# Patient Record
Sex: MALE | Race: WHITE | Employment: OTHER | URBAN - METROPOLITAN AREA
[De-identification: names, ages, dates, MRNs, and addresses within clinical notes are randomized per-mention and may not be internally consistent; named-entity substitution may affect disease eponyms.]

---

## 2017-02-17 ENCOUNTER — ANESTHESIA (OUTPATIENT)
Dept: SURGERY | Facility: CLINIC | Age: 57
End: 2017-02-17
Payer: COMMERCIAL

## 2017-02-17 ENCOUNTER — SURGERY (OUTPATIENT)
Age: 57
End: 2017-02-17

## 2017-02-17 ENCOUNTER — ANESTHESIA EVENT (OUTPATIENT)
Dept: SURGERY | Facility: CLINIC | Age: 57
End: 2017-02-17
Payer: COMMERCIAL

## 2017-02-17 PROCEDURE — 25000125 ZZHC RX 250: Performed by: NURSE ANESTHETIST, CERTIFIED REGISTERED

## 2017-02-17 PROCEDURE — 25000128 H RX IP 250 OP 636: Performed by: NURSE ANESTHETIST, CERTIFIED REGISTERED

## 2017-02-17 PROCEDURE — 25800025 ZZH RX 258: Performed by: ANESTHESIOLOGY

## 2017-02-17 RX ORDER — ONDANSETRON 2 MG/ML
INJECTION INTRAMUSCULAR; INTRAVENOUS PRN
Status: DISCONTINUED | OUTPATIENT
Start: 2017-02-17 | End: 2017-02-17

## 2017-02-17 RX ORDER — EPHEDRINE SULFATE 50 MG/ML
INJECTION, SOLUTION INTRAMUSCULAR; INTRAVENOUS; SUBCUTANEOUS PRN
Status: DISCONTINUED | OUTPATIENT
Start: 2017-02-17 | End: 2017-02-17

## 2017-02-17 RX ORDER — FENTANYL CITRATE 50 UG/ML
INJECTION, SOLUTION INTRAMUSCULAR; INTRAVENOUS PRN
Status: DISCONTINUED | OUTPATIENT
Start: 2017-02-17 | End: 2017-02-17

## 2017-02-17 RX ORDER — GLYCOPYRROLATE 0.2 MG/ML
INJECTION, SOLUTION INTRAMUSCULAR; INTRAVENOUS PRN
Status: DISCONTINUED | OUTPATIENT
Start: 2017-02-17 | End: 2017-02-17

## 2017-02-17 RX ORDER — LIDOCAINE HYDROCHLORIDE 20 MG/ML
INJECTION, SOLUTION INFILTRATION; PERINEURAL PRN
Status: DISCONTINUED | OUTPATIENT
Start: 2017-02-17 | End: 2017-02-17

## 2017-02-17 RX ORDER — PROPOFOL 10 MG/ML
INJECTION, EMULSION INTRAVENOUS PRN
Status: DISCONTINUED | OUTPATIENT
Start: 2017-02-17 | End: 2017-02-17

## 2017-02-17 RX ORDER — NEOSTIGMINE METHYLSULFATE 1 MG/ML
VIAL (ML) INJECTION PRN
Status: DISCONTINUED | OUTPATIENT
Start: 2017-02-17 | End: 2017-02-17

## 2017-02-17 RX ADMIN — PHENYLEPHRINE HYDROCHLORIDE 50 MCG: 10 INJECTION, SOLUTION INTRAMUSCULAR; INTRAVENOUS; SUBCUTANEOUS at 14:24

## 2017-02-17 RX ADMIN — ROCURONIUM BROMIDE 30 MG: 10 INJECTION INTRAVENOUS at 13:20

## 2017-02-17 RX ADMIN — ONDANSETRON 4 MG: 2 INJECTION INTRAMUSCULAR; INTRAVENOUS at 14:25

## 2017-02-17 RX ADMIN — GLYCOPYRROLATE 0.6 MG: 0.2 INJECTION, SOLUTION INTRAMUSCULAR; INTRAVENOUS at 14:33

## 2017-02-17 RX ADMIN — PHENYLEPHRINE HYDROCHLORIDE 50 MCG: 10 INJECTION, SOLUTION INTRAMUSCULAR; INTRAVENOUS; SUBCUTANEOUS at 13:54

## 2017-02-17 RX ADMIN — PHENYLEPHRINE HYDROCHLORIDE 50 MCG: 10 INJECTION, SOLUTION INTRAMUSCULAR; INTRAVENOUS; SUBCUTANEOUS at 13:57

## 2017-02-17 RX ADMIN — METOPROLOL TARTRATE 2 MG: 5 INJECTION INTRAVENOUS at 14:38

## 2017-02-17 RX ADMIN — PHENYLEPHRINE HYDROCHLORIDE 100 MCG: 10 INJECTION, SOLUTION INTRAMUSCULAR; INTRAVENOUS; SUBCUTANEOUS at 13:22

## 2017-02-17 RX ADMIN — Medication 5 MG: at 13:42

## 2017-02-17 RX ADMIN — PHENYLEPHRINE HYDROCHLORIDE 50 MCG: 10 INJECTION, SOLUTION INTRAMUSCULAR; INTRAVENOUS; SUBCUTANEOUS at 14:00

## 2017-02-17 RX ADMIN — SODIUM CHLORIDE, POTASSIUM CHLORIDE, SODIUM LACTATE AND CALCIUM CHLORIDE: 600; 310; 30; 20 INJECTION, SOLUTION INTRAVENOUS at 13:59

## 2017-02-17 RX ADMIN — MIDAZOLAM HYDROCHLORIDE 1 MG: 1 INJECTION, SOLUTION INTRAMUSCULAR; INTRAVENOUS at 13:15

## 2017-02-17 RX ADMIN — Medication 5 MG: at 13:45

## 2017-02-17 RX ADMIN — PHENYLEPHRINE HYDROCHLORIDE 50 MCG: 10 INJECTION, SOLUTION INTRAMUSCULAR; INTRAVENOUS; SUBCUTANEOUS at 13:37

## 2017-02-17 RX ADMIN — PHENYLEPHRINE HYDROCHLORIDE 50 MCG: 10 INJECTION, SOLUTION INTRAMUSCULAR; INTRAVENOUS; SUBCUTANEOUS at 14:19

## 2017-02-17 RX ADMIN — PHENYLEPHRINE HYDROCHLORIDE 50 MCG: 10 INJECTION, SOLUTION INTRAMUSCULAR; INTRAVENOUS; SUBCUTANEOUS at 13:51

## 2017-02-17 RX ADMIN — PHENYLEPHRINE HYDROCHLORIDE 50 MCG: 10 INJECTION, SOLUTION INTRAMUSCULAR; INTRAVENOUS; SUBCUTANEOUS at 14:06

## 2017-02-17 RX ADMIN — PHENYLEPHRINE HYDROCHLORIDE 50 MCG: 10 INJECTION, SOLUTION INTRAMUSCULAR; INTRAVENOUS; SUBCUTANEOUS at 14:03

## 2017-02-17 RX ADMIN — Medication 5 MG: at 14:03

## 2017-02-17 RX ADMIN — MIDAZOLAM HYDROCHLORIDE 1 MG: 1 INJECTION, SOLUTION INTRAMUSCULAR; INTRAVENOUS at 13:17

## 2017-02-17 RX ADMIN — Medication 10 MG: at 13:29

## 2017-02-17 RX ADMIN — Medication 5 MG: at 13:51

## 2017-02-17 RX ADMIN — FENTANYL CITRATE 50 MCG: 50 INJECTION, SOLUTION INTRAMUSCULAR; INTRAVENOUS at 13:22

## 2017-02-17 RX ADMIN — FENTANYL CITRATE 50 MCG: 50 INJECTION, SOLUTION INTRAMUSCULAR; INTRAVENOUS at 13:20

## 2017-02-17 RX ADMIN — PROPOFOL 150 MG: 10 INJECTION, EMULSION INTRAVENOUS at 13:20

## 2017-02-17 RX ADMIN — PHENYLEPHRINE HYDROCHLORIDE 100 MCG: 10 INJECTION, SOLUTION INTRAMUSCULAR; INTRAVENOUS; SUBCUTANEOUS at 13:27

## 2017-02-17 RX ADMIN — Medication 5 MG: at 14:12

## 2017-02-17 RX ADMIN — PHENYLEPHRINE HYDROCHLORIDE 50 MCG: 10 INJECTION, SOLUTION INTRAMUSCULAR; INTRAVENOUS; SUBCUTANEOUS at 14:28

## 2017-02-17 RX ADMIN — PHENYLEPHRINE HYDROCHLORIDE 50 MCG: 10 INJECTION, SOLUTION INTRAMUSCULAR; INTRAVENOUS; SUBCUTANEOUS at 13:33

## 2017-02-17 RX ADMIN — PHENYLEPHRINE HYDROCHLORIDE 100 MCG: 10 INJECTION, SOLUTION INTRAMUSCULAR; INTRAVENOUS; SUBCUTANEOUS at 13:42

## 2017-02-17 RX ADMIN — PHENYLEPHRINE HYDROCHLORIDE 50 MCG: 10 INJECTION, SOLUTION INTRAMUSCULAR; INTRAVENOUS; SUBCUTANEOUS at 13:49

## 2017-02-17 RX ADMIN — Medication 5 MG: at 13:49

## 2017-02-17 RX ADMIN — NEOSTIGMINE METHYLSULFATE 4 MG: 1 INJECTION INTRAMUSCULAR; INTRAVENOUS; SUBCUTANEOUS at 14:33

## 2017-02-17 RX ADMIN — Medication 5 MG: at 13:33

## 2017-02-17 RX ADMIN — LIDOCAINE HYDROCHLORIDE 100 MG: 20 INJECTION, SOLUTION INFILTRATION; PERINEURAL at 13:20

## 2017-02-17 RX ADMIN — PHENYLEPHRINE HYDROCHLORIDE 50 MCG: 10 INJECTION, SOLUTION INTRAMUSCULAR; INTRAVENOUS; SUBCUTANEOUS at 14:09

## 2017-02-17 RX ADMIN — PHENYLEPHRINE HYDROCHLORIDE 50 MCG: 10 INJECTION, SOLUTION INTRAMUSCULAR; INTRAVENOUS; SUBCUTANEOUS at 14:12

## 2017-02-17 RX ADMIN — Medication 5 MG: at 13:37

## 2017-02-17 ASSESSMENT — ENCOUNTER SYMPTOMS
ORTHOPNEA: 0
SEIZURES: 0

## 2017-02-17 ASSESSMENT — LIFESTYLE VARIABLES: TOBACCO_USE: 0

## 2017-02-17 NOTE — ANESTHESIA CARE TRANSFER NOTE
Patient: Chris Barriga    Procedure(s):  INTRAOPERATIVE COLONOSCOPY, POLYPECTOMY - Wound Class: II-Clean Contaminated    Diagnosis: RIGHT COLON POLYP  Diagnosis Additional Information: No value filed.    Anesthesia Type:   General, ETT     Note:  Airway :Face Mask  Patient transferred to:PACU        Vitals: (Last set prior to Anesthesia Care Transfer)    CRNA VITALS  2/17/2017 1411 - 2/17/2017 1445      2/17/2017             Pulse: 77    SpO2: 100 %    Resp Rate (set): 10                Electronically Signed By: BETH Restrepo CRNA  February 17, 2017  2:45 PM

## 2017-02-17 NOTE — ANESTHESIA POSTPROCEDURE EVALUATION
Patient: Chris Barriga    Procedure(s):  INTRAOPERATIVE COLONOSCOPY, POLYPECTOMY - Wound Class: II-Clean Contaminated    Diagnosis:RIGHT COLON POLYP  Diagnosis Additional Information: No value filed.    Anesthesia Type:  General, ETT    Note:  Anesthesia Post Evaluation    Patient location during evaluation: PACU  Patient participation: Able to fully participate in evaluation  Level of consciousness: awake  Pain management: adequate  Airway patency: patent  Cardiovascular status: acceptable  Respiratory status: acceptable  Hydration status: acceptable  PONV: none     Anesthetic complications: None          Last vitals:  Vitals:    02/17/17 1450 02/17/17 1500 02/17/17 1526   BP: 115/69 132/83 145/86   Resp: 15  16   Temp:   36.7  C (98  F)   SpO2: 100%  98%         Electronically Signed By: Carter Dias MD  February 17, 2017  5:32 PM

## 2017-02-17 NOTE — ANESTHESIA PREPROCEDURE EVALUATION
"Procedure: Procedure(s):  COLONOSCOPY  LAPAROSCOPIC ASSISTED COLECTOMY  COLECTOMY RIGHT  Preop diagnosis: RIGHT COLON POLYP  Allergies   Allergen Reactions     Seasonal Allergies      Vicodin [Hydrocodone-Acetaminophen] Other (See Comments)     FEELS WIRED     Patient Active Problem List   Diagnosis     Pain in joint, lower leg     Other postprocedural status(V45.89)     Iliac artery aneurysm, left (H)     CAD (coronary artery disease)     Hypertension     Past Medical History   Diagnosis Date     Adenoma of ascending colon      Chest pain      Coronary artery disease      HTN (hypertension)      Hyperlipidaemia      Stented coronary artery      Past Surgical History   Procedure Laterality Date     Appendectomy open       Arthroscopy knee bilateral       Arthroscopy shoulder       left     C oral surgery procedure       Vasectomy       Cardiac surgery       stent x 1     Laparoscopic herniorrhaphy inguinal  12/30/2013     Procedure: LAPAROSCOPIC HERNIORRHAPHY INGUINAL;  LAPAROSCOPIC RIGHT INGUINAL HERNIA REPAIR WITH MESH;  Surgeon: Reji Pizano MD;  Location: [facility]     Abdomen surgery  appy and hernia       No current facility-administered medications on file prior to encounter.   Current Outpatient Prescriptions on File Prior to Encounter:  fish oil-omega-3 fatty acids (FISH OIL) 1000 MG capsule Take 1 g by mouth daily    glucosamine-chondroitin 500-400 MG CAPS Take 2 capsules by mouth daily    Lysine 500 MG TABS Take 1 tablet by mouth daily.   MINOCYCLINE HCL PO Take 100 mg by mouth daily as needed (acne)    Multiple Vitamin (MULTI-VITAMIN) per tablet Take 1 tablet by mouth daily.   nitroglycerin (NITROSTAT) 0.4 MG SL tablet Place 1 tablet under the tongue every 5 minutes as needed for chest pain for 3 doses. Administer every 5 minutes as needed.  Maximum 3 doses in 15 minutes.     /88  Temp 36.3  C (97.3  F) (Temporal)  Resp 16  Ht 1.803 m (5' 11\")  Wt 79.4 kg (175 lb)  SpO2 99%  BMI 24.41 " kg/m2    Lab Results   Component Value Date    WBC 4.8 05/25/2012     Lab Results   Component Value Date    RBC 4.97 05/25/2012     Lab Results   Component Value Date    HGB 16.9 02/17/2017     Lab Results   Component Value Date    HCT 44.2 05/25/2012     Lab Results   Component Value Date    MCV 89 05/25/2012     Lab Results   Component Value Date    MCH 32.6 05/25/2012     Lab Results   Component Value Date    MCHC 36.7 05/25/2012     Lab Results   Component Value Date    RDW 13.0 05/25/2012     Lab Results   Component Value Date     05/25/2012     Lab Results   Component Value Date    INR 0.89 05/25/2012       Last Basic Metabolic Panel:  Lab Results   Component Value Date     06/21/2012      Lab Results   Component Value Date    POTASSIUM 3.4 02/17/2017     Lab Results   Component Value Date    CHLORIDE 104 06/21/2012     Lab Results   Component Value Date    DANY 9.3 06/21/2012     Lab Results   Component Value Date    CO2 28 06/21/2012     Lab Results   Component Value Date    BUN 15 06/21/2012     Lab Results   Component Value Date    CR 1.04 02/17/2017     Lab Results   Component Value Date    GLC 95 06/21/2012       HGB 15.2  K 4.1  EKG - SR, nl axis    Anesthesia Evaluation     . Pt has had prior anesthetic.     No history of anesthetic complications     ROS/MED HX    ENT/Pulmonary:  - neg pulmonary ROS    (-) tobacco use, sleep apnea and recent URI   Neurologic:  - neg neurologic ROS    (-) seizures, CVA and migraines   Cardiovascular:     (+) Dyslipidemia, hypertension--CAD, --stent (Stent to LAD),may 2012  . : . . . :. .      (-) angina, past MI, CHF, orthopnea/PND, angina and past MI   METS/Exercise Tolerance:     Hematologic:  - neg hematologic  ROS       Musculoskeletal:         GI/Hepatic:     (+) Other GI/Hepatic colon polyp     (-) GERD   Renal/Genitourinary:  - ROS Renal section negative       Endo:  - neg endo ROS    (-) Type II DM and thyroid disease   Psychiatric:  - neg  psychiatric ROS       Infectious Disease:  - neg infectious disease ROS       Malignancy:   (+) Malignancy History of Skin          Other:               Physical Exam  Normal systems: cardiovascular, pulmonary and dental    Airway   Mallampati: I  TM distance: >3 FB  Neck ROM: full    Dental     Cardiovascular   Rhythm and rate: regular and normal      Pulmonary    breath sounds clear to auscultation                    Anesthesia Plan      History & Physical Review  History and physical reviewed and following examination; no interval change.    ASA Status:  3 .    NPO Status:  > 8 hours    Plan for General and ETT with Propofol induction. Maintenance will be Balanced.    PONV prophylaxis:  Ondansetron (or other 5HT-3)       Postoperative Care      Consents  Anesthetic plan, risks, benefits and alternatives discussed with:  Patient..                          .

## 2017-05-22 ENCOUNTER — HOSPITAL ENCOUNTER (OUTPATIENT)
Facility: CLINIC | Age: 57
Discharge: HOME | End: 2017-05-22
Attending: COLON & RECTAL SURGERY | Admitting: COLON & RECTAL SURGERY
Payer: COMMERCIAL

## 2017-05-22 VITALS
BODY MASS INDEX: 23.8 KG/M2 | HEIGHT: 71 IN | RESPIRATION RATE: 19 BRPM | SYSTOLIC BLOOD PRESSURE: 121 MMHG | WEIGHT: 170 LBS | OXYGEN SATURATION: 100 % | DIASTOLIC BLOOD PRESSURE: 92 MMHG

## 2017-05-22 LAB — COLONOSCOPY: NORMAL

## 2017-05-22 PROCEDURE — G0500 MOD SEDAT ENDO SERVICE >5YRS: HCPCS | Performed by: COLON & RECTAL SURGERY

## 2017-05-22 PROCEDURE — 88305 TISSUE EXAM BY PATHOLOGIST: CPT | Mod: 26 | Performed by: COLON & RECTAL SURGERY

## 2017-05-22 PROCEDURE — 45385 COLONOSCOPY W/LESION REMOVAL: CPT | Mod: PT | Performed by: COLON & RECTAL SURGERY

## 2017-05-22 PROCEDURE — 88305 TISSUE EXAM BY PATHOLOGIST: CPT | Performed by: COLON & RECTAL SURGERY

## 2017-05-22 PROCEDURE — 25000128 H RX IP 250 OP 636: Performed by: COLON & RECTAL SURGERY

## 2017-05-22 PROCEDURE — 25000125 ZZHC RX 250: Performed by: COLON & RECTAL SURGERY

## 2017-05-22 PROCEDURE — 99153 MOD SED SAME PHYS/QHP EA: CPT | Performed by: COLON & RECTAL SURGERY

## 2017-05-22 RX ORDER — ONDANSETRON 4 MG/1
4 TABLET, ORALLY DISINTEGRATING ORAL EVERY 6 HOURS PRN
Status: DISCONTINUED | OUTPATIENT
Start: 2017-05-22 | End: 2017-05-22 | Stop reason: HOSPADM

## 2017-05-22 RX ORDER — ONDANSETRON 2 MG/ML
4 INJECTION INTRAMUSCULAR; INTRAVENOUS
Status: DISCONTINUED | OUTPATIENT
Start: 2017-05-22 | End: 2017-05-22 | Stop reason: HOSPADM

## 2017-05-22 RX ORDER — FLUMAZENIL 0.1 MG/ML
0.2 INJECTION, SOLUTION INTRAVENOUS
Status: DISCONTINUED | OUTPATIENT
Start: 2017-05-22 | End: 2017-05-22 | Stop reason: HOSPADM

## 2017-05-22 RX ORDER — NALOXONE HYDROCHLORIDE 0.4 MG/ML
.1-.4 INJECTION, SOLUTION INTRAMUSCULAR; INTRAVENOUS; SUBCUTANEOUS
Status: DISCONTINUED | OUTPATIENT
Start: 2017-05-22 | End: 2017-05-22 | Stop reason: HOSPADM

## 2017-05-22 RX ORDER — LIDOCAINE 40 MG/G
CREAM TOPICAL
Status: DISCONTINUED | OUTPATIENT
Start: 2017-05-22 | End: 2017-05-22 | Stop reason: HOSPADM

## 2017-05-22 RX ORDER — ONDANSETRON 2 MG/ML
4 INJECTION INTRAMUSCULAR; INTRAVENOUS EVERY 6 HOURS PRN
Status: DISCONTINUED | OUTPATIENT
Start: 2017-05-22 | End: 2017-05-22 | Stop reason: HOSPADM

## 2017-05-22 RX ORDER — FENTANYL CITRATE 50 UG/ML
INJECTION, SOLUTION INTRAMUSCULAR; INTRAVENOUS PRN
Status: DISCONTINUED | OUTPATIENT
Start: 2017-05-22 | End: 2017-05-22 | Stop reason: HOSPADM

## 2017-05-22 NOTE — OP NOTE
See Provation Note In Chart    Junie Grier MD  [facility]  Office Phone # [phone]

## 2017-05-22 NOTE — DISCHARGE INSTRUCTIONS
Understanding Colon and Rectal Polyps     The colon has a smooth lining composed of millions of cells.     The colon (also called the large intestine) is a muscular tube that forms the last part of the digestive tract. It absorbs water and stores food waste. The colon is about 4 to 6 feet long. The rectum is the last 6 inches of the colon. The colon and rectum have a smooth lining composed of millions of cells. Changes in these cells can lead to growths in the colon that can become cancerous and should be removed.     When the Colon Lining Changes  Changes that occur in the cells that line the colon or rectum can lead to growths called polyps. Over a period of years, polyps can turn cancerous. Removing polyps early may prevent cancer from ever forming.      Polyps  Polyps are fleshy clumps of tissue that form on the lining of the colon or rectum. Small polyps are usually benign (not cancerous). However, over time, cells in a polyp can change and become cancerous. The larger a polyp grows, the more likely this is to happen. Also, certain types of polyps known as adenomatous polyps are considered premalignant. This means that they will almost always become cancerous if they re not removed.          Cancer  Almost all colorectal cancers start when polyp cells begin growing abnormally. As a cancerous tumor grows, it may involve more and more of the colon or rectum. In time, cancer can also grow beyond the colon or rectum and spread to nearby organs or to glands called lymph nodes. The cells can also travel to other parts of the body. This is known as metastasis. The earlier a cancerous tumor is removed, the better the chance of preventing its spread.        5383-3601 [facility] [place], [address] [place] [address], [place] [address]. All rights reserved. This information is not intended as a substitute for professional medical care. Always follow your healthcare professional's instructions.

## 2017-05-22 NOTE — IP AVS SNAPSHOT
[facility]    [address] [facility] [address]    Phone:  [phone]    Fax:  [phone]                                       After Visit Summary   5/22/2017    Chris Barriga    MRN: [id number]           After Visit Summary Signature Page     I have received my discharge instructions, and my questions have been answered. I have discussed any challenges I see with this plan with the nurse or doctor.    ..........................................................................................................................................  Patient/Patient Representative Signature      ..........................................................................................................................................  Patient Representative Print Name and Relationship to Patient    ..................................................               ................................................  Date                                            Time    ..........................................................................................................................................  Reviewed by Signature/Title    ...................................................              ..............................................  Date                                                            Time

## 2017-05-22 NOTE — IP AVS SNAPSHOT
MRN:[id number]                      After Visit Summary   5/22/2017    Chris Barriga    MRN: [id number]           Thank you!     Thank you for choosing [facility] for your care. Our goal is always to provide you with excellent care. Hearing back from our patients is one way we can continue to improve our services. Please take a few minutes to complete the written survey that you may receive in the mail after you visit. If you would like to speak to someone directly about your visit please contact Patient Relations at [phone]. Thank you!          Patient Information     Date Of Birth          1960        About your hospital stay     You were admitted on:  May 22, 2017 You last received care in the:  [facility]    You were discharged on:  May 22, 2017       Who to Call     For medical emergencies, please call 911.  For non-urgent questions about your medical care, please call your primary care provider or clinic, [phone]  For questions related to your surgery, please call your surgery clinic        Attending Provider     Provider Specialty    Junie Grier MD [facility]       Primary Care Provider Office Phone # Fax #    Bharathi Chowdary [phone]       [facility] [address] [facility] [address]        Further instructions from your care team         Understanding Colon and Rectal Polyps     The colon has a smooth lining composed of millions of cells.     The colon (also called the large intestine) is a muscular tube that forms the last part of the digestive tract. It absorbs water and stores food waste. The colon is about 4 to 6 feet long. The rectum is the last 6 inches of the colon. The colon and rectum have a smooth lining composed of millions of cells. Changes in these cells can lead to growths in the colon that can become cancerous and should be removed.     When the Colon Lining Changes  Changes that occur  "in the cells that line the colon or rectum can lead to growths called polyps. Over a period of years, polyps can turn cancerous. Removing polyps early may prevent cancer from ever forming.      Polyps  Polyps are fleshy clumps of tissue that form on the lining of the colon or rectum. Small polyps are usually benign (not cancerous). However, over time, cells in a polyp can change and become cancerous. The larger a polyp grows, the more likely this is to happen. Also, certain types of polyps known as adenomatous polyps are considered premalignant. This means that they will almost always become cancerous if they re not removed.          Cancer  Almost all colorectal cancers start when polyp cells begin growing abnormally. As a cancerous tumor grows, it may involve more and more of the colon or rectum. In time, cancer can also grow beyond the colon or rectum and spread to nearby organs or to glands called lymph nodes. The cells can also travel to other parts of the body. This is known as metastasis. The earlier a cancerous tumor is removed, the better the chance of preventing its spread.        [address] [place] [address]. All rights reserved. This information is not intended as a substitute for professional medical care. Always follow your healthcare professional's instructions.        Pending Results     No orders found from 5/20/2017 to 5/23/2017.            Admission Information     Date & Time Provider Department Dept. Phone    5/22/2017 Junie Grier MD [facility] [phone]      Your Vitals Were     Blood Pressure Respirations Height Weight Pulse Oximetry BMI (Body Mass Index)    125/80 19 1.803 m (5' 11\") 77.1 kg (170 lb) 100% 23.71 kg/m2      [employer] Information     [employer] lets you send messages to your doctor, view your test results, renew your prescriptions, schedule appointments and more. To sign up, go to [email or web address] . Click on " "\"Log in\" on the left side of the screen, which will take you to the Welcome page. Then click on \"Sign up Now\" on the right side of the page.     You will be asked to enter the access code listed below, as well as some personal information. Please follow the directions to create your username and password.     Your access code is: [email or web address]  Expires: 2017 11:22 AM     Your access code will  in 90 days. If you need help or a new code, please call your [place] clinic or [phone].        Care EveryWhere ID     This is your Care EveryWhere ID. This could be used by other organizations to access your [place] medical records  [id number]           Review of your medicines      UNREVIEWED medicines. Ask your doctor about these medicines        Dose / Directions    ASPIRIN EC PO        Dose:  81 mg   Take 81 mg by mouth daily   Refills:  0       fish oil-omega-3 fatty acids 1000 MG capsule        Dose:  1 g   Take 1 g by mouth daily   Refills:  0       flax seed oil 1000 MG capsule        Dose:  1 capsule   Take 1 capsule by mouth daily   Refills:  0       glucosamine-chondroitin 500-400 MG Caps per capsule        Dose:  2 capsule   Take 2 capsules by mouth daily   Refills:  0       hydrocortisone 0.2 % cream   Commonly known as:  WESTCORT        Apply topically 2 times daily as needed   Refills:  0       losartan-hydrochlorothiazide 100-25 MG per tablet   Commonly known as:  HYZAAR        Dose:  1 tablet   Take 1 tablet by mouth daily   Refills:  0       Lysine 500 MG Tabs        Dose:  1 tablet   Take 1 tablet by mouth daily.   Refills:  0       magnesium 500 MG Tabs        Dose:  500 mg   Take 500 mg by mouth every evening   Refills:  0       MINOCYCLINE HCL PO        Dose:  100 mg   Take 100 mg by mouth daily as needed (acne)   Refills:  0       Multi-vitamin Tabs tablet   Generic drug:  multivitamin, therapeutic with minerals        Dose:  1 tablet   Take 1 tablet by mouth daily.   Refills:  0       " nitroglycerin 0.4 MG sublingual tablet   Commonly known as:  NITROSTAT   Used for:  CAD (coronary artery disease), Status post coronary angioplasty        Dose:  0.4 mg   Place 1 tablet under the tongue every 5 minutes as needed for chest pain for 3 doses. Administer every 5 minutes as needed.  Maximum 3 doses in 15 minutes.   Quantity:  75 tablet   Refills:  3       Potassium Gluconate 595 (99 K) MG Tabs        Dose:  1 tablet   Take 1 tablet by mouth daily   Refills:  0       ROSUVASTATIN CALCIUM PO        Dose:  10 mg   Take 10 mg by mouth At Bedtime   Refills:  0       ZICAM COLD REMEDY NA        Dose:  1 spray   Spray 1 spray in nostril daily as needed   Refills:  0                Protect others around you: Learn how to safely use, store and throw away your medicines at www.disposemymeds.org.             Medication List: This is a list of all your medications and when to take them. Check marks below indicate your daily home schedule. Keep this list as a reference.      Medications           Morning Afternoon Evening Bedtime As Needed    ASPIRIN EC PO   Take 81 mg by mouth daily                                fish oil-omega-3 fatty acids 1000 MG capsule   Take 1 g by mouth daily                                flax seed oil 1000 MG capsule   Take 1 capsule by mouth daily                                glucosamine-chondroitin 500-400 MG Caps per capsule   Take 2 capsules by mouth daily                                hydrocortisone 0.2 % cream   Commonly known as:  WESTCORT   Apply topically 2 times daily as needed                                losartan-hydrochlorothiazide 100-25 MG per tablet   Commonly known as:  HYZAAR   Take 1 tablet by mouth daily                                Lysine 500 MG Tabs   Take 1 tablet by mouth daily.                                magnesium 500 MG Tabs   Take 500 mg by mouth every evening                                MINOCYCLINE HCL PO   Take 100 mg by mouth daily as needed  (acne)                                Multi-vitamin Tabs tablet   Take 1 tablet by mouth daily.   Generic drug:  multivitamin, therapeutic with minerals                                nitroglycerin 0.4 MG sublingual tablet   Commonly known as:  NITROSTAT   Place 1 tablet under the tongue every 5 minutes as needed for chest pain for 3 doses. Administer every 5 minutes as needed.  Maximum 3 doses in 15 minutes.                                Potassium Gluconate 595 (99 K) MG Tabs   Take 1 tablet by mouth daily                                ROSUVASTATIN CALCIUM PO   Take 10 mg by mouth At Bedtime                                ZICAM COLD REMEDY NA   Spray 1 spray in nostril daily as needed

## 2017-05-22 NOTE — H&P
Pre-Endoscopy History and Physical     Chris Barriga MRN# [id number]   YOB: 1960 Age: 56 year old     Date of Procedure: 5/22/2017  Primary care provider: Bharathi Chowdary  Type of Endoscopy: colonoscopy  Reason for Procedure: surveillance  Type of Anesthesia Anticipated: Moderate Sedation    HPI:    Chris is a 56 year old male who will be undergoing the above procedure.      A history and physical has been performed. The patient's medications and allergies have been reviewed. The risks and benefits of the procedure and the sedation options and risks were discussed with the patient.  All questions were answered and informed consent was obtained.      He denies a personal or family history of anesthesia complications or bleeding disorders.     Allergies   Allergen Reactions     Seasonal Allergies      Vicodin [Hydrocodone-Acetaminophen] Other (See Comments)     FEELS WIRED        Prior to Admission Medications   Prescriptions Last Dose Informant Patient Reported? Taking?   ASPIRIN EC PO Past Week Self Yes Yes   Sig: Take 81 mg by mouth daily   Flaxseed, Linseed, (FLAX SEED OIL) 1000 MG capsule Past Month Self Yes Yes   Sig: Take 1 capsule by mouth daily   Homeopathic Products (ZICAM COLD REMEDY NA) Past Month Self Yes Yes   Sig: Spray 1 spray in nostril daily as needed   Lysine 500 MG TABS Past Month Self Yes Yes   Sig: Take 1 tablet by mouth daily.   MINOCYCLINE HCL PO Past Month Self Yes Yes   Sig: Take 100 mg by mouth daily as needed (acne)    Multiple Vitamin (MULTI-VITAMIN) per tablet Past Month Self Yes Yes   Sig: Take 1 tablet by mouth daily.   Potassium Gluconate 595 (99 K) MG TABS Past Month Self Yes Yes   Sig: Take 1 tablet by mouth daily   ROSUVASTATIN CALCIUM PO Past Week Self Yes Yes   Sig: Take 10 mg by mouth At Bedtime   fish oil-omega-3 fatty acids (FISH OIL) 1000 MG capsule Past Month Self Yes Yes   Sig: Take 1 g by mouth daily    glucosamine-chondroitin 500-400 MG CAPS Past Month Self  Yes Yes   Sig: Take 2 capsules by mouth daily    hydrocortisone (WESTCORT) 0.2 % cream Past Month Self Yes Yes   Sig: Apply topically 2 times daily as needed    losartan-hydrochlorothiazide (HYZAAR) 100-25 MG per tablet 5/21/2017 Self Yes Yes   Sig: Take 1 tablet by mouth daily   magnesium 500 MG TABS Past Month Self Yes Yes   Sig: Take 500 mg by mouth every evening   nitroglycerin (NITROSTAT) 0.4 MG SL tablet not used yet Self No No   Sig: Place 1 tablet under the tongue every 5 minutes as needed for chest pain for 3 doses. Administer every 5 minutes as needed.  Maximum 3 doses in 15 minutes.      Facility-Administered Medications: None       Patient Active Problem List   Diagnosis     Pain in joint, lower leg     Other postprocedural status(V45.89)     Iliac artery aneurysm, left (H)     CAD (coronary artery disease)     Hypertension        Past Medical History:   Diagnosis Date     Adenoma of ascending colon      Chest pain      Coronary artery disease      HTN (hypertension)      Hyperlipidaemia      Stented coronary artery         Past Surgical History:   Procedure Laterality Date     APPENDECTOMY OPEN       ARTHROSCOPY KNEE BILATERAL      both knees     ARTHROSCOPY SHOULDER      left     C ORAL SURGERY PROCEDURE       CARDIAC SURGERY      stent x 1     COLONOSCOPY N/A 2/17/2017    Procedure: COLONOSCOPY;  Surgeon: Junie Grier MD;  Location:  OR     COLONOSCOPY  12/2016    [facility] in [place]     COLONOSCOPY  05/22/2017    Dr. Grier [facility]     LAPAROSCOPIC HERNIORRHAPHY INGUINAL  12/30/2013    Procedure: LAPAROSCOPIC HERNIORRHAPHY INGUINAL;  LAPAROSCOPIC RIGHT INGUINAL HERNIA REPAIR WITH MESH;  Surgeon: Reji Pziano MD;  [facility]     ORTHOPEDIC SURGERY      scope of both shoulders     VASECTOMY         Social History   Substance Use Topics     Smoking status: Never Smoker     Smokeless tobacco: Never Used     Alcohol use Yes      Comment: 2-3 per week       Family History   Problem  "Relation Age of Onset     Breast Cancer Mother      CEREBROVASCULAR DISEASE Father      Breast Cancer Maternal Grandmother      Breast Cancer Son        REVIEW OF SYSTEMS:     5 point ROS negative except as noted above in HPI, including Gen., Resp., CV, GI &  system review.      PHYSICAL EXAM:   Ht 1.803 m (5' 11\")  Wt 77.1 kg (170 lb)  BMI 23.71 kg/m2 Estimated body mass index is 23.71 kg/(m^2) as calculated from the following:    Height as of this encounter: 1.803 m (5' 11\").    Weight as of this encounter: 77.1 kg (170 lb).   GENERAL APPEARANCE: healthy and alert  MENTAL STATUS: alert  AIRWAY EXAM: Mallampatti Class I (visualization of the soft palate, fauces, uvula, anterior and posterior pillars)  RESP: lungs clear to auscultation - no rales, rhonchi or wheezes  CV: regular rates and rhythm      DIAGNOSTICS:    Not indicated      IMPRESSION   ASA Class 2 - Mild systemic disease        PLAN:       Plan for colonoscopy. We discussed the risks, benefits and alternatives and the patient wished to proceed.    The above has been forwarded to the consulting provider.      Signed Electronically by: Junie Grier MD  May 22, 2017    "

## 2017-05-23 LAB — COPATH REPORT: NORMAL

## 2017-08-21 ENCOUNTER — TRANSFERRED RECORDS (OUTPATIENT)
Dept: HEALTH INFORMATION MANAGEMENT | Facility: CLINIC | Age: 57
End: 2017-08-21

## 2017-08-21 LAB
ALBUMIN SERPL-MCNC: 4.3 G/DL
ALP SERPL-CCNC: 84 U/L
ALT SERPL-CCNC: 25 U/L
ANION GAP SERPL CALCULATED.3IONS-SCNC: 7 MMOL/L
AST SERPL-CCNC: 23 U/L
BILIRUB SERPL-MCNC: 0.8 MG/DL
BUN SERPL-MCNC: 14 MG/DL
CALCIUM SERPL-MCNC: 9.1 MG/DL
CHLORIDE SERPLBLD-SCNC: 103 MMOL/L
CHOLEST SERPL-MCNC: 128 MG/DL
CO2 SERPL-SCNC: 28 MMOL/L
CREAT SERPL-MCNC: 0.83 MG/DL
ERYTHROCYTE [DISTWIDTH] IN BLOOD BY AUTOMATED COUNT: 13 %
GFR SERPL CREATININE-BSD FRML MDRD: >60 ML/MIN/1.73M2
GLUCOSE SERPL-MCNC: 97 MG/DL (ref 65–100)
HCT VFR BLD AUTO: 44.3 %
HDLC SERPL-MCNC: 74 MG/DL
HEMOGLOBIN: 15.4 G/DL (ref 13.5–17.5)
LDLC SERPL CALC-MCNC: 42 MG/DL
MCH RBC QN AUTO: 32 PG
MCHC RBC AUTO-ENTMCNC: 34.8 G/DL
MCV RBC AUTO: 92 FL
NONHDLC SERPL-MCNC: NORMAL MG/DL
PLATELET # BLD AUTO: 142 10^9/L
POTASSIUM SERPL-SCNC: 3.8 MMOL/L
PROT SERPL-MCNC: 6.4 G/DL
RBC # BLD AUTO: 4.81 10^12/L
SODIUM SERPL-SCNC: 138 MMOL/L
TRIGL SERPL-MCNC: 60 MG/DL
WBC # BLD AUTO: 5.5 10^9/L

## 2017-09-05 ENCOUNTER — PRE VISIT (OUTPATIENT)
Dept: CARDIOLOGY | Facility: CLINIC | Age: 57
End: 2017-09-05

## 2017-09-05 PROBLEM — E78.00 PURE HYPERCHOLESTEROLEMIA: Status: ACTIVE | Noted: 2017-09-05

## 2017-09-06 ENCOUNTER — OFFICE VISIT (OUTPATIENT)
Dept: CARDIOLOGY | Facility: CLINIC | Age: 57
End: 2017-09-06
Payer: COMMERCIAL

## 2017-09-06 VITALS
WEIGHT: 178.5 LBS | BODY MASS INDEX: 25.56 KG/M2 | SYSTOLIC BLOOD PRESSURE: 138 MMHG | DIASTOLIC BLOOD PRESSURE: 84 MMHG | HEIGHT: 70 IN

## 2017-09-06 DIAGNOSIS — I77.810 AORTIC ROOT DILATION (H): ICD-10-CM

## 2017-09-06 DIAGNOSIS — I25.10 CORONARY ARTERY DISEASE INVOLVING NATIVE CORONARY ARTERY OF NATIVE HEART WITHOUT ANGINA PECTORIS: Primary | ICD-10-CM

## 2017-09-06 DIAGNOSIS — E78.00 PURE HYPERCHOLESTEROLEMIA: ICD-10-CM

## 2017-09-06 DIAGNOSIS — I10 BENIGN ESSENTIAL HYPERTENSION: ICD-10-CM

## 2017-09-06 DIAGNOSIS — I72.3 ILIAC ARTERY ANEURYSM, LEFT (H): ICD-10-CM

## 2017-09-06 PROCEDURE — 99204 OFFICE O/P NEW MOD 45 MIN: CPT | Performed by: INTERNAL MEDICINE

## 2017-09-06 RX ORDER — ATORVASTATIN CALCIUM 10 MG/1
10 TABLET, FILM COATED ORAL DAILY
Qty: 30 TABLET | Refills: 11 | Status: SHIPPED | OUTPATIENT
Start: 2017-09-06 | End: 2017-10-06

## 2017-09-06 NOTE — LETTER
9/6/2017    Bharathi Chowdary MD  [employer]  [address] [place] [address]    RE: Chris Barriga       Dear Colleague,    I had the pleasure of seeing Chris Barriga in the [facility].    Mr. Barriga is a very nice 57-year-old gentleman with past medical history significant for stenting of his proximal left anterior descending artery due to unstable angina in 2012.  He also has hypercholesterolemia.  He now returns to re-establish with Cardiology as much because he is having problems with side effects with his statins.      Chris works out 5 days a week.  He does alternating bike riding versus walking, walking up to 6 miles at a time, and states he has no symptoms at all related to any of these activities.  He has no dyspnea on exertion, orthopnea or PND.  No chest, arm, neck, jaw or shoulder discomfort.  No lightheadedness, dizziness, syncope or near-syncope.      He does relate that he has had problems with side effects to the statins over the years.  He was initially started on simvastatin, which has left him feeling lousy.  He ultimately switched to rosuvastatin, then at first he did not connect, but was having lots of problems with disrupted sleep at nighttime, vivid dreams and would wake up almost every night.  He on his own started to look through his medications to see if any were causing side effects and at one time took a holiday from each one.  Several weeks ago, he stopped taking his rosuvastatin and states he had a dramatic decrease in his dreams at nighttime and was oftentimes sleeping through the night again.  He now comes to ask what alternatives he may have.  He tries to follow a very healthy lifestyle.  He eats a Mediterranean style diet, having fish twice a week.  He eats a lot of flaxseed, fruits and vegetables.  He eats very little red meat.  He maintains ideal body weight.  As stated, he exercises 5-6 times a week to a moderate to high intensity for upwards to  an hour or more.      Previous workup also demonstrated a mildly dilated aortic root at 3.8 cm and a mildly dilated left common femoral at 1.5.     Outpatient Encounter Prescriptions as of 9/6/2017   Medication Sig Dispense Refill     atorvastatin (LIPITOR) 10 MG tablet Take 1 tablet (10 mg) by mouth daily 30 tablet 11     hydrocortisone (WESTCORT) 0.2 % cream Apply topically 2 times daily as needed        magnesium 500 MG TABS Take 500 mg by mouth every evening       Potassium Gluconate 595 (99 K) MG TABS Take 1 tablet by mouth daily       Flaxseed, Linseed, (FLAX SEED OIL) 1000 MG capsule Take 1 capsule by mouth daily       losartan-hydrochlorothiazide (HYZAAR) 100-25 MG per tablet Take 1 tablet by mouth daily       ASPIRIN EC PO Take 81 mg by mouth daily       fish oil-omega-3 fatty acids (FISH OIL) 1000 MG capsule Take 1 g by mouth daily        glucosamine-chondroitin 500-400 MG CAPS Take 2 capsules by mouth daily        Lysine 500 MG TABS Take 1 tablet by mouth daily.       MINOCYCLINE HCL PO Take 100 mg by mouth daily as needed (acne)        Multiple Vitamin (MULTI-VITAMIN) per tablet Take 1 tablet by mouth daily.       nitroglycerin (NITROSTAT) 0.4 MG SL tablet Place 1 tablet under the tongue every 5 minutes as needed for chest pain for 3 doses. Administer every 5 minutes as needed.  Maximum 3 doses in 15 minutes. 75 tablet 3     [DISCONTINUED] Homeopathic Products (ZICAM COLD REMEDY NA) Spray 1 spray in nostril daily as needed       [DISCONTINUED] ROSUVASTATIN CALCIUM PO Take 10 mg by mouth At Bedtime       No facility-administered encounter medications on file as of 9/6/2017.       ASSESSMENT AND PLAN:   1.  Chris does not appear to have any symptoms to indicate ischemia, heart failure or significant arrhythmia.   2.  Fasting lipid profile checked in August was outstanding on 10 mg of rosuvastatin with total cholesterol of 128, HDL of 74, LDL of 42, triglycerides are 60.  We discussed the fact that  different statins can have different side effects.  I will try making a change to atorvastatin 10 mg.  I told him that this is now a change of drug, but also a change in drug equivalency as 10 mg of atorvastatin is half the equivalence of 10 mg of rosuvastatin.  If he does not tolerate the atorvastatin, then I would switch him to pravastatin starting at 20 mg daily.  I will recheck a fasting lipid profile in 1 month and have him follow up with my GERONIMO.  If he is tolerating atorvastatin and we have good numbers, we will just continue as is.  If he is not tolerating atorvastatin, we will switch to pravastatin.  If he is tolerating atorvastatin, but yet has unacceptable numbers, then we can try bumping the dose up.      I congratulated him on his healthy lifestyle and encouraged him to continue to do so.  We talked about the fact that 80% of all cardiovascular disease comes down to not smoking, exercising regularly, eating a healthy diet, and maintaining ideal body weight, which he appears to be doing all 4.      I will have him return to see me in 1 year.  At that time, I will repeat his echocardiogram as he will be 6 years out and we will see if his ascending aorta and aortic root are dilating at all.   Again, thank you for allowing me to participate in the care of your patient.      Sincerely,    Jan Park MD     [facility]

## 2017-09-06 NOTE — MR AVS SNAPSHOT
After Visit Summary   9/6/2017    Chris Barriga    MRN: [id number]           Patient Information     Date Of Birth          1960        Visit Information        Provider Department      9/6/2017 1:15 PM Jan Park MD [facility]        Today's Diagnoses     Coronary artery disease involving native coronary artery of native heart without angina pectoris    -  1    Benign essential hypertension        Pure hypercholesterolemia        Iliac artery aneurysm, left (H)        Aortic root dilation (H)           Follow-ups after your visit        Additional Services     Follow-Up with Cardiac Advanced Practice Provider           Follow-Up with Cardiologist                 Your next 10 appointments already scheduled     Oct 06, 2017  7:40 AM CDT   LAB with [facility]   [facility] ([facility])    [address] [place] [address] [address]  [facility] [id number] [phone]           Patient must bring picture ID. Patient should be prepared to give a urine specimen  Please do not eat 10-12 hours before your appointment if you are coming in fasting for labs on lipids, cholesterol, or glucose (sugar). Pregnant women should follow their Care Team instructions. Water with medications is okay. Do not drink coffee or other fluids. If you have concerns about taking  your medications, please ask at office or if scheduling via ManyWhot, send a message by clicking on Secure Messaging, Message Your Care Team.            Oct 06, 2017  8:30 AM CDT   Return Visit with BETH Martinez CNP   [facility] ([facility]    [address] [place] [address] [address]  [facility] [id number] [phone]              Future tests that were ordered for you today     Open Future Orders        Priority Expected Expires Ordered    Echocardiogram Routine 9/6/2018 9/7/2018 9/6/2017    Follow-Up with  "Cardiologist Routine 2018    Lipid Profile Routine 2018    Lipid Profile Routine 10/6/2017 2018 2017    ALT Routine 10/6/2017 2018 2017    Follow-Up with Cardiac Advanced Practice Provider Routine 10/6/2017 2018 2017            Who to contact     If you have questions or need follow up information about today's clinic visit or your schedule please contact [facility] [place] directly at [phone].  Normal or non-critical lab and imaging results will be communicated to you by MyChart, letter or phone within 4 business days after the clinic has received the results. If you do not hear from us within 7 days, please contact the clinic through Shopitizehart or phone. If you have a critical or abnormal lab result, we will notify you by phone as soon as possible.  Submit refill requests through [email or web address] or call your pharmacy and they will forward the refill request to us. Please allow 3 business days for your refill to be completed.          Additional Information About Your Visit        Shopitizehart Information     [email or web address] lets you send messages to your doctor, view your test results, renew your prescriptions, schedule appointments and more. To sign up, go to [email or web address] . Click on \"Log in\" on the left side of the screen, which will take you to the Welcome page. Then click on \"Sign up Now\" on the right side of the page.     You will be asked to enter the access code listed below, as well as some personal information. Please follow the directions to create your username and password.     Your access code is: [email or web address]  Expires: 2017  2:13 PM     Your access code will  in 90 days. If you need help or a new code, please call your [place] clinic or [phone].        Care EveryWhere ID     This is your Care EveryWhere ID. This could be used by other organizations to access your [place] medical " "records  [id number]        Your Vitals Were     Height BMI (Body Mass Index)                1.778 m (5' 10\") 25.61 kg/m2           Blood Pressure from Last 3 Encounters:   09/06/17 138/84   05/22/17 (!) 121/92   02/17/17 145/86    Weight from Last 3 Encounters:   09/06/17 81 kg (178 lb 8 oz)   05/22/17 77.1 kg (170 lb)   02/17/17 79.4 kg (175 lb)                 Today's Medication Changes          These changes are accurate as of: 9/6/17  2:13 PM.  If you have any questions, ask your nurse or doctor.               Start taking these medicines.        Dose/Directions    atorvastatin 10 MG tablet   Commonly known as:  LIPITOR   Used for:  Coronary artery disease involving native coronary artery of native heart without angina pectoris   Started by:  Jan Park MD        Dose:  10 mg   Take 1 tablet (10 mg) by mouth daily   Quantity:  30 tablet   Refills:  11         Stop taking these medicines if you haven't already. Please contact your care team if you have questions.     ROSUVASTATIN CALCIUM PO   Stopped by:  Jan Park MD                Where to get your medicines      These medications were sent to [facility]/pharmacy #[id number] [place] [id number] [address]     Phone:  [phone]     atorvastatin 10 MG tablet                Primary Care Provider Office Phone # Fax #    Bharathi Chowdary [phone]       [facility] [address] [place] [address] [place] [facility] [address]        Equal Access to Services     [facility] Center[facility]: Hadii sandi jiang hadasho Sodenia, waaxda luqadaha, qaybta kaalmada adeegyada, waxay dacia marie. So [facility] [phone].    ATENCIÓN: Si habla español, tiene a humphrey disposición servicios gratuitos de asistencia lingüística. Llame al [phone].    We comply with applicable federal civil rights laws and [place] laws. We do not discriminate on the basis of race, color, national origin, age, disability sex, sexual " orientation or gender identity.            Thank you!     Thank you for choosing [facility] [place]  for your care. Our goal is always to provide you with excellent care. Hearing back from our patients is one way we can continue to improve our services. Please take a few minutes to complete the written survey that you may receive in the mail after your visit with us. Thank you!             Your Updated Medication List - Protect others around you: Learn how to safely use, store and throw away your medicines at www.disposemymeds.org.          This list is accurate as of: 9/6/17  2:13 PM.  Always use your most recent med list.                   Brand Name Dispense Instructions for use Diagnosis    ASPIRIN EC PO      Take 81 mg by mouth daily        atorvastatin 10 MG tablet    LIPITOR    30 tablet    Take 1 tablet (10 mg) by mouth daily    Coronary artery disease involving native coronary artery of native heart without angina pectoris       fish oil-omega-3 fatty acids 1000 MG capsule      Take 1 g by mouth daily        flax seed oil 1000 MG capsule      Take 1 capsule by mouth daily        glucosamine-chondroitin 500-400 MG Caps per capsule      Take 2 capsules by mouth daily        hydrocortisone 0.2 % cream    WESTCORT     Apply topically 2 times daily as needed        losartan-hydrochlorothiazide 100-25 MG per tablet    HYZAAR     Take 1 tablet by mouth daily        Lysine 500 MG Tabs      Take 1 tablet by mouth daily.        magnesium 500 MG Tabs      Take 500 mg by mouth every evening        MINOCYCLINE HCL PO      Take 100 mg by mouth daily as needed (acne)        Multi-vitamin Tabs tablet   Generic drug:  multivitamin, therapeutic with minerals      Take 1 tablet by mouth daily.        nitroGLYcerin 0.4 MG sublingual tablet    NITROSTAT    75 tablet    Place 1 tablet under the tongue every 5 minutes as needed for chest pain for 3 doses. Administer every 5 minutes as needed.   Maximum 3 doses in 15 minutes.    CAD (coronary artery disease), Status post coronary angioplasty       Potassium Gluconate 595 (99 K) MG Tabs      Take 1 tablet by mouth daily

## 2017-09-06 NOTE — PROGRESS NOTES
HISTORY OF PRESENT ILLNESS:  Mr. Barriga is a very nice 57-year-old gentleman with past medical history significant for stenting of his proximal left anterior descending artery due to unstable angina in 2012.  He also has hypercholesterolemia.  He now returns to re-establish with Cardiology as much because he is having problems with side effects with his statins.      Chris works out 5 days a week.  He does alternating bike riding versus walking, walking up to 6 miles at a time, and states he has no symptoms at all related to any of these activities.  He has no dyspnea on exertion, orthopnea or PND.  No chest, arm, neck, jaw or shoulder discomfort.  No lightheadedness, dizziness, syncope or near-syncope.      He does relate that he has had problems with side effects to the statins over the years.  He was initially started on simvastatin, which has left him feeling lousy.  He ultimately switched to rosuvastatin, then at first he did not connect, but was having lots of problems with disrupted sleep at nighttime, vivid dreams and would wake up almost every night.  He on his own started to look through his medications to see if any were causing side effects and at one time took a holiday from each one.  Several weeks ago, he stopped taking his rosuvastatin and states he had a dramatic decrease in his dreams at nighttime and was oftentimes sleeping through the night again.  He now comes to ask what alternatives he may have.  He tries to follow a very healthy lifestyle.  He eats a Mediterranean style diet, having fish twice a week.  He eats a lot of flaxseed, fruits and vegetables.  He eats very little red meat.  He maintains ideal body weight.  As stated, he exercises 5-6 times a week to a moderate to high intensity for upwards to an hour or more.      Previous workup also demonstrated a mildly dilated aortic root at 3.8 cm and a mildly dilated left common femoral at 1.5.      ASSESSMENT AND PLAN:   1.  Chris does not  appear to have any symptoms to indicate ischemia, heart failure or significant arrhythmia.   2.  Fasting lipid profile checked in August was outstanding on 10 mg of rosuvastatin with total cholesterol of 128, HDL of 74, LDL of 42, triglycerides are 60.  We discussed the fact that different statins can have different side effects.  I will try making a change to atorvastatin 10 mg.  I told him that this is now a change of drug, but also a change in drug equivalency as 10 mg of atorvastatin is half the equivalence of 10 mg of rosuvastatin.  If he does not tolerate the atorvastatin, then I would switch him to pravastatin starting at 20 mg daily.  I will recheck a fasting lipid profile in 1 month and have him follow up with my GERONIMO.  If he is tolerating atorvastatin and we have good numbers, we will just continue as is.  If he is not tolerating atorvastatin, we will switch to pravastatin.  If he is tolerating atorvastatin, but yet has unacceptable numbers, then we can try bumping the dose up.      I congratulated him on his healthy lifestyle and encouraged him to continue to do so.  We talked about the fact that 80% of all cardiovascular disease comes down to not smoking, exercising regularly, eating a healthy diet, and maintaining ideal body weight, which he appears to be doing all 4.      I will have him return to see me in 1 year.  At that time, I will repeat his echocardiogram as he will be 6 years out and we will see if his ascending aorta and aortic root are dilating at all.         ZANDER SPENCER MD, [facility]             D: 2017 14:13   T: 2017 15:47   MT: LOS      Name:     NGA RIOS   MRN:      [id number]        Account:      [id number]   :      1960           Service Date: 2017      Document: [id number]

## 2017-09-06 NOTE — PROGRESS NOTES
HPI and Plan:   See dictation    Orders Placed This Encounter   Procedures     Lipid Profile     ALT     Lipid Profile     Follow-Up with Cardiac Advanced Practice Provider     Follow-Up with Cardiologist     Echocardiogram       Orders Placed This Encounter   Medications     atorvastatin (LIPITOR) 10 MG tablet     Sig: Take 1 tablet (10 mg) by mouth daily     Dispense:  30 tablet     Refill:  11       Medications Discontinued During This Encounter   Medication Reason     Homeopathic Products (ZICAM COLD REMEDY NA) Therapy completed     ROSUVASTATIN CALCIUM PO          Encounter Diagnoses   Name Primary?     Coronary artery disease involving native coronary artery of native heart without angina pectoris Yes     Benign essential hypertension      Pure hypercholesterolemia      Iliac artery aneurysm, left (H)      Aortic root dilation (H)        CURRENT MEDICATIONS:  Current Outpatient Prescriptions   Medication Sig Dispense Refill     atorvastatin (LIPITOR) 10 MG tablet Take 1 tablet (10 mg) by mouth daily 30 tablet 11     hydrocortisone (WESTCORT) 0.2 % cream Apply topically 2 times daily as needed        magnesium 500 MG TABS Take 500 mg by mouth every evening       Potassium Gluconate 595 (99 K) MG TABS Take 1 tablet by mouth daily       Flaxseed, Linseed, (FLAX SEED OIL) 1000 MG capsule Take 1 capsule by mouth daily       losartan-hydrochlorothiazide (HYZAAR) 100-25 MG per tablet Take 1 tablet by mouth daily       ASPIRIN EC PO Take 81 mg by mouth daily       fish oil-omega-3 fatty acids (FISH OIL) 1000 MG capsule Take 1 g by mouth daily        glucosamine-chondroitin 500-400 MG CAPS Take 2 capsules by mouth daily        Lysine 500 MG TABS Take 1 tablet by mouth daily.       MINOCYCLINE HCL PO Take 100 mg by mouth daily as needed (acne)        Multiple Vitamin (MULTI-VITAMIN) per tablet Take 1 tablet by mouth daily.       nitroglycerin (NITROSTAT) 0.4 MG SL tablet Place 1 tablet under the tongue every 5 minutes  as needed for chest pain for 3 doses. Administer every 5 minutes as needed.  Maximum 3 doses in 15 minutes. 75 tablet 3       ALLERGIES     Allergies   Allergen Reactions     Seasonal Allergies      Vicodin [Hydrocodone-Acetaminophen] Other (See Comments)     FEELS WIRED       PAST MEDICAL HISTORY:  Past Medical History:   Diagnosis Date     Adenoma of ascending colon      Aortic root dilation (H)      Chest pain      Coronary artery disease     5/2012: Stent to proximal LAD for unstable angina. Proximal left circumflex 30-40%. EF 60%     HTN (hypertension)      Hyperlipidaemia      Iliac artery aneurysm, left (H)        PAST SURGICAL HISTORY:  Past Surgical History:   Procedure Laterality Date     APPENDECTOMY OPEN       ARTHROSCOPY KNEE BILATERAL      both knees     ARTHROSCOPY SHOULDER      left     C ORAL SURGERY PROCEDURE       CARDIAC SURGERY      stent x 1     COLONOSCOPY N/A 2/17/2017    Procedure: COLONOSCOPY;  Surgeon: Junie Grier MD;  Location:  OR     COLONOSCOPY  12/2016    [place] [facility] [place]     COLONOSCOPY  05/22/2017    Dr. Grier [facility]     LAPAROSCOPIC HERNIORRHAPHY INGUINAL  12/30/2013    Procedure: LAPAROSCOPIC HERNIORRHAPHY INGUINAL;  LAPAROSCOPIC RIGHT INGUINAL HERNIA REPAIR WITH MESH;  Surgeon: Reji Pizano MD;  Location: [facility]     ORTHOPEDIC SURGERY      scope of both shoulders     VASECTOMY         FAMILY HISTORY:  Family History   Problem Relation Age of Onset     Breast Cancer Mother      CEREBROVASCULAR DISEASE Father      Breast Cancer Maternal Grandmother      Breast Cancer Son        SOCIAL HISTORY:  Social History     Social History     Marital status:      Spouse name: N/A     Number of children: N/A     Years of education: N/A     Social History Main Topics     Smoking status: Never Smoker     Smokeless tobacco: Never Used     Alcohol use Yes      Comment: 2-3 per week     Drug use: No     Sexual activity: Not Asked     Other Topics Concern     None  "    Social History Narrative       Review of Systems:  Skin:  Negative       Eyes:  Positive for glasses    ENT:  Negative      Respiratory:  Negative       Cardiovascular:  Negative      Gastroenterology: Negative      Genitourinary:  Negative      Musculoskeletal:  Positive for arthritis    Neurologic:  Negative      Psychiatric:  Negative      Heme/Lymph/Imm:  Positive for allergies    Endocrine:  Negative        Physical Exam:  Vitals: /84  Ht 1.778 m (5' 10\")  Wt 81 kg (178 lb 8 oz)  BMI 25.61 kg/m2    Constitutional:  cooperative, alert and oriented, well developed, well nourished, in no acute distress   fit appearing    Skin:  warm and dry to the touch, no apparent skin lesions or masses noted        Head:  normocephalic, no masses or lesions        Eyes:  pupils equal and round, conjunctivae and lids unremarkable, sclera white, no xanthalasma, EOMS intact, no nystagmus        ENT:  no pallor or cyanosis, dentition good        Neck:  carotid pulses are full and equal bilaterally;no carotid bruit        Chest:  normal breath sounds, clear to auscultation, normal A-P diameter, normal symmetry, normal respiratory excursion, no use of accessory muscles          Cardiac: regular rhythm;normal S1 and S2;no murmurs, gallops or rubs detected                  Abdomen:           Vascular: pulses full and equal                                        Extremities and Back:  no edema;no spinal abnormalities noted;normal muscle strength and tone              Neurological:  affect appropriate, oriented to time, person and place;no gross motor deficits              СВЕТЛАНА Chowdary MD  [facility]  [address] [place] [address]              "

## 2017-10-06 ENCOUNTER — OFFICE VISIT (OUTPATIENT)
Dept: CARDIOLOGY | Facility: CLINIC | Age: 57
End: 2017-10-06
Attending: INTERNAL MEDICINE
Payer: COMMERCIAL

## 2017-10-06 VITALS
DIASTOLIC BLOOD PRESSURE: 84 MMHG | SYSTOLIC BLOOD PRESSURE: 152 MMHG | HEART RATE: 72 BPM | BODY MASS INDEX: 25.74 KG/M2 | WEIGHT: 179.8 LBS | HEIGHT: 70 IN

## 2017-10-06 DIAGNOSIS — I25.10 CORONARY ARTERY DISEASE INVOLVING NATIVE CORONARY ARTERY OF NATIVE HEART WITHOUT ANGINA PECTORIS: ICD-10-CM

## 2017-10-06 LAB
ALT SERPL W P-5'-P-CCNC: 6 U/L (ref 5–30)
CHOLEST SERPL-MCNC: 143 MG/DL
HDLC SERPL-MCNC: 71 MG/DL
LDLC SERPL CALC-MCNC: 55 MG/DL
NONHDLC SERPL-MCNC: 72 MG/DL
TRIGL SERPL-MCNC: 86 MG/DL

## 2017-10-06 PROCEDURE — 80061 LIPID PANEL: CPT | Performed by: INTERNAL MEDICINE

## 2017-10-06 PROCEDURE — 99214 OFFICE O/P EST MOD 30 MIN: CPT | Performed by: NURSE PRACTITIONER

## 2017-10-06 PROCEDURE — 84460 ALANINE AMINO (ALT) (SGPT): CPT | Performed by: INTERNAL MEDICINE

## 2017-10-06 PROCEDURE — 36415 COLL VENOUS BLD VENIPUNCTURE: CPT | Performed by: INTERNAL MEDICINE

## 2017-10-06 RX ORDER — ATORVASTATIN CALCIUM 10 MG/1
10 TABLET, FILM COATED ORAL DAILY
Qty: 90 TABLET | Refills: 3 | Status: SHIPPED | OUTPATIENT
Start: 2017-10-06 | End: 2017-10-23

## 2017-10-06 RX ORDER — LORATADINE 10 MG/1
10 CAPSULE, LIQUID FILLED ORAL DAILY
COMMUNITY

## 2017-10-06 NOTE — MR AVS SNAPSHOT
"              After Visit Summary   10/6/2017    Chris Barriga    MRN: [id number]           Patient Information     Date Of Birth          1960        Visit Information        Provider Department      10/6/2017 8:30 AM Radha Carver APRN CNP [facility]        Today's Diagnoses     Coronary artery disease involving native coronary artery of native heart without angina pectoris          Care Instructions    Continue with lipitor 10 mg daily    We will see you back next year          Follow-ups after your visit        Who to contact     If you have questions or need follow up information about today's clinic visit or your schedule please contact [facility] directly at [phone].  Normal or non-critical lab and imaging results will be communicated to you by MyChart, letter or phone within 4 business days after the clinic has received the results. If you do not hear from us within 7 days, please contact the clinic through MyChart or phone. If you have a critical or abnormal lab result, we will notify you by phone as soon as possible.  Submit refill requests through [employer] or call your pharmacy and they will forward the refill request to us. Please allow 3 business days for your refill to be completed.          Additional Information About Your Visit        MyChart Information     [employer] lets you send messages to your doctor, view your test results, renew your prescriptions, schedule appointments and more. To sign up, go to [email or web address] . Click on \"Log in\" on the left side of the screen, which will take you to the Welcome page. Then click on \"Sign up Now\" on the right side of the page.     You will be asked to enter the access code listed below, as well as some personal information. Please follow the directions to create your username and password.     Your access code is: [email or web address]  Expires: 12/5/2017  2:13 PM   " "  Your access code will  in 90 days. If you need help or a new code, please call your [facility] clinic or [phone].        Care EveryWhere ID     This is your Care EveryWhere ID. This could be used by other organizations to access your [facility] medical records  [id number]        Your Vitals Were     Pulse Height BMI (Body Mass Index)             72 1.778 m (5' 10\") 25.8 kg/m2          Blood Pressure from Last 3 Encounters:   10/06/17 152/84   17 138/84   17 (!) 121/92    Weight from Last 3 Encounters:   10/06/17 81.6 kg (179 lb 12.8 oz)   17 81 kg (178 lb 8 oz)   17 77.1 kg (170 lb)              We Performed the Following     Follow-Up with Cardiac Advanced Practice Provider          Where to get your medicines      These medications were sent to [facility]/pharmacy #[id number] [facility] [address] [facility]  [address] [place] [address]     Phone:  [phone]     atorvastatin 10 MG tablet          Primary Care Provider Office Phone # Fax #    Bharathi Chowdary [phone]       [facility] [address] [place] [address] [facility] [address]        Equal Access to Services     VERNA PAYTON : Hadii sandi ku hadasho Soomaali, waaxda luqadaha, qaybta kaalmada adeegyada, koki marie. So [facility] [phone].    ATENCIÓN: Si habla español, tiene a humphrey disposición servicios gratuitos de asistencia lingüística. Llame al [phone].    We comply with applicable federal civil rights laws and [place] laws. We do not discriminate on the basis of race, color, national origin, age, disability, sex, sexual orientation, or gender identity.            Thank you!     Thank you for choosing [facility] [place]  for your care. Our goal is always to provide you with excellent care. Hearing back from our patients is one way we can continue to improve our services. Please take a few minutes to complete the written survey that you may " receive in the mail after your visit with us. Thank you!             Your Updated Medication List - Protect others around you: Learn how to safely use, store and throw away your medicines at www.disposemymeds.org.          This list is accurate as of: 10/6/17  9:04 AM.  Always use your most recent med list.                   Brand Name Dispense Instructions for use Diagnosis    ASPIRIN EC PO      Take 81 mg by mouth daily        atorvastatin 10 MG tablet    LIPITOR    90 tablet    Take 1 tablet (10 mg) by mouth daily    Coronary artery disease involving native coronary artery of native heart without angina pectoris       CLARITIN 10 MG capsule   Generic drug:  loratadine      Take 10 mg by mouth daily        fish oil-omega-3 fatty acids 1000 MG capsule      Take 1 g by mouth daily        flax seed oil 1000 MG capsule      Take 1 capsule by mouth daily        glucosamine-chondroitin 500-400 MG Caps per capsule      Take 2 capsules by mouth daily        hydrocortisone 0.2 % cream    WESTCORT     Apply topically 2 times daily as needed        losartan-hydrochlorothiazide 100-25 MG per tablet    HYZAAR     Take 1 tablet by mouth daily        Lysine 500 MG Tabs      Take 1 tablet by mouth daily.        magnesium 500 MG Tabs      Take 500 mg by mouth every evening        MINOCYCLINE HCL PO      Take 100 mg by mouth daily as needed (acne)        Multi-vitamin Tabs tablet   Generic drug:  multivitamin, therapeutic with minerals      Take 1 tablet by mouth daily.        nitroGLYcerin 0.4 MG sublingual tablet    NITROSTAT    75 tablet    Place 1 tablet under the tongue every 5 minutes as needed for chest pain for 3 doses. Administer every 5 minutes as needed.  Maximum 3 doses in 15 minutes.    CAD (coronary artery disease), Status post coronary angioplasty       Potassium Gluconate 595 (99 K) MG Tabs      Take 1 tablet by mouth daily

## 2017-10-06 NOTE — LETTER
10/6/2017    Bharathi Chowdary MD  [facility] [address]  [place] [address]    RE: Chris Gomezohue       Dear Colleague,    I had the pleasure of seeing hCris Gomezohue in the [facility].    Chava is a 57-year-old gentleman who is here today for evaluation of his hyperlipidemia.  His past medical history is significant for stenting of his proximal LAD artery due to unstable angina in 2012.  His cardiac risk factors include hyperlipidemia and hypertension.  He was recently evaluated by Dr. Park.  At that visit, he reviewed problems with statins.  Initially, he was started on simvastatin, which made him feel unwell.  He was switched to rosuvastatin which caused problems with disrupted sleep at night, vivid dreams and interrupted sleep.  He took a drug holiday from the rosuvastatin and he had a dramatic decrease in his dreams at night and the quality of his sleep improved.  Therefore, in discussion with Dr. Park, he was then started on atorvastatin at 10 mg per day.  This gentleman has a healthy lifestyle.  He exercises regularly.  He eats a Mediterranean style diet, fruit, vegetables, flaxseed, very little red meat.  He maintains an ideal body weight, exercises 5-6 times a week with moderate to high intensity for upwards to an hour or more.      Previous workup demonstrated a mildly dilated aortic root at 3.8 cm and mildly dilated left common femoral artery at 1.5.      The patient checks his blood pressure at home 1-2 times a week.  Generally, it runs between 128-134 mmHg over the 70s.  Today, Chava and I reviewed the results of his lipids.  Today his cholesterol is 143, HDL 71, LDL 55, triglycerides 86.  Blood pressure is elevated today at 152/84.  Patient is on losartan-hydrochlorothiazide 100/25 per day.  His blood pressure is managed by his primary team.      Outpatient Encounter Prescriptions as of 10/6/2017   Medication Sig Dispense Refill     loratadine (CLARITIN) 10 MG  capsule Take 10 mg by mouth daily       atorvastatin (LIPITOR) 10 MG tablet Take 1 tablet (10 mg) by mouth daily 90 tablet 3     hydrocortisone (WESTCORT) 0.2 % cream Apply topically 2 times daily as needed        magnesium 500 MG TABS Take 500 mg by mouth every evening       Potassium Gluconate 595 (99 K) MG TABS Take 1 tablet by mouth daily       Flaxseed, Linseed, (FLAX SEED OIL) 1000 MG capsule Take 1 capsule by mouth daily       losartan-hydrochlorothiazide (HYZAAR) 100-25 MG per tablet Take 1 tablet by mouth daily       ASPIRIN EC PO Take 81 mg by mouth daily       fish oil-omega-3 fatty acids (FISH OIL) 1000 MG capsule Take 1 g by mouth daily        glucosamine-chondroitin 500-400 MG CAPS Take 2 capsules by mouth daily        Lysine 500 MG TABS Take 1 tablet by mouth daily.       MINOCYCLINE HCL PO Take 100 mg by mouth daily as needed (acne)        Multiple Vitamin (MULTI-VITAMIN) per tablet Take 1 tablet by mouth daily.       nitroglycerin (NITROSTAT) 0.4 MG SL tablet Place 1 tablet under the tongue every 5 minutes as needed for chest pain for 3 doses. Administer every 5 minutes as needed.  Maximum 3 doses in 15 minutes. 75 tablet 3     [DISCONTINUED] atorvastatin (LIPITOR) 10 MG tablet Take 1 tablet (10 mg) by mouth daily 30 tablet 11     No facility-administered encounter medications on file as of 10/6/2017.        ASSESSMENT AND PLAN:   1.  History of coronary artery disease.  The patient is free of symptoms of ischemia, heart failure or any arrhythmias.  He will continue with his statin, low-dose aspirin and healthy lifestyle.   2.  Hyperlipidemia.  His fasting lipid profile today is very acceptable.  He will continue with atorvastatin at 10 mg a day.  A prescription was given.  The only symptoms he has reported were 2 headaches and 2 separate episodes of lightheadedness.  The patient does not believe these are related.  He is going to continue to observe for these symptoms.      Chava will follow up with  Dr. Park in 1 year.  There is an echo ordered to evaluate his ascending aorta and aortic arch.      It has been my pleasure to meet Chava today.      Sincerely,    BETH Goldstein CNP     [facility]

## 2017-10-06 NOTE — PROGRESS NOTES
HISTORY OF PRESENT ILLNESS:  Chava is a 57-year-old gentleman who is here today for evaluation of his hyperlipidemia.  His past medical history is significant for stenting of his proximal LAD artery due to unstable angina in 2012.  His cardiac risk factors include hyperlipidemia and hypertension.  He was recently evaluated by Dr. Park.  At that visit, he reviewed problems with statins.  Initially, he was started on simvastatin, which made him feel unwell.  He was switched to rosuvastatin which caused problems with disrupted sleep at night, vivid dreams and interrupted sleep.  He took a drug holiday from the rosuvastatin and he had a dramatic decrease in his dreams at night and the quality of his sleep improved.  Therefore, in discussion with Dr. Park, he was then started on atorvastatin at 10 mg per day.  This gentleman has a healthy lifestyle.  He exercises regularly.  He eats a Mediterranean style diet, fruit, vegetables, flaxseed, very little red meat.  He maintains an ideal body weight, exercises 5-6 times a week with moderate to high intensity for upwards to an hour or more.      Previous workup demonstrated a mildly dilated aortic root at 3.8 cm and mildly dilated left common femoral artery at 1.5.      The patient checks his blood pressure at home 1-2 times a week.  Generally, it runs between 128-134 mmHg over the 70s.  Today, Chava and I reviewed the results of his lipids.  Today his cholesterol is 143, HDL 71, LDL 55, triglycerides 86.  Blood pressure is elevated today at 152/84.  Patient is on losartan-hydrochlorothiazide 100/25 per day.  His blood pressure is managed by his primary team.      ASSESSMENT AND PLAN:   1.  History of coronary artery disease.  The patient is free of symptoms of ischemia, heart failure or any arrhythmias.  He will continue with his statin, low-dose aspirin and healthy lifestyle.   2.  Hyperlipidemia.  His fasting lipid profile today is very acceptable.  He will continue  with atorvastatin at 10 mg a day.  A prescription was given.  The only symptoms he has reported were 2 headaches and 2 separate episodes of lightheadedness.  The patient does not believe these are related.  He is going to continue to observe for these symptoms.      Chava will follow up with Dr. Park in 1 year.  There is an echo ordered to evaluate his ascending aorta and aortic arch.      It has been my pleasure to meet Chava today.      Radha Carver MS, ANP         BETH PÉREZ, CNP             D: 10/06/2017 09:10   T: 10/06/2017 09:53   MT: al      Name:     NGA RIOS   MRN:      [id number]        Account:      [id number]   :      1960           Service Date: 10/06/2017      Document: [id number]

## 2017-10-23 DIAGNOSIS — I25.10 CORONARY ARTERY DISEASE INVOLVING NATIVE CORONARY ARTERY OF NATIVE HEART WITHOUT ANGINA PECTORIS: ICD-10-CM

## 2017-10-23 RX ORDER — ATORVASTATIN CALCIUM 10 MG/1
10 TABLET, FILM COATED ORAL DAILY
Qty: 90 TABLET | Refills: 3 | Status: SHIPPED | OUTPATIENT
Start: 2017-10-23 | End: 2018-11-06

## 2018-05-29 ENCOUNTER — HOSPITAL ENCOUNTER (OUTPATIENT)
Facility: CLINIC | Age: 58
Discharge: HOME | End: 2018-05-29
Attending: COLON & RECTAL SURGERY | Admitting: COLON & RECTAL SURGERY
Payer: COMMERCIAL

## 2018-05-29 VITALS
OXYGEN SATURATION: 99 % | DIASTOLIC BLOOD PRESSURE: 87 MMHG | RESPIRATION RATE: 17 BRPM | SYSTOLIC BLOOD PRESSURE: 133 MMHG

## 2018-05-29 LAB — COLONOSCOPY: NORMAL

## 2018-05-29 PROCEDURE — G0105 COLORECTAL SCRN; HI RISK IND: HCPCS | Performed by: COLON & RECTAL SURGERY

## 2018-05-29 PROCEDURE — 25000128 H RX IP 250 OP 636: Performed by: COLON & RECTAL SURGERY

## 2018-05-29 PROCEDURE — G0500 MOD SEDAT ENDO SERVICE >5YRS: HCPCS | Performed by: COLON & RECTAL SURGERY

## 2018-05-29 PROCEDURE — 45378 DIAGNOSTIC COLONOSCOPY: CPT | Performed by: COLON & RECTAL SURGERY

## 2018-05-29 RX ORDER — NALOXONE HYDROCHLORIDE 0.4 MG/ML
.1-.4 INJECTION, SOLUTION INTRAMUSCULAR; INTRAVENOUS; SUBCUTANEOUS
Status: DISCONTINUED | OUTPATIENT
Start: 2018-05-29 | End: 2018-05-29 | Stop reason: HOSPADM

## 2018-05-29 RX ORDER — FLUMAZENIL 0.1 MG/ML
0.2 INJECTION, SOLUTION INTRAVENOUS
Status: DISCONTINUED | OUTPATIENT
Start: 2018-05-29 | End: 2018-05-29 | Stop reason: HOSPADM

## 2018-05-29 RX ORDER — ONDANSETRON 2 MG/ML
4 INJECTION INTRAMUSCULAR; INTRAVENOUS EVERY 6 HOURS PRN
Status: DISCONTINUED | OUTPATIENT
Start: 2018-05-29 | End: 2018-05-29 | Stop reason: HOSPADM

## 2018-05-29 RX ORDER — ONDANSETRON 2 MG/ML
4 INJECTION INTRAMUSCULAR; INTRAVENOUS
Status: DISCONTINUED | OUTPATIENT
Start: 2018-05-29 | End: 2018-05-29 | Stop reason: HOSPADM

## 2018-05-29 RX ORDER — ONDANSETRON 4 MG/1
4 TABLET, ORALLY DISINTEGRATING ORAL EVERY 6 HOURS PRN
Status: DISCONTINUED | OUTPATIENT
Start: 2018-05-29 | End: 2018-05-29 | Stop reason: HOSPADM

## 2018-05-29 RX ORDER — LIDOCAINE 40 MG/G
CREAM TOPICAL
Status: DISCONTINUED | OUTPATIENT
Start: 2018-05-29 | End: 2018-05-29 | Stop reason: HOSPADM

## 2018-05-29 RX ORDER — FENTANYL CITRATE 50 UG/ML
INJECTION, SOLUTION INTRAMUSCULAR; INTRAVENOUS PRN
Status: DISCONTINUED | OUTPATIENT
Start: 2018-05-29 | End: 2018-05-29 | Stop reason: HOSPADM

## 2018-05-29 NOTE — OP NOTE
See Provation Note In Chart    Junie Grier MD  [facility]  Office Phone # [phone]

## 2018-05-29 NOTE — H&P
Pre-Endoscopy History and Physical     Chris Barriga MRN# [id number]   YOB: 1960 Age: 57 year old     Date of Procedure: 5/29/2018  Primary care provider: Bharathi Chowdary  Type of Endoscopy: colonoscopy  Reason for Procedure: surveillance  Type of Anesthesia Anticipated: Moderate Sedation    HPI:    Chris is a 57 year old male who will be undergoing the above procedure.      A history and physical has been performed. The patient's medications and allergies have been reviewed. The risks and benefits of the procedure and the sedation options and risks were discussed with the patient.  All questions were answered and informed consent was obtained.      He denies a personal or family history of anesthesia complications or bleeding disorders.     Allergies   Allergen Reactions     Seasonal Allergies      Vicodin [Hydrocodone-Acetaminophen] Other (See Comments)     FEELS WIRED        Prior to Admission Medications   Prescriptions Last Dose Informant Patient Reported? Taking?   ASPIRIN EC PO 5/28/2018 Self Yes Yes   Sig: Take 81 mg by mouth daily   Flaxseed, Linseed, (FLAX SEED OIL) 1000 MG capsule Past Week Self Yes Yes   Sig: Take 1 capsule by mouth daily   Lysine 500 MG TABS Past Week Self Yes Yes   Sig: Take 1 tablet by mouth daily.   MINOCYCLINE HCL PO 5/28/2018 Self Yes Yes   Sig: Take 100 mg by mouth daily as needed (acne)    Multiple Vitamin (MULTI-VITAMIN) per tablet Past Week Self Yes Yes   Sig: Take 1 tablet by mouth daily.   Potassium Gluconate 595 (99 K) MG TABS Past Week Self Yes Yes   Sig: Take 1 tablet by mouth daily   atorvastatin (LIPITOR) 10 MG tablet 5/28/2018  No Yes   Sig: Take 1 tablet (10 mg) by mouth daily   fish oil-omega-3 fatty acids (FISH OIL) 1000 MG capsule  Self Yes No   Sig: Take 1 g by mouth daily    glucosamine-chondroitin 500-400 MG CAPS Past Week Self Yes Yes   Sig: Take 2 capsules by mouth daily    hydrocortisone (WESTCORT) 0.2 % cream Unknown Self Yes No   Sig: Apply  topically 2 times daily as needed    loratadine (CLARITIN) 10 MG capsule 5/28/2018  Yes Yes   Sig: Take 10 mg by mouth daily   losartan-hydrochlorothiazide (HYZAAR) 100-25 MG per tablet 5/28/2018 Self Yes Yes   Sig: Take 1 tablet by mouth daily   magnesium 500 MG TABS Past Week Self Yes Yes   Sig: Take 500 mg by mouth every evening   nitroglycerin (NITROSTAT) 0.4 MG SL tablet  Self No No   Sig: Place 1 tablet under the tongue every 5 minutes as needed for chest pain for 3 doses. Administer every 5 minutes as needed.  Maximum 3 doses in 15 minutes.      Facility-Administered Medications: None       Patient Active Problem List   Diagnosis     Pain in joint, lower leg     Other postprocedural status(V45.89)     Iliac artery aneurysm, left (H)     Coronary artery disease involving native coronary artery of native heart without angina pectoris     Benign essential hypertension     Pure hypercholesterolemia     Aortic root dilation (H)        Past Medical History:   Diagnosis Date     Adenoma of ascending colon      Aortic root dilation (H)      Chest pain      Coronary artery disease     5/2012: Stent to proximal LAD for unstable angina. Proximal left circumflex 30-40%. EF 60%     HTN (hypertension)      Hyperlipidaemia      Iliac artery aneurysm, left (H)         Past Surgical History:   Procedure Laterality Date     APPENDECTOMY OPEN       ARTHROSCOPY KNEE BILATERAL      both knees     ARTHROSCOPY SHOULDER      left     C ORAL SURGERY PROCEDURE       CARDIAC SURGERY      stent x 1     COLONOSCOPY N/A 2/17/2017    Procedure: COLONOSCOPY;  Surgeon: Junie Grier MD;  Location:  OR     COLONOSCOPY  12/2016    [place] [facility] [place]     COLONOSCOPY  05/22/2017    Dr. Grier [facility]     LAPAROSCOPIC HERNIORRHAPHY INGUINAL  12/30/2013    Procedure: LAPAROSCOPIC HERNIORRHAPHY INGUINAL;  LAPAROSCOPIC RIGHT INGUINAL HERNIA REPAIR WITH MESH;  Surgeon: Reji Pizano MD;  Location: [facility]     ORTHOPEDIC SURGERY       "scope of both shoulders     VASECTOMY         Social History   Substance Use Topics     Smoking status: Never Smoker     Smokeless tobacco: Never Used     Alcohol use Yes      Comment: 2-3 per week       Family History   Problem Relation Age of Onset     Breast Cancer Mother      CEREBROVASCULAR DISEASE Father      Breast Cancer Maternal Grandmother      Breast Cancer Son      Colon Cancer No family hx of        REVIEW OF SYSTEMS:     5 point ROS negative except as noted above in HPI, including Gen., Resp., CV, GI &  system review.      PHYSICAL EXAM:   There were no vitals taken for this visit. Estimated body mass index is 25.8 kg/(m^2) as calculated from the following:    Height as of 10/6/17: 1.778 m (5' 10\").    Weight as of 10/6/17: 81.6 kg (179 lb 12.8 oz).   GENERAL APPEARANCE: healthy and alert  MENTAL STATUS: alert  AIRWAY EXAM: Mallampatti Class II (visualization of the soft palate, fauces, and uvula)  RESP: lungs clear to auscultation - no rales, rhonchi or wheezes  CV: regular rates and rhythm      DIAGNOSTICS:    Not indicated      IMPRESSION   ASA Class 2 - Mild systemic disease        PLAN:       Plan for colonoscopy. We discussed the risks, benefits and alternatives and the patient wished to proceed.    The above has been forwarded to the consulting provider.      Signed Electronically by: Junie Grier MD  May 29, 2018    "

## 2018-09-04 LAB
ALBUMIN SERPL-MCNC: 4.5 G/DL
ALP SERPL-CCNC: 67 U/L
ALT SERPL-CCNC: 27 U/L
ANION GAP SERPL CALCULATED.3IONS-SCNC: 7 MMOL/L
AST SERPL-CCNC: 21 U/L
BILIRUB SERPL-MCNC: 1 MG/DL
BUN SERPL-MCNC: 21 MG/DL
CALCIUM SERPL-MCNC: 9.7 MG/DL
CHLORIDE SERPLBLD-SCNC: 103 MMOL/L
CHOLEST SERPL-MCNC: 149 MG/DL
CO2 SERPL-SCNC: 28 MMOL/L
CREAT SERPL-MCNC: 0.86 MG/DL
GFR SERPL CREATININE-BSD FRML MDRD: >60 ML/MIN/1.73M2
GLUCOSE SERPL-MCNC: 97 MG/DL (ref 70–99)
HDLC SERPL-MCNC: 74 MG/DL
LDLC SERPL CALC-MCNC: 60 MG/DL
NONHDLC SERPL-MCNC: 75 MG/DL
POTASSIUM SERPL-SCNC: 3.9 MMOL/L
PROT SERPL-MCNC: 6.7 G/DL
SODIUM SERPL-SCNC: 138 MMOL/L
TRIGL SERPL-MCNC: 74 MG/DL

## 2018-10-15 DIAGNOSIS — I77.810 AORTIC ROOT DILATATION (H): Primary | ICD-10-CM

## 2018-10-15 DIAGNOSIS — I25.10 CORONARY ARTERY DISEASE INVOLVING NATIVE CORONARY ARTERY OF NATIVE HEART WITHOUT ANGINA PECTORIS: ICD-10-CM

## 2018-10-17 ENCOUNTER — PRE VISIT (OUTPATIENT)
Dept: CARDIOLOGY | Facility: CLINIC | Age: 58
End: 2018-10-17

## 2018-10-17 ENCOUNTER — HOSPITAL ENCOUNTER (OUTPATIENT)
Dept: CARDIOLOGY | Facility: CLINIC | Age: 58
Discharge: HOME | End: 2018-10-17
Attending: INTERNAL MEDICINE | Admitting: INTERNAL MEDICINE
Payer: COMMERCIAL

## 2018-10-17 DIAGNOSIS — I25.10 CORONARY ARTERY DISEASE INVOLVING NATIVE CORONARY ARTERY OF NATIVE HEART WITHOUT ANGINA PECTORIS: ICD-10-CM

## 2018-10-17 LAB
CHOLEST SERPL-MCNC: 120 MG/DL
HDLC SERPL-MCNC: 65 MG/DL
LDLC SERPL CALC-MCNC: 38 MG/DL
NONHDLC SERPL-MCNC: 55 MG/DL
TRIGL SERPL-MCNC: 83 MG/DL

## 2018-10-17 PROCEDURE — 36415 COLL VENOUS BLD VENIPUNCTURE: CPT | Performed by: INTERNAL MEDICINE

## 2018-10-17 PROCEDURE — 93306 TTE W/DOPPLER COMPLETE: CPT

## 2018-10-17 PROCEDURE — 93306 TTE W/DOPPLER COMPLETE: CPT | Mod: 26 | Performed by: INTERNAL MEDICINE

## 2018-10-17 PROCEDURE — 80061 LIPID PANEL: CPT | Performed by: INTERNAL MEDICINE

## 2018-10-17 NOTE — TELEPHONE ENCOUNTER
Pt had echo and lipid panel 10/17/2018. Echo from that day showed a decrease in EF from 55-60% in 2012 to 45-50%. Initial plan was to have OV with Dr Park on 10/22/18 but patient cancelled as he will be travelling, rescheduled for 11/13/2018.     Patient called, states no shortness of breath, weight gain, or fatigue.      Echo and labs will be reviewed at OV but will route results to Dr Park for review.

## 2018-11-06 ENCOUNTER — PRE VISIT (OUTPATIENT)
Dept: CARDIOLOGY | Facility: CLINIC | Age: 58
End: 2018-11-06

## 2018-11-06 DIAGNOSIS — I25.10 CORONARY ARTERY DISEASE INVOLVING NATIVE CORONARY ARTERY OF NATIVE HEART WITHOUT ANGINA PECTORIS: ICD-10-CM

## 2018-11-06 RX ORDER — ATORVASTATIN CALCIUM 10 MG/1
10 TABLET, FILM COATED ORAL DAILY
Qty: 90 TABLET | Refills: 0 | Status: SHIPPED | OUTPATIENT
Start: 2018-11-06 | End: 2018-11-13

## 2018-11-13 ENCOUNTER — OFFICE VISIT (OUTPATIENT)
Dept: CARDIOLOGY | Facility: CLINIC | Age: 58
End: 2018-11-13
Payer: COMMERCIAL

## 2018-11-13 VITALS
DIASTOLIC BLOOD PRESSURE: 78 MMHG | HEART RATE: 76 BPM | HEIGHT: 70 IN | SYSTOLIC BLOOD PRESSURE: 126 MMHG | BODY MASS INDEX: 25.31 KG/M2 | WEIGHT: 176.8 LBS

## 2018-11-13 DIAGNOSIS — I10 BENIGN ESSENTIAL HYPERTENSION: ICD-10-CM

## 2018-11-13 DIAGNOSIS — I77.810 AORTIC ROOT DILATION (H): ICD-10-CM

## 2018-11-13 DIAGNOSIS — I25.5 ISCHEMIC CARDIOMYOPATHY: ICD-10-CM

## 2018-11-13 DIAGNOSIS — E78.00 PURE HYPERCHOLESTEROLEMIA: ICD-10-CM

## 2018-11-13 DIAGNOSIS — I25.10 CORONARY ARTERY DISEASE INVOLVING NATIVE CORONARY ARTERY OF NATIVE HEART WITHOUT ANGINA PECTORIS: Primary | ICD-10-CM

## 2018-11-13 PROCEDURE — 99214 OFFICE O/P EST MOD 30 MIN: CPT | Performed by: INTERNAL MEDICINE

## 2018-11-13 RX ORDER — ATORVASTATIN CALCIUM 10 MG/1
10 TABLET, FILM COATED ORAL EVERY OTHER DAY
Qty: 90 TABLET | Refills: 3
Start: 2018-11-13 | End: 2018-12-04

## 2018-11-13 NOTE — PROGRESS NOTES
HPI and Plan:   See dictation    Orders Placed This Encounter   Procedures     Lipid Profile     Follow-Up with Cardiac Advanced Practice Provider     Follow-Up with Cardiologist       Orders Placed This Encounter   Medications     atorvastatin (LIPITOR) 10 MG tablet     Sig: Take 1 tablet (10 mg) by mouth every other day     Dispense:  90 tablet     Refill:  3       Medications Discontinued During This Encounter   Medication Reason     atorvastatin (LIPITOR) 10 MG tablet Reorder         Encounter Diagnoses   Name Primary?     Coronary artery disease involving native coronary artery of native heart without angina pectoris Yes     Aortic root dilation (H)      Benign essential hypertension      Pure hypercholesterolemia      Ischemic cardiomyopathy        CURRENT MEDICATIONS:  Current Outpatient Prescriptions   Medication Sig Dispense Refill     ASPIRIN EC PO Take 81 mg by mouth daily       atorvastatin (LIPITOR) 10 MG tablet Take 1 tablet (10 mg) by mouth every other day 90 tablet 3     fish oil-omega-3 fatty acids (FISH OIL) 1000 MG capsule Take 1 g by mouth daily        Flaxseed, Linseed, (FLAX SEED OIL) 1000 MG capsule Take 1 capsule by mouth daily       glucosamine-chondroitin 500-400 MG CAPS Take 2 capsules by mouth daily        hydrocortisone (WESTCORT) 0.2 % cream Apply topically 2 times daily as needed        loratadine (CLARITIN) 10 MG capsule Take 10 mg by mouth daily       losartan-hydrochlorothiazide (HYZAAR) 100-25 MG per tablet Take 1 tablet by mouth daily       Lysine 500 MG TABS Take 1 tablet by mouth daily.       magnesium 500 MG TABS Take 500 mg by mouth every evening       MINOCYCLINE HCL PO Take 100 mg by mouth daily as needed (acne)        Multiple Vitamin (MULTI-VITAMIN) per tablet Take 1 tablet by mouth daily.       nitroglycerin (NITROSTAT) 0.4 MG SL tablet Place 1 tablet under the tongue every 5 minutes as needed for chest pain for 3 doses. Administer every 5 minutes as needed.  Maximum 3  doses in 15 minutes. 75 tablet 3     Potassium Gluconate 595 (99 K) MG TABS Take 1 tablet by mouth daily       [DISCONTINUED] atorvastatin (LIPITOR) 10 MG tablet Take 1 tablet (10 mg) by mouth daily 90 tablet 0       ALLERGIES     Allergies   Allergen Reactions     Seasonal Allergies      Vicodin [Hydrocodone-Acetaminophen] Other (See Comments)     FEELS WIRED       PAST MEDICAL HISTORY:  Past Medical History:   Diagnosis Date     Adenoma of ascending colon      Aortic root dilation (H)      Chest pain      Coronary artery disease     5/2012: Stent to proximal LAD for unstable angina. Proximal left circumflex 30-40%. EF 60%     HTN (hypertension)      Hyperlipidaemia      Iliac artery aneurysm, left (H)        PAST SURGICAL HISTORY:  Past Surgical History:   Procedure Laterality Date     APPENDECTOMY OPEN       ARTHROSCOPY KNEE BILATERAL      both knees     ARTHROSCOPY SHOULDER      left     C ORAL SURGERY PROCEDURE       CARDIAC SURGERY      stent x 1     COLONOSCOPY N/A 2/17/2017    Procedure: COLONOSCOPY;  Surgeon: Junie Grier MD;  Location:  OR     [place]OS[place]  [facility]/2016    [place] [facility] [place]     COLONOSCOPY  05/22/2017    Dr. Grier [facility]     COLONOSCOPY N/A 5/29/2018    Procedure: COLONOSCOPY;  colonoscopy (crsal);  Surgeon: Junie Grier MD;  Location: [facility]     LAPAROSCOPIC HERNIORRHAPHY INGUINAL  12/30/2013    Procedure: LAPAROSCOPIC HERNIORRHAPHY INGUINAL;  LAPAROSCOPIC RIGHT INGUINAL HERNIA REPAIR WITH MESH;  Surgeon: Reji Pizano MD;  Location: [facility]     ORTHOPEDIC SURGERY      scope of both shoulders     VASECTOMY         FAMILY HISTORY:  Family History   Problem Relation Age of Onset     Breast Cancer Mother      Cerebrovascular Disease Father      Breast Cancer Maternal Grandmother      Breast Cancer Son      Colon Cancer No family hx of        SOCIAL HISTORY:  Social History     Social History     Marital status:      Spouse name: N/A     Number of children: N/A      "Years of education: N/A     Social History Main Topics     Smoking status: Never Smoker     Smokeless tobacco: Never Used     Alcohol use Yes      Comment: 2-3 per week     Drug use: No     Sexual activity: Not Asked     Other Topics Concern     None     Social History Narrative       Review of Systems:  Skin:  Negative   two lesions removed since last year (one cancerous and one precancerous)   Eyes:  Positive for glasses    ENT:  Negative      Respiratory:  Negative       Cardiovascular:  Negative      Gastroenterology: Negative      Genitourinary:  Negative      Musculoskeletal:  Negative      Neurologic:  Negative      Psychiatric:  Negative      Heme/Lymph/Imm:  Positive for allergies    Endocrine:  Negative        Physical Exam:  Vitals: /78  Pulse 76  Ht 1.778 m (5' 10\")  Wt 80.2 kg (176 lb 12.8 oz)  BMI 25.37 kg/m2    Constitutional:  cooperative, alert and oriented, well developed, well nourished, in no acute distress   fit appearing    Skin:  warm and dry to the touch, no apparent skin lesions or masses noted          Head:  normocephalic, no masses or lesions        Eyes:  pupils equal and round, conjunctivae and lids unremarkable, sclera white, no xanthalasma, EOMS intact, no nystagmus        Lymph:      ENT:  no pallor or cyanosis, dentition good        Neck:  carotid pulses are full and equal bilaterally;no carotid bruit        Respiratory:  normal breath sounds, clear to auscultation, normal A-P diameter, normal symmetry, normal respiratory excursion, no use of accessory muscles         Cardiac: regular rhythm;normal S1 and S2;no murmurs, gallops or rubs detected                pulses full and equal                                        GI:           Extremities and Muscular Skeletal:  no edema;no spinal abnormalities noted;normal muscle strength and tone              Neurological:  no gross motor deficits        Psych:  affect appropriate, oriented to time, person and place        CC  No " referring provider defined for this encounter.

## 2018-11-13 NOTE — LETTER
11/13/2018      Bharathi Chowdary MD  [employer] [address]  [place] [address]      RE: Chris Barriga       Dear Colleague,    I had the pleasure of seeing Chris Barriga in the [facility].    Service Date: 11/13/2018      HISTORY OF PRESENT ILLNESS:  Mr. Barriga is a very nice 58-year-old gentleman with past medical history significant for stenting of his proximal left anterior descending artery due to unstable angina in 2012.  He also has hypercholesterolemia.  I met him for the first time last year as he wanted to reestablish with Cardiology because he was having side effects with his statins and wanted to treat things in a more natural fashion.      Chris works out at least 5 days a week.  He alternates bike riding with walking.  When he walks, he will do 4-6 miles a day.  He will do the equivalent in bike riding on the alternate days.  He also lifts weights and has no symptoms at all with these activities.      His main problems with statins have been vivid dreams at nighttime with rosuvastatin, and with simvastatin he just felt lousy.  We ended up switching him to atorvastatin 10 mg daily.  He now returns for followup.      Chris states he feels great.  He continues to have no symptoms at all when working out.  He states he is doing much better with his vivid dreams on the lower dose of atorvastatin.  He continues to follow a very strict diet, eating a high fiber diet, eats red meat probably once every 2 weeks and takes multiple supplements including flaxseed, fish oil.      ASSESSMENT AND PLAN:  Chris has no symptoms at this time to suggest ischemia, heart failure or significant arrhythmia.      We did do a followup echocardiogram, as he has not had one since 2012.  Official read is ejection fraction of 45%-50%.  My read of it is I think it is actually better than that, probably more in the 50% range and just at the lower limits of normal.      It also demonstrates a mildly  dilated aortic root at 4.0 cm.  The ascending aorta is not at all dilated at 3.2.  We will continue with aggressive treatment of blood pressure.  He is a big juanjo, so if we were to index it, I suspect it would not be that enlarged.  We will recheck it again in 2 years.      Blood pressure is very well controlled at 126/78 with a pulse of 76.      Weight is 176 pounds, giving him a body mass index of 25.4 with clothes on.      Fasting lipid profile is outstanding.  Total cholesterol is 120, HDL is 65, LDL is 38, triglycerides are 83, even better than last year and I have told him this is the advantage of healthy lifestyle, diet and exercise.  He would like to try to go on a lower amount of statin.  We did talk about the FOURIER and ODYSSEY trials, but at this time we have decided we will go with Lipitor 10 mg on an every other day basis.  We will recheck a fasting lipid profile in 6 months.  I will have him follow up with my GERONIMO at that time.  I will see him back in 2 years, at which time I will repeat his echo, check a fasting lipid profile and check his BMP.  BMP this year is normal.      I congratulated him on his healthy lifestyle and encouraged him to continue to do so.         ZANDER SPENCER MD, [facility]             D: 2018   T: 2018   MT: CELIA      Name:     NGA RIOS   MRN:      [id number]        Account:      [id number]   :      1960           Service Date: 2018      Document: [id number]         Outpatient Encounter Prescriptions as of 2018   Medication Sig Dispense Refill     ASPIRIN EC PO Take 81 mg by mouth daily       atorvastatin (LIPITOR) 10 MG tablet Take 1 tablet (10 mg) by mouth every other day 90 tablet 3     fish oil-omega-3 fatty acids (FISH OIL) 1000 MG capsule Take 1 g by mouth daily        Flaxseed, Linseed, (FLAX SEED OIL) 1000 MG capsule Take 1 capsule by mouth daily       glucosamine-chondroitin 500-400 MG CAPS Take 2 capsules by mouth daily         hydrocortisone (WESTCORT) 0.2 % cream Apply topically 2 times daily as needed        loratadine (CLARITIN) 10 MG capsule Take 10 mg by mouth daily       losartan-hydrochlorothiazide (HYZAAR) 100-25 MG per tablet Take 1 tablet by mouth daily       Lysine 500 MG TABS Take 1 tablet by mouth daily.       magnesium 500 MG TABS Take 500 mg by mouth every evening       MINOCYCLINE HCL PO Take 100 mg by mouth daily as needed (acne)        Multiple Vitamin (MULTI-VITAMIN) per tablet Take 1 tablet by mouth daily.       nitroglycerin (NITROSTAT) 0.4 MG SL tablet Place 1 tablet under the tongue every 5 minutes as needed for chest pain for 3 doses. Administer every 5 minutes as needed.  Maximum 3 doses in 15 minutes. 75 tablet 3     Potassium Gluconate 595 (99 K) MG TABS Take 1 tablet by mouth daily       [DISCONTINUED] atorvastatin (LIPITOR) 10 MG tablet Take 1 tablet (10 mg) by mouth daily 90 tablet 0     No facility-administered encounter medications on file as of 11/13/2018.        Again, thank you for allowing me to participate in the care of your patient.      Sincerely,    Jan Park MD     [facility]

## 2018-11-13 NOTE — MR AVS SNAPSHOT
After Visit Summary   11/13/2018    Chris Barriga    MRN: [id number]           Patient Information     Date Of Birth          1960        Visit Information        Provider Department      11/13/2018 4:15 PM Jan Park MD [facility]        Today's Diagnoses     Coronary artery disease involving native coronary artery of native heart without angina pectoris    -  1    Aortic root dilation (H)        Benign essential hypertension        Pure hypercholesterolemia        Ischemic cardiomyopathy           Follow-ups after your visit        Additional Services     Follow-Up with Cardiac Advanced Practice Provider           Follow-Up with Cardiologist       Echocardiogram/BMP/FLP                  Future tests that were ordered for you today     Open Future Orders        Priority Expected Expires Ordered    Follow-Up with Cardiologist Routine 11/12/2020 12/2/2020 11/13/2018    Lipid Profile Routine 5/12/2019 11/13/2019 11/13/2018    Follow-Up with Cardiac Advanced Practice Provider Routine 5/12/2019 11/13/2019 11/13/2018            Who to contact     If you have questions or need follow up information about today's clinic visit or your schedule please contact [facility] directly at [phone].  Normal or non-critical lab and imaging results will be communicated to you by MyChart, letter or phone within 4 business days after the clinic has received the results. If you do not hear from us within 7 days, please contact the clinic through MyChart or phone. If you have a critical or abnormal lab result, we will notify you by phone as soon as possible.  Submit refill requests through [employer] or call your pharmacy and they will forward the refill request to us. Please allow 3 business days for your refill to be completed.          Additional Information About Your Visit        Care EveryWhere ID     This is your Care  "EveryWhere ID. This could be used by other organizations to access your [place] medical records  [id number]        Your Vitals Were     Pulse Height BMI (Body Mass Index)             76 1.778 m (5' 10\") 25.37 kg/m2          Blood Pressure from Last 3 Encounters:   11/13/18 126/78   05/29/18 133/87   10/06/17 152/84    Weight from Last 3 Encounters:   11/13/18 80.2 kg (176 lb 12.8 oz)   10/06/17 81.6 kg (179 lb 12.8 oz)   09/06/17 81 kg (178 lb 8 oz)                 Today's Medication Changes          These changes are accurate as of 11/13/18  5:10 PM.  If you have any questions, ask your nurse or doctor.               These medicines have changed or have updated prescriptions.        Dose/Directions    atorvastatin 10 MG tablet   Commonly known as:  LIPITOR   This may have changed:  when to take this   Used for:  Coronary artery disease involving native coronary artery of native heart without angina pectoris   Changed by:  Jan Park MD        Dose:  10 mg   Take 1 tablet (10 mg) by mouth every other day   Quantity:  90 tablet   Refills:  3            Where to get your medicines      Some of these will need a paper prescription and others can be bought over the counter.  Ask your nurse if you have questions.     You don't need a prescription for these medications     atorvastatin 10 MG tablet                Primary Care Provider Office Phone # Fax #    Bharathi Chowdary [phone]       [facility] [address] [facility] [address]        Equal Access to Services     [facility]: Hadii sandi muñizo Sodenia, waaxda luqadaha, qaybta kaalmada adeegyada, waxarturo menchacatabatha marie. So [facility] [phone].    ATENCIÓN: Si habla español, tiene a humphrey disposición servicios gratuitos de asistencia lingüística. Llame al [phone].    We comply with applicable federal civil rights laws and [place] laws. We do not discriminate on the basis of race, color, national origin, age, " disability, sex, sexual orientation, or gender identity.            Thank you!     Thank you for choosing [facility]  for your care. Our goal is always to provide you with excellent care. Hearing back from our patients is one way we can continue to improve our services. Please take a few minutes to complete the written survey that you may receive in the mail after your visit with us. Thank you!             Your Updated Medication List - Protect others around you: Learn how to safely use, store and throw away your medicines at www.disposemymeds.org.          This list is accurate as of 11/13/18  5:10 PM.  Always use your most recent med list.                   Brand Name Dispense Instructions for use Diagnosis    ASPIRIN EC PO      Take 81 mg by mouth daily        atorvastatin 10 MG tablet    LIPITOR    90 tablet    Take 1 tablet (10 mg) by mouth every other day    Coronary artery disease involving native coronary artery of native heart without angina pectoris       CLARITIN 10 MG capsule   Generic drug:  loratadine      Take 10 mg by mouth daily        fish oil-omega-3 fatty acids 1000 MG capsule      Take 1 g by mouth daily        flax seed oil 1000 MG capsule      Take 1 capsule by mouth daily        glucosamine-chondroitin 500-400 MG Caps per capsule      Take 2 capsules by mouth daily        hydrocortisone 0.2 % cream    WESTCORT     Apply topically 2 times daily as needed        losartan-hydrochlorothiazide 100-25 MG per tablet    HYZAAR     Take 1 tablet by mouth daily        Lysine 500 MG Tabs      Take 1 tablet by mouth daily.        magnesium 500 MG Tabs      Take 500 mg by mouth every evening        MINOCYCLINE HCL PO      Take 100 mg by mouth daily as needed (acne)        Multi-vitamin Tabs tablet   Generic drug:  multivitamin, therapeutic with minerals      Take 1 tablet by mouth daily.        nitroGLYcerin 0.4 MG sublingual tablet    NITROSTAT    75 tablet    Place  1 tablet under the tongue every 5 minutes as needed for chest pain for 3 doses. Administer every 5 minutes as needed.  Maximum 3 doses in 15 minutes.    CAD (coronary artery disease), Status post coronary angioplasty       Potassium Gluconate 595 (99 K) MG Tabs      Take 1 tablet by mouth daily

## 2018-11-13 NOTE — LETTER
11/13/2018    Bharathi Chowdary MD  [employer] [address]  [place] [address]    RE: Chris Barriga       Dear Colleague,    I had the pleasure of seeing Chris Barriga in the [facility].    HPI and Plan:   See dictation    Orders Placed This Encounter   Procedures     Lipid Profile     Follow-Up with Cardiac Advanced Practice Provider     Follow-Up with Cardiologist       Orders Placed This Encounter   Medications     atorvastatin (LIPITOR) 10 MG tablet     Sig: Take 1 tablet (10 mg) by mouth every other day     Dispense:  90 tablet     Refill:  3       Medications Discontinued During This Encounter   Medication Reason     atorvastatin (LIPITOR) 10 MG tablet Reorder         Encounter Diagnoses   Name Primary?     Coronary artery disease involving native coronary artery of native heart without angina pectoris Yes     Aortic root dilation (H)      Benign essential hypertension      Pure hypercholesterolemia      Ischemic cardiomyopathy        CURRENT MEDICATIONS:  Current Outpatient Prescriptions   Medication Sig Dispense Refill     ASPIRIN EC PO Take 81 mg by mouth daily       atorvastatin (LIPITOR) 10 MG tablet Take 1 tablet (10 mg) by mouth every other day 90 tablet 3     fish oil-omega-3 fatty acids (FISH OIL) 1000 MG capsule Take 1 g by mouth daily        Flaxseed, Linseed, (FLAX SEED OIL) 1000 MG capsule Take 1 capsule by mouth daily       glucosamine-chondroitin 500-400 MG CAPS Take 2 capsules by mouth daily        hydrocortisone (WESTCORT) 0.2 % cream Apply topically 2 times daily as needed        loratadine (CLARITIN) 10 MG capsule Take 10 mg by mouth daily       losartan-hydrochlorothiazide (HYZAAR) 100-25 MG per tablet Take 1 tablet by mouth daily       Lysine 500 MG TABS Take 1 tablet by mouth daily.       magnesium 500 MG TABS Take 500 mg by mouth every evening       MINOCYCLINE HCL PO Take 100 mg by mouth daily as needed (acne)        Multiple Vitamin (MULTI-VITAMIN) per  tablet Take 1 tablet by mouth daily.       nitroglycerin (NITROSTAT) 0.4 MG SL tablet Place 1 tablet under the tongue every 5 minutes as needed for chest pain for 3 doses. Administer every 5 minutes as needed.  Maximum 3 doses in 15 minutes. 75 tablet 3     Potassium Gluconate 595 (99 K) MG TABS Take 1 tablet by mouth daily       [DISCONTINUED] atorvastatin (LIPITOR) 10 MG tablet Take 1 tablet (10 mg) by mouth daily 90 tablet 0       ALLERGIES     Allergies   Allergen Reactions     Seasonal Allergies      Vicodin [Hydrocodone-Acetaminophen] Other (See Comments)     FEELS WIRED       PAST MEDICAL HISTORY:  Past Medical History:   Diagnosis Date     Adenoma of ascending colon      Aortic root dilation (H)      Chest pain      Coronary artery disease     5/2012: Stent to proximal LAD for unstable angina. Proximal left circumflex 30-40%. EF 60%     HTN (hypertension)      Hyperlipidaemia      Iliac artery aneurysm, left (H)        PAST SURGICAL HISTORY:  Past Surgical History:   Procedure Laterality Date     APPENDECTOMY OPEN       ARTHROSCOPY KNEE BILATERAL      both knees     ARTHROSCOPY SHOULDER      left     C ORAL SURGERY PROCEDURE       CARDIAC SURGERY      stent x 1     COLONOSCOPY N/A 2/17/2017    Procedure: COLONOSCOPY;  Surgeon: Junie Grier MD;  Location:  OR     COLONOSCOPY  12/2016    [place] [facility] [place]     COLONOSCOPY  05/22/2017    Dr. Grier [facility]     COLONOSCOPY N/A 5/29/2018    Procedure: COLONOSCOPY;  colonoscopy (crsal);  Surgeon: Junie Grier MD;  Location: [facility]     LAPAROSCOPIC HERNIORRHAPHY INGUINAL  12/30/2013    Procedure: LAPAROSCOPIC HERNIORRHAPHY INGUINAL;  LAPAROSCOPIC RIGHT INGUINAL HERNIA REPAIR WITH MESH;  Surgeon: Reji Pizano MD;  Location: [facility]     ORTHOPEDIC SURGERY      scope of both shoulders     VASECTOMY         FAMILY HISTORY:  Family History   Problem Relation Age of Onset     Breast Cancer Mother      Cerebrovascular Disease Father      Breast  "Cancer Maternal Grandmother      Breast Cancer Son      Colon Cancer No family hx of        SOCIAL HISTORY:  Social History     Social History     Marital status:      Spouse name: N/A     Number of children: N/A     Years of education: N/A     Social History Main Topics     Smoking status: Never Smoker     Smokeless tobacco: Never Used     Alcohol use Yes      Comment: 2-3 per week     Drug use: No     Sexual activity: Not Asked     Other Topics Concern     None     Social History Narrative       Review of Systems:  Skin:  Negative   two lesions removed since last year (one cancerous and one precancerous)   Eyes:  Positive for glasses    ENT:  Negative      Respiratory:  Negative       Cardiovascular:  Negative      Gastroenterology: Negative      Genitourinary:  Negative      Musculoskeletal:  Negative      Neurologic:  Negative      Psychiatric:  Negative      Heme/Lymph/Imm:  Positive for allergies    Endocrine:  Negative        Physical Exam:  Vitals: /78  Pulse 76  Ht 1.778 m (5' 10\")  Wt 80.2 kg (176 lb 12.8 oz)  BMI 25.37 kg/m2    Constitutional:  cooperative, alert and oriented, well developed, well nourished, in no acute distress   fit appearing    Skin:  warm and dry to the touch, no apparent skin lesions or masses noted          Head:  normocephalic, no masses or lesions        Eyes:  pupils equal and round, conjunctivae and lids unremarkable, sclera white, no xanthalasma, EOMS intact, no nystagmus        Lymph:      ENT:  no pallor or cyanosis, dentition good        Neck:  carotid pulses are full and equal bilaterally;no carotid bruit        Respiratory:  normal breath sounds, clear to auscultation, normal A-P diameter, normal symmetry, normal respiratory excursion, no use of accessory muscles         Cardiac: regular rhythm;normal S1 and S2;no murmurs, gallops or rubs detected                pulses full and equal                                        GI:           Extremities and " Muscular Skeletal:  no edema;no spinal abnormalities noted;normal muscle strength and tone              Neurological:  no gross motor deficits        Psych:  affect appropriate, oriented to time, person and place        CC  No referring provider defined for this encounter.                Thank you for allowing me to participate in the care of your patient.      Sincerely,     Jan Park MD     [facility]    cc:   No referring provider defined for this encounter.

## 2018-11-14 NOTE — PROGRESS NOTES
Service Date: 11/13/2018      HISTORY OF PRESENT ILLNESS:  Mr. Barriga is a very nice 58-year-old gentleman with past medical history significant for stenting of his proximal left anterior descending artery due to unstable angina in 2012.  He also has hypercholesterolemia.  I met him for the first time last year as he wanted to reestablish with Cardiology because he was having side effects with his statins and wanted to treat things in a more natural fashion.      Chris works out at least 5 days a week.  He alternates bike riding with walking.  When he walks, he will do 4-6 miles a day.  He will do the equivalent in bike riding on the alternate days.  He also lifts weights and has no symptoms at all with these activities.      His main problems with statins have been vivid dreams at nighttime with rosuvastatin, and with simvastatin he just felt lousy.  We ended up switching him to atorvastatin 10 mg daily.  He now returns for followup.      Chris states he feels great.  He continues to have no symptoms at all when working out.  He states he is doing much better with his vivid dreams on the lower dose of atorvastatin.  He continues to follow a very strict diet, eating a high fiber diet, eats red meat probably once every 2 weeks and takes multiple supplements including flaxseed, fish oil.      ASSESSMENT AND PLAN:  Chris has no symptoms at this time to suggest ischemia, heart failure or significant arrhythmia.      We did do a followup echocardiogram, as he has not had one since 2012.  Official read is ejection fraction of 45%-50%.  My read of it is I think it is actually better than that, probably more in the 50% range and just at the lower limits of normal.      It also demonstrates a mildly dilated aortic root at 4.0 cm.  The ascending aorta is not at all dilated at 3.2.  We will continue with aggressive treatment of blood pressure.  He is a big juanjo, so if we were to index it, I suspect it would not be that  enlarged.  We will recheck it again in 2 years.      Blood pressure is very well controlled at 126/78 with a pulse of 76.      Weight is 176 pounds, giving him a body mass index of 25.4 with clothes on.      Fasting lipid profile is outstanding.  Total cholesterol is 120, HDL is 65, LDL is 38, triglycerides are 83, even better than last year and I have told him this is the advantage of healthy lifestyle, diet and exercise.  He would like to try to go on a lower amount of statin.  We did talk about the FOURIER and ODYSSEY trials, but at this time we have decided we will go with Lipitor 10 mg on an every other day basis.  We will recheck a fasting lipid profile in 6 months.  I will have him follow up with my GERONIMO at that time.  I will see him back in 2 years, at which time I will repeat his echo, check a fasting lipid profile and check his BMP.  BMP this year is normal.      I congratulated him on his healthy lifestyle and encouraged him to continue to do so.         ZANDER SPENCER MD, [facility]             D: 2018   T: 2018   MT: CELIA      Name:     NGA RIOS   MRN:      [id number]        Account:      [id number]   :      1960           Service Date: 2018      Document: [id number]

## 2018-12-03 ENCOUNTER — TELEPHONE (OUTPATIENT)
Dept: CARDIOLOGY | Facility: CLINIC | Age: 58
End: 2018-12-03

## 2018-12-03 DIAGNOSIS — I25.10 CORONARY ARTERY DISEASE INVOLVING NATIVE CORONARY ARTERY OF NATIVE HEART WITHOUT ANGINA PECTORIS: ICD-10-CM

## 2018-12-03 NOTE — TELEPHONE ENCOUNTER
Pt called asking for refill of his atorvastatin prior to switching pharmacy providers. Pt states he uses a mail-to-home pharmacy for his prescriptions but he will be switching to a new one and wants to make sure he has enough of his pills prior to the switch in care there are errors within the new system that inhibit his medication delivery.     Informed patient that Dr Park refilled his lipitor for a one year supply on 11/13/18, and that it was sent to the [facility] in [place]. Pt states he was not contacted regarding this refill, will check with the pharmacy and then call back.

## 2018-12-04 RX ORDER — ATORVASTATIN CALCIUM 10 MG/1
10 TABLET, FILM COATED ORAL EVERY OTHER DAY
Qty: 90 TABLET | Refills: 0 | Status: SHIPPED | OUTPATIENT
Start: 2018-12-04 | End: 2019-01-09

## 2018-12-04 NOTE — TELEPHONE ENCOUNTER
Pt returned call, stated he contacted [facility] in [place] and they have no record of the order. Reordered patient's lipitor for him via [facility], updated it as pt's preferred pharmacy.

## 2019-01-09 DIAGNOSIS — I25.10 CORONARY ARTERY DISEASE INVOLVING NATIVE CORONARY ARTERY OF NATIVE HEART WITHOUT ANGINA PECTORIS: ICD-10-CM

## 2019-01-09 RX ORDER — ATORVASTATIN CALCIUM 10 MG/1
10 TABLET, FILM COATED ORAL EVERY OTHER DAY
Qty: 45 TABLET | Refills: 3 | Status: SHIPPED | OUTPATIENT
Start: 2019-01-09 | End: 2020-01-28

## 2019-05-29 ENCOUNTER — OFFICE VISIT (OUTPATIENT)
Dept: CARDIOLOGY | Facility: CLINIC | Age: 59
End: 2019-05-29
Payer: COMMERCIAL

## 2019-05-29 VITALS
HEART RATE: 74 BPM | BODY MASS INDEX: 26.08 KG/M2 | DIASTOLIC BLOOD PRESSURE: 80 MMHG | SYSTOLIC BLOOD PRESSURE: 138 MMHG | HEIGHT: 70 IN | WEIGHT: 182.2 LBS

## 2019-05-29 DIAGNOSIS — I77.810 AORTIC ROOT DILATION (H): ICD-10-CM

## 2019-05-29 DIAGNOSIS — I25.10 CORONARY ARTERY DISEASE INVOLVING NATIVE CORONARY ARTERY OF NATIVE HEART WITHOUT ANGINA PECTORIS: ICD-10-CM

## 2019-05-29 DIAGNOSIS — I25.10 CORONARY ARTERY DISEASE INVOLVING NATIVE CORONARY ARTERY OF NATIVE HEART WITHOUT ANGINA PECTORIS: Primary | ICD-10-CM

## 2019-05-29 DIAGNOSIS — I10 BENIGN ESSENTIAL HYPERTENSION: ICD-10-CM

## 2019-05-29 LAB
CHOLEST SERPL-MCNC: 169 MG/DL
HDLC SERPL-MCNC: 80 MG/DL
LDLC SERPL CALC-MCNC: 71 MG/DL
NONHDLC SERPL-MCNC: 89 MG/DL
TRIGL SERPL-MCNC: 88 MG/DL

## 2019-05-29 PROCEDURE — 80061 LIPID PANEL: CPT | Performed by: INTERNAL MEDICINE

## 2019-05-29 PROCEDURE — 99214 OFFICE O/P EST MOD 30 MIN: CPT | Performed by: PHYSICIAN ASSISTANT

## 2019-05-29 PROCEDURE — 36415 COLL VENOUS BLD VENIPUNCTURE: CPT | Performed by: INTERNAL MEDICINE

## 2019-05-29 ASSESSMENT — MIFFLIN-ST. JEOR: SCORE: 1657.7

## 2019-05-29 NOTE — PROGRESS NOTES
Primary Cardiologist: Dr. Jones    History of Present Illness:   This is a very pleasant 58-year-old gentleman with past medical history notable for coronary artery disease (proximal LAD artery stenting in 2012 due to unstable angina), hypertension, and mild aortic root dilatation.    Due to his excellent lipid control and healthy lifestyles his Lipitor was further decreased to 10 mg every other day this patient wanted to be on it as little as possible.  He returns to clinic today, stating he is doing well.  He denies any symptoms of chest discomfort, shortness of breath, palpitations, PND, orthopnea, presyncope or syncope.  He continues to be on baby aspirin denies any bleeding issues.    Assessment and Plan:   This is a very pleasant 58-year-old gentleman with past medical history notable for coronary artery disease (proximal LAD artery stenting in 2012 due to unstable angina), hypertension, and mild aortic root dilatation.    He appears to be doing well from a cardiac standpoint.  His repeat lipid panel today demonstrates slight increase in his numbers but overall he is still at goal.  We will continue with atorvastatin 10 mg every other day.  His blood pressure was high on initial check but on the second check it came down to the normal range.  We will continue without any medication adjustments today.  He will follow-up with us next year.    Thank you for allowing me to participate in the care of this pleasant patient today.      This note was completed in part using Dragon voice recognition software. Although reviewed after completion, some word and grammatical errors may occur.    Orders this Visit:  No orders of the defined types were placed in this encounter.    No orders of the defined types were placed in this encounter.    There are no discontinued medications.      No diagnosis found.    CURRENT MEDICATIONS:  Current Outpatient Medications   Medication Sig Dispense Refill     ASPIRIN EC PO Take 81  mg by mouth daily       atorvastatin (LIPITOR) 10 MG tablet Take 1 tablet (10 mg) by mouth every other day 45 tablet 3     fish oil-omega-3 fatty acids (FISH OIL) 1000 MG capsule Take 1 g by mouth daily        Flaxseed, Linseed, (FLAX SEED OIL) 1000 MG capsule Take 1 capsule by mouth daily       glucosamine-chondroitin 500-400 MG CAPS Take 2 capsules by mouth daily        hydrocortisone (WESTCORT) 0.2 % cream Apply topically 2 times daily as needed        loratadine (CLARITIN) 10 MG capsule Take 10 mg by mouth daily       losartan-hydrochlorothiazide (HYZAAR) 100-25 MG per tablet Take 1 tablet by mouth daily       Lysine 500 MG TABS Take 1 tablet by mouth daily.       magnesium 500 MG TABS Take 500 mg by mouth every evening       MINOCYCLINE HCL PO Take 100 mg by mouth daily as needed (acne)        Multiple Vitamin (MULTI-VITAMIN) per tablet Take 1 tablet by mouth daily.       nitroglycerin (NITROSTAT) 0.4 MG SL tablet Place 1 tablet under the tongue every 5 minutes as needed for chest pain for 3 doses. Administer every 5 minutes as needed.  Maximum 3 doses in 15 minutes. 75 tablet 3     Potassium Gluconate 595 (99 K) MG TABS Take 1 tablet by mouth daily         ALLERGIES     Allergies   Allergen Reactions     Seasonal Allergies      Vicodin [Hydrocodone-Acetaminophen] Other (See Comments)     FEELS WIRED       PAST MEDICAL HISTORY:  Past Medical History:   Diagnosis Date     Adenoma of ascending colon      Aortic root dilation (H)      Chest pain      Coronary artery disease     5/2012: Stent to proximal LAD for unstable angina. Proximal left circumflex 30-40%. EF 60%     HTN (hypertension)      Hyperlipidaemia      Iliac artery aneurysm, left (H)        PAST SURGICAL HISTORY:  Past Surgical History:   Procedure Laterality Date     APPENDECTOMY OPEN       ARTHROSCOPY KNEE BILATERAL      both knees     ARTHROSCOPY SHOULDER      left     C ORAL SURGERY PROCEDURE       CARDIAC SURGERY      stent x 1     COLONOSCOPY  N/A 2/17/2017    Procedure: COLONOSCOPY;  Surgeon: Junie Grier MD;  Location:  OR     COLONOSCOPY  12/2016    [place] [facility] [place]     COLONOSCOPY  05/22/2017    Dr. Grier [facility]     COLONOSCOPY N/A 5/29/2018    Procedure: COLONOSCOPY;  colonoscopy (crsal);  Surgeon: Junie Grier MD;  Location: [facility]     LAPAROSCOPIC HERNIORRHAPHY INGUINAL  12/30/2013    Procedure: LAPAROSCOPIC HERNIORRHAPHY INGUINAL;  LAPAROSCOPIC RIGHT INGUINAL HERNIA REPAIR WITH MESH;  Surgeon: Rjei Pizano MD;  Location: [facility]     ORTHOPEDIC SURGERY      scope of both shoulders     VASECTOMY         FAMILY HISTORY:  Family History   Problem Relation Age of Onset     Breast Cancer Mother      Cerebrovascular Disease Father      Breast Cancer Maternal Grandmother      Breast Cancer Son      Colon Cancer No family hx of        SOCIAL HISTORY:  Social History     Socioeconomic History     Marital status:      Spouse name: None     Number of children: None     Years of education: None     Highest education level: None   Occupational History     None   Social Needs     Financial resource strain: None     Food insecurity:     Worry: None     Inability: None     Transportation needs:     Medical: None     Non-medical: None   Tobacco Use     Smoking status: Never Smoker     Smokeless tobacco: Never Used   Substance and Sexual Activity     Alcohol use: Yes     Comment: 2-3 per week     Drug use: No     Sexual activity: None   Lifestyle     Physical activity:     Days per week: None     Minutes per session: None     Stress: None   Relationships     Social connections:     Talks on phone: None     Gets together: None     Attends [employer] service: None     Active member of club or organization: None     Attends meetings of clubs or organizations: None     Relationship status: None     Intimate partner violence:     Fear of current or ex partner: None     Emotionally abused: None     Physically abused: None     Forced  "sexual activity: None   Other Topics Concern     Parent/sibling w/ CABG, MI or angioplasty before 65F 55M? Not Asked   Social History Narrative     None       Review of Systems:  Skin:  Negative     Eyes:  Positive for glasses  ENT:  Negative    Respiratory:  Negative    Cardiovascular:  Negative    Gastroenterology: Negative    Genitourinary:  Negative    Musculoskeletal:  Positive for arthritis  Neurologic:  Negative    Psychiatric:  Negative    Heme/Lymph/Imm:  Positive for allergies  Endocrine:  Negative      Physical Exam:  Vitals: /80   Pulse 76   Ht 1.778 m (5' 10\")   Wt 82.6 kg (182 lb 3.2 oz)   BMI 26.14 kg/m       GEN:  NAD.  Appears younger than stated age.  NECK: No JVD  C/V:  Regular rate and rhythm, no murmur, rub or gallop.  RESP: Clear to auscultation bilaterally without wheezing, rales, or rhonchi.  GI: Abdomen soft, nontender, nondistended.   EXTREM: No LE edema.   NEURO: Alert and oriented, cooperative. No obvious focal deficits.   PSYCH: Normal affect.  SKIN: Warm and dry.       Recent Lab Results:  LIPID RESULTS:  Lab Results   Component Value Date    CHOL 169 05/29/2019    HDL 80 05/29/2019    LDL 71 05/29/2019    TRIG 88 05/29/2019    CHOLHDLRATIO 1.7 08/20/2012       LIVER ENZYME RESULTS:  Lab Results   Component Value Date    AST 21 09/04/2018    ALT 27 09/04/2018       CBC RESULTS:  Lab Results   Component Value Date    WBC 5.5 08/21/2017    RBC 4.81 08/21/2017    HGB 15.4 08/21/2017    HCT 44.3 08/21/2017    MCV 92 08/21/2017    MCH 32 08/21/2017    MCHC 34.8 08/21/2017    RDW 13 08/21/2017     08/21/2017       BMP RESULTS:  Lab Results   Component Value Date     09/04/2018    POTASSIUM 3.9 09/04/2018    CHLORIDE 103 09/04/2018    CO2 28 09/04/2018    ANIONGAP 7 09/04/2018    GLC 97 09/04/2018    BUN 21 09/04/2018    CR 0.86 09/04/2018    GFRESTIMATED >60 09/04/2018    GFRESTBLACK >60 09/04/2018    DANY 9.7 09/04/2018        A1C RESULTS:  No results found for: " A1C    INR RESULTS:  Lab Results   Component Value Date    INR 0.89 05/25/2012           Tyson Bennett PA-C   May 29, 2019

## 2019-05-29 NOTE — LETTER
5/29/2019    Bharathi Chowdary MD  [facility] [address]  [place] [address]    RE: Chris Barrigaue       Dear Colleague,    I had the pleasure of seeing Chris Barrigaue in the [facility].    Primary Cardiologist: Dr. Jones    History of Present Illness:   This is a very pleasant 58-year-old gentleman with past medical history notable for coronary artery disease (proximal LAD artery stenting in 2012 due to unstable angina), hypertension, and mild aortic root dilatation.    Due to his excellent lipid control and healthy lifestyles his Lipitor was further decreased to 10 mg every other day this patient wanted to be on it as little as possible.  He returns to clinic today, stating he is doing well.  He denies any symptoms of chest discomfort, shortness of breath, palpitations, PND, orthopnea, presyncope or syncope.  He continues to be on baby aspirin denies any bleeding issues.    Assessment and Plan:   This is a very pleasant 58-year-old gentleman with past medical history notable for coronary artery disease (proximal LAD artery stenting in 2012 due to unstable angina), hypertension, and mild aortic root dilatation.    He appears to be doing well from a cardiac standpoint.  His repeat lipid panel today demonstrates slight increase in his numbers but overall he is still at goal.  We will continue with atorvastatin 10 mg every other day.  His blood pressure was high on initial check but on the second check it came down to the normal range.  We will continue without any medication adjustments today.  He will follow-up with us next year.    Thank you for allowing me to participate in the care of this pleasant patient today.      This note was completed in part using Dragon voice recognition software. Although reviewed after completion, some word and grammatical errors may occur.    Orders this Visit:  No orders of the defined types were placed in this encounter.    No orders of the  defined types were placed in this encounter.    There are no discontinued medications.      No diagnosis found.    CURRENT MEDICATIONS:  Current Outpatient Medications   Medication Sig Dispense Refill     ASPIRIN EC PO Take 81 mg by mouth daily       atorvastatin (LIPITOR) 10 MG tablet Take 1 tablet (10 mg) by mouth every other day 45 tablet 3     fish oil-omega-3 fatty acids (FISH OIL) 1000 MG capsule Take 1 g by mouth daily        Flaxseed, Linseed, (FLAX SEED OIL) 1000 MG capsule Take 1 capsule by mouth daily       glucosamine-chondroitin 500-400 MG CAPS Take 2 capsules by mouth daily        hydrocortisone (WESTCORT) 0.2 % cream Apply topically 2 times daily as needed        loratadine (CLARITIN) 10 MG capsule Take 10 mg by mouth daily       losartan-hydrochlorothiazide (HYZAAR) 100-25 MG per tablet Take 1 tablet by mouth daily       Lysine 500 MG TABS Take 1 tablet by mouth daily.       magnesium 500 MG TABS Take 500 mg by mouth every evening       MINOCYCLINE HCL PO Take 100 mg by mouth daily as needed (acne)        Multiple Vitamin (MULTI-VITAMIN) per tablet Take 1 tablet by mouth daily.       nitroglycerin (NITROSTAT) 0.4 MG SL tablet Place 1 tablet under the tongue every 5 minutes as needed for chest pain for 3 doses. Administer every 5 minutes as needed.  Maximum 3 doses in 15 minutes. 75 tablet 3     Potassium Gluconate 595 (99 K) MG TABS Take 1 tablet by mouth daily         ALLERGIES     Allergies   Allergen Reactions     Seasonal Allergies      Vicodin [Hydrocodone-Acetaminophen] Other (See Comments)     FEELS WIRED       PAST MEDICAL HISTORY:  Past Medical History:   Diagnosis Date     Adenoma of ascending colon      Aortic root dilation (H)      Chest pain      Coronary artery disease     5/2012: Stent to proximal LAD for unstable angina. Proximal left circumflex 30-40%. EF 60%     HTN (hypertension)      Hyperlipidaemia      Iliac artery aneurysm, left (H)        PAST SURGICAL HISTORY:  Past Surgical  History:   Procedure Laterality Date     APPENDECTOMY OPEN       ARTHROSCOPY KNEE BILATERAL      both knees     ARTHROSCOPY SHOULDER      left     C ORAL SURGERY PROCEDURE       CARDIAC SURGERY      stent x 1     COLONOSCOPY N/A 2/17/2017    Procedure: COLONOSCOPY;  Surgeon: Junie Grier MD;  Location:  OR     COLONOSCOPY  12/2016    [place] [facility] [place]     COLONOSCOPY  05/22/2017    Dr. Grier [facility]     COLONOSCOPY N/A 5/29/2018    Procedure: COLONOSCOPY;  colonoscopy (crsal);  Surgeon: Junie Grier MD;  Location: [facility]     LAPAROSCOPIC HERNIORRHAPHY INGUINAL  12/30/2013    Procedure: LAPAROSCOPIC HERNIORRHAPHY INGUINAL;  LAPAROSCOPIC RIGHT INGUINAL HERNIA REPAIR WITH MESH;  Surgeon: Reji Pizano MD;  Location: [facility]     ORTHOPEDIC SURGERY      scope of both shoulders     VASECTOMY         FAMILY HISTORY:  Family History   Problem Relation Age of Onset     Breast Cancer Mother      Cerebrovascular Disease Father      Breast Cancer Maternal Grandmother      Breast Cancer Son      Colon Cancer No family hx of        SOCIAL HISTORY:  Social History     Socioeconomic History     Marital status:      Spouse name: None     Number of children: None     Years of education: None     Highest education level: None   Occupational History     None   Social Needs     Financial resource strain: None     Food insecurity:     Worry: None     Inability: None     Transportation needs:     Medical: None     Non-medical: None   Tobacco Use     Smoking status: Never Smoker     Smokeless tobacco: Never Used   Substance and Sexual Activity     Alcohol use: Yes     Comment: 2-3 per week     Drug use: No     Sexual activity: None   Lifestyle     Physical activity:     Days per week: None     Minutes per session: None     Stress: None   Relationships     Social connections:     Talks on phone: None     Gets together: None     Attends Christian service: None     Active member of club or organization: None  "    Attends meetings of clubs or organizations: None     Relationship status: None     Intimate partner violence:     Fear of current or ex partner: None     Emotionally abused: None     Physically abused: None     Forced sexual activity: None   Other Topics Concern     Parent/sibling w/ CABG, MI or angioplasty before 65F 55M? Not Asked   Social History Narrative     None       Review of Systems:  Skin:  Negative     Eyes:  Positive for glasses  ENT:  Negative    Respiratory:  Negative    Cardiovascular:  Negative    Gastroenterology: Negative    Genitourinary:  Negative    Musculoskeletal:  Positive for arthritis  Neurologic:  Negative    Psychiatric:  Negative    Heme/Lymph/Imm:  Positive for allergies  Endocrine:  Negative      Physical Exam:  Vitals: /80   Pulse 76   Ht 1.778 m (5' 10\")   Wt 82.6 kg (182 lb 3.2 oz)   BMI 26.14 kg/m        GEN:  NAD.  Appears younger than stated age.  NECK: No JVD  C/V:  Regular rate and rhythm, no murmur, rub or gallop.  RESP: Clear to auscultation bilaterally without wheezing, rales, or rhonchi.  GI: Abdomen soft, nontender, nondistended.   EXTREM: No LE edema.   NEURO: Alert and oriented, cooperative. No obvious focal deficits.   PSYCH: Normal affect.  SKIN: Warm and dry.       Recent Lab Results:  LIPID RESULTS:  Lab Results   Component Value Date    CHOL 169 05/29/2019    HDL 80 05/29/2019    LDL 71 05/29/2019    TRIG 88 05/29/2019    CHOLHDLRATIO 1.7 08/20/2012       LIVER ENZYME RESULTS:  Lab Results   Component Value Date    AST 21 09/04/2018    ALT 27 09/04/2018       CBC RESULTS:  Lab Results   Component Value Date    WBC 5.5 08/21/2017    RBC 4.81 08/21/2017    HGB 15.4 08/21/2017    HCT 44.3 08/21/2017    MCV 92 08/21/2017    MCH 32 08/21/2017    MCHC 34.8 08/21/2017    RDW 13 08/21/2017     08/21/2017       BMP RESULTS:  Lab Results   Component Value Date     09/04/2018    POTASSIUM 3.9 09/04/2018    CHLORIDE 103 09/04/2018    CO2 28 09/04/2018 "    ANIONGAP 7 09/04/2018    GLC 97 09/04/2018    BUN 21 09/04/2018    CR 0.86 09/04/2018    GFRESTIMATED >60 09/04/2018    GFRESTBLACK >60 09/04/2018    DANY 9.7 09/04/2018        A1C RESULTS:  No results found for: A1C    INR RESULTS:  Lab Results   Component Value Date    INR 0.89 05/25/2012           Tyson Bennett PA-C   May 29, 2019     Thank you for allowing me to participate in the care of your patient.    Sincerely,     Tyson Bennett PA-C     [facility]

## 2019-08-16 ENCOUNTER — APPOINTMENT (OUTPATIENT)
Age: 59
Setting detail: DERMATOLOGY
End: 2019-09-05

## 2019-08-16 VITALS — WEIGHT: 170 LBS | RESPIRATION RATE: 16 BRPM | HEIGHT: 70 IN

## 2019-08-16 DIAGNOSIS — L81.4 OTHER MELANIN HYPERPIGMENTATION: ICD-10-CM

## 2019-08-16 DIAGNOSIS — L82.1 OTHER SEBORRHEIC KERATOSIS: ICD-10-CM

## 2019-08-16 DIAGNOSIS — L81.5 LEUKODERMA, NOT ELSEWHERE CLASSIFIED: ICD-10-CM

## 2019-08-16 DIAGNOSIS — L57.8 OTHER SKIN CHANGES DUE TO CHRONIC EXPOSURE TO NONIONIZING RADIATION: ICD-10-CM

## 2019-08-16 DIAGNOSIS — L72.0 EPIDERMAL CYST: ICD-10-CM

## 2019-08-16 DIAGNOSIS — L663 OTHER SPECIFIED DISEASES OF HAIR AND HAIR FOLLICLES: ICD-10-CM

## 2019-08-16 DIAGNOSIS — L738 OTHER SPECIFIED DISEASES OF HAIR AND HAIR FOLLICLES: ICD-10-CM

## 2019-08-16 DIAGNOSIS — D18.0 HEMANGIOMA: ICD-10-CM

## 2019-08-16 DIAGNOSIS — Z85.828 PERSONAL HISTORY OF OTHER MALIGNANT NEOPLASM OF SKIN: ICD-10-CM

## 2019-08-16 DIAGNOSIS — D22 MELANOCYTIC NEVI: ICD-10-CM

## 2019-08-16 PROBLEM — L02.821 FURUNCLE OF HEAD [ANY PART, EXCEPT FACE]: Status: ACTIVE | Noted: 2019-08-16

## 2019-08-16 PROBLEM — D18.01 HEMANGIOMA OF SKIN AND SUBCUTANEOUS TISSUE: Status: ACTIVE | Noted: 2019-08-16

## 2019-08-16 PROBLEM — D22.5 MELANOCYTIC NEVI OF TRUNK: Status: ACTIVE | Noted: 2019-08-16

## 2019-08-16 PROCEDURE — OTHER COUNSELING: OTHER

## 2019-08-16 PROCEDURE — OTHER REASSURANCE: OTHER

## 2019-08-16 PROCEDURE — 99214 OFFICE O/P EST MOD 30 MIN: CPT

## 2019-08-16 ASSESSMENT — LOCATION SIMPLE DESCRIPTION DERM
LOCATION SIMPLE: RIGHT LOWER BACK
LOCATION SIMPLE: UPPER BACK
LOCATION SIMPLE: LOWER BACK
LOCATION SIMPLE: LEFT PRETIBIAL REGION
LOCATION SIMPLE: LEFT CHEEK
LOCATION SIMPLE: RIGHT SCALP
LOCATION SIMPLE: RIGHT FOREARM
LOCATION SIMPLE: LEFT FOREARM
LOCATION SIMPLE: RIGHT PRETIBIAL REGION

## 2019-08-16 ASSESSMENT — LOCATION ZONE DERM
LOCATION ZONE: ARM
LOCATION ZONE: FACE
LOCATION ZONE: LEG
LOCATION ZONE: TRUNK
LOCATION ZONE: SCALP

## 2019-08-16 ASSESSMENT — LOCATION DETAILED DESCRIPTION DERM
LOCATION DETAILED: LEFT MEDIAL MALAR CHEEK
LOCATION DETAILED: RIGHT MEDIAL FRONTAL SCALP
LOCATION DETAILED: RIGHT PROXIMAL PRETIBIAL REGION
LOCATION DETAILED: INFERIOR THORACIC SPINE
LOCATION DETAILED: RIGHT SUPERIOR MEDIAL MIDBACK
LOCATION DETAILED: LEFT PROXIMAL PRETIBIAL REGION
LOCATION DETAILED: SUPERIOR LUMBAR SPINE
LOCATION DETAILED: RIGHT PROXIMAL DORSAL FOREARM
LOCATION DETAILED: LEFT PROXIMAL DORSAL FOREARM

## 2019-09-05 ENCOUNTER — RX ONLY (RX ONLY)
Age: 59
End: 2019-09-05

## 2019-09-05 RX ORDER — MINOCYCLINE HYDROCHLORIDE 100 MG/1
100 CAPSULE ORAL BID
Qty: 180 | Refills: 0 | Status: CANCELLED
Stop reason: CLARIF

## 2020-01-27 ENCOUNTER — NURSE TRIAGE (OUTPATIENT)
Dept: NURSING | Facility: CLINIC | Age: 60
End: 2020-01-27

## 2020-01-27 NOTE — TELEPHONE ENCOUNTER
Chris is calling and states that his refills have  for Atorvastatin.  Chris is requesting a new prescription for Atorvastatin.  FNA advised to contact MD Park and Chris agreed.

## 2020-01-28 DIAGNOSIS — I25.10 CORONARY ARTERY DISEASE INVOLVING NATIVE CORONARY ARTERY OF NATIVE HEART WITHOUT ANGINA PECTORIS: ICD-10-CM

## 2020-01-28 RX ORDER — ATORVASTATIN CALCIUM 10 MG/1
10 TABLET, FILM COATED ORAL EVERY OTHER DAY
Qty: 45 TABLET | Refills: 1 | Status: SHIPPED | OUTPATIENT
Start: 2020-01-28 | End: 2020-07-15

## 2020-07-15 DIAGNOSIS — I25.10 CORONARY ARTERY DISEASE INVOLVING NATIVE CORONARY ARTERY OF NATIVE HEART WITHOUT ANGINA PECTORIS: ICD-10-CM

## 2020-07-15 RX ORDER — ATORVASTATIN CALCIUM 10 MG/1
10 TABLET, FILM COATED ORAL EVERY OTHER DAY
Qty: 45 TABLET | Refills: 1 | Status: SHIPPED | OUTPATIENT
Start: 2020-07-15 | End: 2020-12-21

## 2020-07-17 ENCOUNTER — APPOINTMENT (OUTPATIENT)
Age: 60
Setting detail: DERMATOLOGY
End: 2020-07-17

## 2020-07-17 VITALS — RESPIRATION RATE: 16 BRPM | WEIGHT: 165 LBS | HEIGHT: 69 IN

## 2020-07-17 DIAGNOSIS — L57.8 OTHER SKIN CHANGES DUE TO CHRONIC EXPOSURE TO NONIONIZING RADIATION: ICD-10-CM

## 2020-07-17 DIAGNOSIS — D18.0 HEMANGIOMA: ICD-10-CM

## 2020-07-17 DIAGNOSIS — L72.0 EPIDERMAL CYST: ICD-10-CM

## 2020-07-17 DIAGNOSIS — L82.1 OTHER SEBORRHEIC KERATOSIS: ICD-10-CM

## 2020-07-17 DIAGNOSIS — L81.4 OTHER MELANIN HYPERPIGMENTATION: ICD-10-CM

## 2020-07-17 DIAGNOSIS — L57.0 ACTINIC KERATOSIS: ICD-10-CM

## 2020-07-17 DIAGNOSIS — L90.5 SCAR CONDITIONS AND FIBROSIS OF SKIN: ICD-10-CM

## 2020-07-17 DIAGNOSIS — L81.5 LEUKODERMA, NOT ELSEWHERE CLASSIFIED: ICD-10-CM

## 2020-07-17 DIAGNOSIS — Z85.828 PERSONAL HISTORY OF OTHER MALIGNANT NEOPLASM OF SKIN: ICD-10-CM

## 2020-07-17 DIAGNOSIS — D22 MELANOCYTIC NEVI: ICD-10-CM

## 2020-07-17 PROBLEM — D22.5 MELANOCYTIC NEVI OF TRUNK: Status: ACTIVE | Noted: 2020-07-17

## 2020-07-17 PROBLEM — D18.01 HEMANGIOMA OF SKIN AND SUBCUTANEOUS TISSUE: Status: ACTIVE | Noted: 2020-07-17

## 2020-07-17 PROBLEM — D22.4 MELANOCYTIC NEVI OF SCALP AND NECK: Status: ACTIVE | Noted: 2020-07-17

## 2020-07-17 PROCEDURE — 17003 DESTRUCT PREMALG LES 2-14: CPT

## 2020-07-17 PROCEDURE — OTHER REASSURANCE: OTHER

## 2020-07-17 PROCEDURE — OTHER COUNSELING: OTHER

## 2020-07-17 PROCEDURE — 99214 OFFICE O/P EST MOD 30 MIN: CPT | Mod: 25

## 2020-07-17 PROCEDURE — 17000 DESTRUCT PREMALG LESION: CPT

## 2020-07-17 PROCEDURE — OTHER LIQUID NITROGEN: OTHER

## 2020-07-17 ASSESSMENT — LOCATION SIMPLE DESCRIPTION DERM
LOCATION SIMPLE: LEFT CHEEK
LOCATION SIMPLE: RIGHT LOWER BACK
LOCATION SIMPLE: LOWER BACK
LOCATION SIMPLE: RIGHT CHEEK
LOCATION SIMPLE: SCALP
LOCATION SIMPLE: CHEST
LOCATION SIMPLE: RIGHT EAR
LOCATION SIMPLE: UPPER BACK
LOCATION SIMPLE: LEFT PRETIBIAL REGION
LOCATION SIMPLE: LEFT FOREARM
LOCATION SIMPLE: RIGHT FOREARM
LOCATION SIMPLE: RIGHT PRETIBIAL REGION
LOCATION SIMPLE: NOSE
LOCATION SIMPLE: ABDOMEN

## 2020-07-17 ASSESSMENT — LOCATION ZONE DERM
LOCATION ZONE: LEG
LOCATION ZONE: FACE
LOCATION ZONE: ARM
LOCATION ZONE: NOSE
LOCATION ZONE: TRUNK
LOCATION ZONE: EAR
LOCATION ZONE: SCALP

## 2020-07-17 ASSESSMENT — LOCATION DETAILED DESCRIPTION DERM
LOCATION DETAILED: RIGHT SUPERIOR POSTERIOR HELIX
LOCATION DETAILED: SUPERIOR LUMBAR SPINE
LOCATION DETAILED: RIGHT SUPERIOR MEDIAL MIDBACK
LOCATION DETAILED: RIGHT CENTRAL FRONTAL SCALP
LOCATION DETAILED: INFERIOR THORACIC SPINE
LOCATION DETAILED: LEFT PROXIMAL PRETIBIAL REGION
LOCATION DETAILED: EPIGASTRIC SKIN
LOCATION DETAILED: RIGHT MID PREAURICULAR CHEEK
LOCATION DETAILED: RIGHT LATERAL MALAR CHEEK
LOCATION DETAILED: RIGHT NASAL DORSUM
LOCATION DETAILED: PERIUMBILICAL SKIN
LOCATION DETAILED: LEFT MEDIAL INFERIOR CHEST
LOCATION DETAILED: RIGHT PROXIMAL DORSAL FOREARM
LOCATION DETAILED: LEFT PROXIMAL DORSAL FOREARM
LOCATION DETAILED: STERNUM
LOCATION DETAILED: RIGHT PROXIMAL PRETIBIAL REGION
LOCATION DETAILED: LEFT LATERAL MANDIBULAR CHEEK
LOCATION DETAILED: LEFT INFERIOR CENTRAL MALAR CHEEK
LOCATION DETAILED: LEFT MEDIAL MALAR CHEEK

## 2020-07-17 NOTE — PROCEDURE: LIQUID NITROGEN
Number Of Freeze-Thaw Cycles: 2 freeze-thaw cycles
Post-Care Instructions: I reviewed with the patient in detail post-care instructions. Patient is to wear sunprotection, and avoid picking at any of the treated lesions. Pt may apply Vaseline to crusted or scabbing areas.
Render Note In Bullet Format When Appropriate: No
Render Post-Care Instructions In Note?: yes
Consent: The patient's consent was obtained including but not limited to risks of crusting, scabbing, blistering, scarring, darker or lighter pigmentary change, recurrence, incomplete removal and infection.
Duration Of Freeze Thaw-Cycle (Seconds): 3
Detail Level: Detailed

## 2020-09-17 LAB
ALT SERPL-CCNC: 24 U/L (ref 8–45)
ANION GAP SERPL CALCULATED.3IONS-SCNC: 30 MMOL/L (ref 21–31)
BUN SERPL-MCNC: 21 MG/DL (ref 8–25)
CALCIUM SERPL-MCNC: 9.5 MG/DL (ref 8.5–10.5)
CHLORIDE SERPLBLD-SCNC: 104 MMOL/L (ref 98–110)
CHOLEST SERPL-MCNC: 166 MG/DL (ref 100–199)
CO2 SERPL-SCNC: NORMAL MMOL/L
CREAT SERPL-MCNC: 0.85 MG/DL (ref 0.57–1.11)
GFR SERPL CREATININE-BSD FRML MDRD: >60 ML/MIN/1.73M2
GLUCOSE SERPL-MCNC: 91 MG/DL (ref 65–100)
HDLC SERPL-MCNC: 83 MG/DL
LDLC SERPL CALC-MCNC: 71 MG/DL
POTASSIUM SERPL-SCNC: 4.2 MMOL/L (ref 3.5–5)
SODIUM SERPL-SCNC: 140 MMOL/L (ref 135–145)
TRIGL SERPL-MCNC: 61 MG/DL

## 2020-09-25 ENCOUNTER — RX ONLY (RX ONLY)
Age: 60
End: 2020-09-25

## 2020-09-25 RX ORDER — MINOCYCLINE HYDROCHLORIDE 100 MG/1
100MG CAPSULE ORAL BID
Qty: 60 | Refills: 6 | Status: ERX

## 2020-11-12 ENCOUNTER — RX ONLY (RX ONLY)
Age: 60
End: 2020-11-12

## 2020-11-12 RX ORDER — MINOCYCLINE HYDROCHLORIDE 100 MG/1
100MG CAPSULE ORAL BID
Qty: 180 | Refills: 3 | Status: ERX

## 2020-12-21 DIAGNOSIS — I25.10 CORONARY ARTERY DISEASE INVOLVING NATIVE CORONARY ARTERY OF NATIVE HEART WITHOUT ANGINA PECTORIS: ICD-10-CM

## 2020-12-21 RX ORDER — ATORVASTATIN CALCIUM 10 MG/1
10 TABLET, FILM COATED ORAL EVERY OTHER DAY
Qty: 45 TABLET | Refills: 0 | Status: SHIPPED | OUTPATIENT
Start: 2020-12-21 | End: 2021-03-24

## 2020-12-21 NOTE — TELEPHONE ENCOUNTER
Received refill request for:  Atorvastatin  Last OV was: 5/29/2019 with HIPOLITO Rosales  Labs/EKG: last lipid 5/29/2019  F/U scheduled: overdue orders in Epic.  Letter sent  New script sent to: [facility]

## 2021-01-08 ENCOUNTER — PRE VISIT (OUTPATIENT)
Dept: CARDIOLOGY | Facility: CLINIC | Age: 61
End: 2021-01-08

## 2021-01-21 ENCOUNTER — HOSPITAL ENCOUNTER (OUTPATIENT)
Dept: CARDIOLOGY | Facility: CLINIC | Age: 61
Discharge: HOME | End: 2021-01-21
Attending: INTERNAL MEDICINE | Admitting: INTERNAL MEDICINE
Payer: COMMERCIAL

## 2021-01-21 DIAGNOSIS — I25.10 CORONARY ARTERY DISEASE INVOLVING NATIVE CORONARY ARTERY OF NATIVE HEART WITHOUT ANGINA PECTORIS: ICD-10-CM

## 2021-01-21 LAB
ANION GAP SERPL CALCULATED.3IONS-SCNC: 2 MMOL/L (ref 3–14)
BUN SERPL-MCNC: 20 MG/DL (ref 7–30)
CALCIUM SERPL-MCNC: 8.9 MG/DL (ref 8.5–10.1)
CHLORIDE SERPL-SCNC: 106 MMOL/L (ref 94–109)
CHOLEST SERPL-MCNC: 147 MG/DL
CO2 SERPL-SCNC: 30 MMOL/L (ref 20–32)
CREAT SERPL-MCNC: 0.9 MG/DL (ref 0.66–1.25)
GFR SERPL CREATININE-BSD FRML MDRD: >90 ML/MIN/{1.73_M2}
GLUCOSE SERPL-MCNC: 97 MG/DL (ref 70–99)
HDLC SERPL-MCNC: 91 MG/DL
LDLC SERPL CALC-MCNC: 35 MG/DL
NONHDLC SERPL-MCNC: 56 MG/DL
POTASSIUM SERPL-SCNC: 4.2 MMOL/L (ref 3.4–5.3)
SODIUM SERPL-SCNC: 138 MMOL/L (ref 133–144)
TRIGL SERPL-MCNC: 107 MG/DL

## 2021-01-21 PROCEDURE — 93306 TTE W/DOPPLER COMPLETE: CPT | Mod: 26 | Performed by: INTERNAL MEDICINE

## 2021-01-21 PROCEDURE — 93306 TTE W/DOPPLER COMPLETE: CPT

## 2021-01-21 PROCEDURE — 80048 BASIC METABOLIC PNL TOTAL CA: CPT | Performed by: INTERNAL MEDICINE

## 2021-01-21 PROCEDURE — 36415 COLL VENOUS BLD VENIPUNCTURE: CPT | Performed by: INTERNAL MEDICINE

## 2021-01-21 PROCEDURE — 80061 LIPID PANEL: CPT | Performed by: INTERNAL MEDICINE

## 2021-01-26 ENCOUNTER — VIRTUAL VISIT (OUTPATIENT)
Dept: CARDIOLOGY | Facility: CLINIC | Age: 61
End: 2021-01-26
Payer: COMMERCIAL

## 2021-01-26 DIAGNOSIS — I10 BENIGN ESSENTIAL HYPERTENSION: ICD-10-CM

## 2021-01-26 DIAGNOSIS — E78.00 PURE HYPERCHOLESTEROLEMIA: ICD-10-CM

## 2021-01-26 DIAGNOSIS — I25.10 CORONARY ARTERY DISEASE INVOLVING NATIVE CORONARY ARTERY OF NATIVE HEART WITHOUT ANGINA PECTORIS: Primary | ICD-10-CM

## 2021-01-26 DIAGNOSIS — I25.5 ISCHEMIC CARDIOMYOPATHY: ICD-10-CM

## 2021-01-26 DIAGNOSIS — I77.810 AORTIC ROOT DILATION (H): ICD-10-CM

## 2021-01-26 PROCEDURE — 99212 OFFICE O/P EST SF 10 MIN: CPT | Mod: GT | Performed by: INTERNAL MEDICINE

## 2021-01-26 NOTE — PROGRESS NOTES
Chris is a 60 year old who is being evaluated via a billable video visit.      How would you like to obtain your AVS? Mail a copy  If the video visit is dropped, the invitation should be resent by: Text to cell phone: [phone]  Will anyone else be joining your video visit? No     Vitals reported by patient:  Weight: 168 lbs    Review Of Systems  Skin: negative  Eyes: negative  Ears/Nose/Throat: negative  Respiratory: No shortness of breath, dyspnea on exertion, cough, or hemoptysis  Cardiovascular: negative  Gastrointestinal: negative  Genitourinary: negative  Musculoskeletal: negative  Neurologic: negative  Psychiatric: negative  Hematologic/Lymphatic/Immunologic: negative  Endocrine: negative  Reviewed by: Yulia Junior MA    Video Start Time: 4:31 PM  Video-Visit Details    Type of service:  Video Visit    Video End Time:4:45 PM    Originating Location (pt. Location): Home    Distant Location (provider location):  [facility]     Platform used for Video Visit: [employer]     .General:  no apparent distress, normal body habitus, sitting upright.  ENT/Mouth:  membranes moist, no nasal discharge.  Normal head shape, no apparent injury or laceration.  Eyes:  no scleral icterus, normal conjunctivae.  No observed jaundice.  Neck:  no apparent neck swelling.   Chest/Lungs:  No breathing difficulty while speaking.  No audible wheezing.  No cough during conversation.  Cardiovascular:  No obviously elevated jugular venous pressure.    Extremities:  no apparent cyanosis.  Skin:  no xanthelasma.  No facial lacerations.  Neurologic:  Normal arm motion bilateral, no tremors.    Psychiatric:  Alert and oriented x3, calm demeanor    The rest of the comprehensive physical examination is deferred due to public health emergency video visit restrictions.

## 2021-01-26 NOTE — PROGRESS NOTES
Service Date: 01/26/2021      VIDEO VISIT      HISTORY OF PRESENT ILLNESS:  Chris is a very nice 60-year-old gentleman with past medical history significant for stenting of his proximal left anterior descending artery due to unstable angina in 2012.  He also has hypercholesterolemia and ischemic cardiomyopathy, mildly dilated ascending aorta.  I first met him in 2017 when he wanted to establish with Cardiology as he was having some side effects of the statins and wanted to treat things in a more natural fashion.      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  He works at least 5 days a week, alternating bike riding with walking, and then does some resistance activity as well.  He states he retired this last year and has ramped up his exercise even more.  He has always followed a fairly strict diet.      His problems to statins was vivid dreams with rosuvastatin.  He felt lousy on simvastatin and ultimately switched him to atorvastatin and now backed off to atorvastatin 10 mg on an every other day regimen.      Due to COVID pandemic, a video visit was conducted with Chris today.  Chris states that he is feeling great.  He has no chest, arm, neck, jaw or shoulder discomfort.  No dyspnea on exertion, orthopnea or PND.  No palpitations, lightheadedness, dizziness, syncope or near-syncope.  As stated with COVID and custodial, he has ramped up his exercise regimen.  He has lost weight.  He reports a weight of 168 pounds, which is his home weight.  His last office weight was 182.  He does not think he has lost this much weight, but thinks there is a significant difference between scales.      He checks his blood pressure periodically.  He reports a blood pressure today of 135/76 with his electronic device.  He states this, if anything, is on the high side as his blood pressure is usually in the 120 range and lower 70s.      ASSESSMENT AND PLAN:  Chris appears to be doing well from a cardiac standpoint without clinical  evidence of ischemia.      We did repeat his echocardiogram and his cardiomyopathy ejection fraction looks better this year at 50%- -55%, up from his previous 45%-50%.  He still has a mildly dilated ascending aorta that does not appear to have changed over the last 2 years.      He has no significant valvular pathology.      As stated, blood pressure is well-controlled at 135/76, and if anything, this is on his high side.  We will continue his antihypertensive regimen as is.      Fasting lipid profile is the best he has ever had, between his increase in exercise and weight loss.  Total cholesterol is now down to 147, HDL is up to 91, LDL is 35 and triglycerides are 107.  We will continue his regimen as is.  I have congratulated him on his healthy lifestyle and encouraged him to continue to do so.      Electrolytes are normal with a creatinine of 0.9, a BUN of 20, giving him a GFR of greater than 90.  Electrolytes are normal.      I will have him follow up with my GERONIMO in 1 year.  I will see him back in 2 years.  If he should have any problems, I would be glad to see him sooner.      Thank you for allowing me to participate in his care.      MD ZANDER Arreola MD, [facility]             D: 2021   T: 2021   MT: al      Name:     NGA RIOS   MRN:      [id number]        Account:      [id number]   :      1960           Service Date: 2021      Document: [id number]

## 2021-01-26 NOTE — LETTER
1/26/2021    Bharathi Chowdary MD  [address]  [facility] [address]    RE: Chris Barriga       Dear Colleague,    I had the pleasure of seeing Chris Barriga in the [facility].    Chris is a 60 year old who is being evaluated via a billable video visit.      How would you like to obtain your AVS? Mail a copy  If the video visit is dropped, the invitation should be resent by: Text to cell phone: [phone]  Will anyone else be joining your video visit? No     Vitals reported by patient:  Weight: 168 lbs    Review Of Systems  Skin: negative  Eyes: negative  Ears/Nose/Throat: negative  Respiratory: No shortness of breath, dyspnea on exertion, cough, or hemoptysis  Cardiovascular: negative  Gastrointestinal: negative  Genitourinary: negative  Musculoskeletal: negative  Neurologic: negative  Psychiatric: negative  Hematologic/Lymphatic/Immunologic: negative  Endocrine: negative  Reviewed by: Yulia Junior MA    Video Start Time: 4:31 PM  Video-Visit Details    Type of service:  Video Visit    Video End Time:4:45 PM    Originating Location (pt. Location): Home    Distant Location (provider location):  [facility]     Platform used for Video Visit: [employer]     .General:  no apparent distress, normal body habitus, sitting upright.  ENT/Mouth:  membranes moist, no nasal discharge.  Normal head shape, no apparent injury or laceration.  Eyes:  no scleral icterus, normal conjunctivae.  No observed jaundice.  Neck:  no apparent neck swelling.   Chest/Lungs:  No breathing difficulty while speaking.  No audible wheezing.  No cough during conversation.  Cardiovascular:  No obviously elevated jugular venous pressure.    Extremities:  no apparent cyanosis.  Skin:  no xanthelasma.  No facial lacerations.  Neurologic:  Normal arm motion bilateral, no tremors.    Psychiatric:  Alert and oriented x3, calm demeanor    The rest of the comprehensive physical  examination is deferred due to public health emergency video visit restrictions.        Service Date: 01/26/2021      VIDEO VISIT      HISTORY OF PRESENT ILLNESS:  Chris is a very nice 60-year-old gentleman with past medical history significant for stenting of his proximal left anterior descending artery due to unstable angina in 2012.  He also has hypercholesterolemia and ischemic cardiomyopathy, mildly dilated ascending aorta.  I first met him in 2017 when he wanted to establish with Cardiology as he was having some side effects of the statins and wanted to treat things in a more natural fashion.      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  He works at least 5 days a week, alternating bike riding with walking, and then does some resistance activity as well.  He states he retired this last year and has ramped up his exercise even more.  He has always followed a fairly strict diet.      His problems to statins was vivid dreams with rosuvastatin.  He felt lousy on simvastatin and ultimately switched him to atorvastatin and now backed off to atorvastatin 10 mg on an every other day regimen.      Due to COVID pandemic, a video visit was conducted with Chris today.  Chris states that he is feeling great.  He has no chest, arm, neck, jaw or shoulder discomfort.  No dyspnea on exertion, orthopnea or PND.  No palpitations, lightheadedness, dizziness, syncope or near-syncope.  As stated with COVID and assisted, he has ramped up his exercise regimen.  He has lost weight.  He reports a weight of 168 pounds, which is his home weight.  His last office weight was 182.  He does not think he has lost this much weight, but thinks there is a significant difference between scales.      He checks his blood pressure periodically.  He reports a blood pressure today of 135/76 with his electronic device.  He states this, if anything, is on the high side as his blood pressure is usually in the 120 range and lower 70s.      ASSESSMENT AND  PLAN:  Nga appears to be doing well from a cardiac standpoint without clinical evidence of ischemia.      We did repeat his echocardiogram and his cardiomyopathy ejection fraction looks better this year at 50%- -55%, up from his previous 45%-50%.  He still has a mildly dilated ascending aorta that does not appear to have changed over the last 2 years.      He has no significant valvular pathology.      As stated, blood pressure is well-controlled at 135/76, and if anything, this is on his high side.  We will continue his antihypertensive regimen as is.      Fasting lipid profile is the best he has ever had, between his increase in exercise and weight loss.  Total cholesterol is now down to 147, HDL is up to 91, LDL is 35 and triglycerides are 107.  We will continue his regimen as is.  I have congratulated him on his healthy lifestyle and encouraged him to continue to do so.      Electrolytes are normal with a creatinine of 0.9, a BUN of 20, giving him a GFR of greater than 90.  Electrolytes are normal.      I will have him follow up with my GERONIMO in 1 year.  I will see him back in 2 years.  If he should have any problems, I would be glad to see him sooner.      Thank you for allowing me to participate in his care.      Jan Park MD        D: 2021   T: 2021   MT: al      Name:     NGA RIOS   MRN:      [id number]        Account:      [id number]   :      1960           Service Date: 2021      Document: [id number]        Thank you for allowing me to participate in the care of your patient.    Sincerely,     Jan Park MD     [facility]

## 2021-01-27 ENCOUNTER — TELEPHONE (OUTPATIENT)
Dept: CARDIOLOGY | Facility: CLINIC | Age: 61
End: 2021-01-27

## 2021-01-27 DIAGNOSIS — I77.810 AORTIC ROOT DILATION (H): ICD-10-CM

## 2021-01-27 DIAGNOSIS — I25.10 CORONARY ARTERY DISEASE INVOLVING NATIVE CORONARY ARTERY OF NATIVE HEART WITHOUT ANGINA PECTORIS: Primary | ICD-10-CM

## 2021-01-27 NOTE — TELEPHONE ENCOUNTER
Attempted to contact patient to review echo results per Dr. Park. Left message for patient to call back.   R FOLLOW UP NOTE    Jaxon Hernandez is here for follow up on medical management of medical conditons while on the [facility] Program for Weight [facility] and Weight Management.   Patient is in Week 15  Patient is on 3+2 using [employer].   Last labs:  Cholesterol results from December 1:  Total cholesterol 123, LDL 47, HDL 60, triglycerides 78, non-HDL cholesterol 63, cholesterol HDL 2.0, uric acid 3.7, CBC normal with exception of RDW-CV at 19.8 slightly elevated.  Induction labs.   Lab Results   Component Value Date    SODIUM 138 10/24/2016    SODIUM 139 10/12/2016    POTASSIUM 4.4 10/24/2016    POTASSIUM 4.6 10/12/2016    CHLORIDE 105 10/24/2016    CHLORIDE 102 10/12/2016    CO2 22 10/24/2016    CO2 25 10/12/2016    BUN 27 (H) 10/24/2016    BUN 29 (H) 10/12/2016    CREATININE 1.29 (H) 10/24/2016    CREATININE 1.30 (H) 10/12/2016    GLUCOSE 104 (H) 10/24/2016    GLUCOSE 120 (H) 10/12/2016     Hemoglobin A1C (%)   Date Value   10/12/2016 6.8 (H)   06/10/2016 9.0 (H)        Symptoms noted this past week as mentioned in the nurses note have been reviewed and discussed.   Problems reviewed and discussed:  None    Blood sugars am <101-126 noon <110 supper <100, hs <120    He saw his PCP Dr. Lara earlier today who stopped his Invokana and bisoprolol.  His iron is stabilized however he will be having further evaluation for why he continues to need the iron. He will double check with Dr. Jerome Lara re if he was to stop iron completely or decrease it to one tablet / day    Batteling with the word \"should\". Not getting in 2,000 vandana / wk exercise. Identifies that he \"should\" go to the gym. And get in the 2000 vandana/pa. He has it all planned out and calculated on paper what he has to do to get this in. However his last visit to the gym was 1 month ago.   Mental barrier to the gym. Does not do mid week call due to embarrassment of not getting the PA.   Goal for this week is just to show up at the gym even if it is for only 20  minutes.    Patient Active Problem List   Diagnosis   • Hyperlipidemia, unspecified   • Type II or unspecified type diabetes mellitus without mention of complication, uncontrolled   • Obesity, unspecified   • Coronary artery disease involving native coronary artery of native heart without angina pectoris   • Senile nuclear sclerosis   • LATTICE DEGENERATION OD   • Other dermatitis due to solar radiation   • Actinic keratosis   • History of basal cell carcinoma tmple 2004, left arm 85   • Freckled skin   • Granuloma annulare   • Seborrheic dermatitis, unspecified   • Hammertoe   • Sensorineural hearing loss, bilateral   • Bronchiectasis without complication   • Persistent cough   • Mild obstructive sleep apnea   • Tracheobronchomalacia   • Uncomplicated asthma   • Tendonitis, Achilles, right   • Tendonitis, Achilles, left   • Essential hypertension   • IDDM (insulin dependent diabetes mellitus)   • Obesity, Class III, BMI 40-49.9 (morbid obesity)   • Encounter for weight loss counseling   • Iron deficiency   • Essential hypertension with goal blood pressure less than 140/90          Exam:  Blood pressure 114/72, pulse 72, height 5' 9.75\" (1.772 m), weight 101.8 kg.  Well 68 year old  male.   CVS: regular rate and rhythm, S1, S2, no S3 or murmurs  Ext no edema.     Diagnoses and associated orders for this visit.    Jaxon was seen today for medical weight management.    Diagnoses and all orders for this visit:    Encounter for weight loss counseling    Obesity, Class III, BMI 40-49.9 (morbid obesity)    IDDM (insulin dependent diabetes mellitus)  Uncontrolled type 2 diabetes mellitus without complication, with long-term current use of insulin  Lantus 10 units at H.s.    Consider decreasing sliding scale to 1,3,5,7,9  Humalog before meals   <109 0 U  110 - 125 2 U  126-150 4 U  151-175 6 U  176-200 8 U  201-225 10 U  Invokana, 100 mg daily stopped  Metformin 1000 mg b.i.d.    Essential hypertension  Off  Enalapril    Off Bisoprolol 5 mg daily    Coronary artery disease involving native coronary artery of native heart without angina pectoris  Aspirin daily    Hyperlipidemia, unspecified   Fenofibrate 134 mg daily Lipitor 80 mg daily  Iron defieicncy    Level of medical management HL-2  Medication changes decrease lantus to 10 U  Next Labs: Next visit: 4  week

## 2021-01-27 NOTE — TELEPHONE ENCOUNTER
----- Message from Jan Park MD sent at 1/26/2021  4:51 PM CST -----  I forgot to go over his echo with him today.  Please reviewed that his ejection fraction if anything looks better.  Is a sending aortic dilatation is unchanged.  He has no valvular problems.  I will repeat it in 2 years.

## 2021-01-28 NOTE — TELEPHONE ENCOUNTER
Spoke with Patient and echocardiogram results reviewed. All questions answered.  Patient agrees with repeat in 2 years.     Echo order entered as reminder.

## 2021-02-14 ENCOUNTER — HEALTH MAINTENANCE LETTER (OUTPATIENT)
Age: 61
End: 2021-02-14

## 2021-03-24 ENCOUNTER — TELEPHONE (OUTPATIENT)
Dept: CARDIOLOGY | Facility: CLINIC | Age: 61
End: 2021-03-24

## 2021-03-24 DIAGNOSIS — E78.00 PURE HYPERCHOLESTEROLEMIA: Primary | ICD-10-CM

## 2021-03-24 DIAGNOSIS — I25.10 CORONARY ARTERY DISEASE INVOLVING NATIVE CORONARY ARTERY OF NATIVE HEART WITHOUT ANGINA PECTORIS: ICD-10-CM

## 2021-03-24 RX ORDER — ATORVASTATIN CALCIUM 10 MG/1
10 TABLET, FILM COATED ORAL EVERY OTHER DAY
Qty: 45 TABLET | Refills: 2 | Status: ON HOLD | OUTPATIENT
Start: 2021-03-24 | End: 2021-07-31

## 2021-03-24 NOTE — TELEPHONE ENCOUNTER
My Chart message 3-24-21 - We recently returned from [place] where we were able to get the Moderna COVID-19 vaccine.  I have received a few emails on scheduling an appointment for the vaccine.  I had my [facility] provider update my account; does [employer] also need to do this?  Please advise.  Thanks.     Chris Barriga

## 2021-03-24 NOTE — TELEPHONE ENCOUNTER
My Chart message 3-24-21  I was notified from [facility] that my Atorvastatin prescription needs to be renewed.  Let me know if you need anything from me to get this processed.  Thanks.     Chris Barriga    Last Dr. Park visit 1-26-21. We will continue his regimen as is.   Currently on Atorvastatin 10 mg every other day.     Medication e scribed.

## 2021-06-29 ENCOUNTER — TELEPHONE (OUTPATIENT)
Dept: CARDIOLOGY | Facility: CLINIC | Age: 61
End: 2021-06-29

## 2021-06-29 DIAGNOSIS — I25.10 CORONARY ARTERY DISEASE INVOLVING NATIVE CORONARY ARTERY OF NATIVE HEART WITHOUT ANGINA PECTORIS: Primary | ICD-10-CM

## 2021-06-29 NOTE — TELEPHONE ENCOUNTER
Patient called to discuss some discomfort lately while biking.  Attempted to return call. Message left.    Last seen by Dr. [id number].

## 2021-06-30 ENCOUNTER — DOCUMENTATION ONLY (OUTPATIENT)
Dept: CARDIOLOGY | Facility: CLINIC | Age: 61
End: 2021-06-30

## 2021-07-01 NOTE — TELEPHONE ENCOUNTER
Called patient multiple times, he called once today to try to connect. Left a message suggesting he try using the my chart email as another option.

## 2021-07-05 NOTE — TELEPHONE ENCOUNTER
My Chart message:  Dr. Park:     I have traded several messages with the nurse line this past week regarding some unusual symptoms I have been experiencing recently.  Since we have not connected, I thought I would try a message on [employer].  Over the past 10 days or so, I have had some slight chest tightness / nausea at some point during approximately half of my bike rides (4 of 7 rides during this time period).  It has varied from vigorous rides like a spin class (once) to longer, leisurely rides (twice) to one fairly tough road ride (24 miles / several steep hills).  The other 3 times (again varied rides) everything was normal and I experienced no symptoms at all.  This reminds me of similar symptoms I experienced 9 years ago prior to my stent where it took a while to diagnose my blockage due to my other risk factors looking so good.  I am not an alarmist (as my records should show) but something seems off.  Let me know what you recommend at this juncture.     Thanks.     Chris Barriga     Hx stent placement in 2012, no stress testing since that time. Seen routinely for CAD/stent to proximal LAD '12, HTN, HLD, iliac artery aneurysm, aortic root dilation     Will message Dr. Park to review    Reply to patient:    Demetrius, Mr. Barriga,  Thank you for sending the My Chart update. We have routed this to Dr. Park. He is on vacation this week, but sometimes reviews message. We will update you as soon as he can send us a plan for next steps.    Team 2 RNs  [phone]

## 2021-07-06 ENCOUNTER — MYC MEDICAL ADVICE (OUTPATIENT)
Dept: CARDIOLOGY | Facility: CLINIC | Age: 61
End: 2021-07-06

## 2021-07-06 NOTE — TELEPHONE ENCOUNTER
Reply from Dr. Park,  -Set him up for stress echo. Thanks     Attempted to contact patient to review Dr. Park's recommendation. Left a message and sent a My Chart update.    Demetrius, Mr. Barriga,    Dr. Park has replied back and is recommending the next step for you - setting up a stress echo.  The scheduling team will be trying to reach you by phone, but if that is not working, you can call in to [phone] to set up an appointment for the test.    Team 2 RNs  [phone]            -

## 2021-07-23 ENCOUNTER — HOSPITAL ENCOUNTER (OUTPATIENT)
Dept: CARDIOLOGY | Facility: CLINIC | Age: 61
Discharge: HOME | End: 2021-07-23
Attending: INTERNAL MEDICINE | Admitting: INTERNAL MEDICINE
Payer: COMMERCIAL

## 2021-07-23 ENCOUNTER — TELEPHONE (OUTPATIENT)
Dept: CARDIOLOGY | Facility: CLINIC | Age: 61
End: 2021-07-23

## 2021-07-23 DIAGNOSIS — I25.10 CORONARY ARTERY DISEASE INVOLVING NATIVE CORONARY ARTERY OF NATIVE HEART WITHOUT ANGINA PECTORIS: ICD-10-CM

## 2021-07-23 DIAGNOSIS — I25.10 CORONARY ARTERY DISEASE INVOLVING NATIVE CORONARY ARTERY OF NATIVE HEART WITHOUT ANGINA PECTORIS: Primary | ICD-10-CM

## 2021-07-23 DIAGNOSIS — I25.5 ISCHEMIC CARDIOMYOPATHY: Primary | ICD-10-CM

## 2021-07-23 PROCEDURE — 93350 STRESS TTE ONLY: CPT | Mod: 26 | Performed by: INTERNAL MEDICINE

## 2021-07-23 PROCEDURE — 93018 CV STRESS TEST I&R ONLY: CPT | Performed by: INTERNAL MEDICINE

## 2021-07-23 PROCEDURE — 999N000208 ECHO STRESS ECHOCARDIOGRAM

## 2021-07-23 PROCEDURE — 93325 DOPPLER ECHO COLOR FLOW MAPG: CPT | Mod: 26 | Performed by: INTERNAL MEDICINE

## 2021-07-23 PROCEDURE — 93321 DOPPLER ECHO F-UP/LMTD STD: CPT | Mod: 26 | Performed by: INTERNAL MEDICINE

## 2021-07-23 PROCEDURE — 255N000002 HC RX 255 OP 636: Performed by: INTERNAL MEDICINE

## 2021-07-23 PROCEDURE — 93016 CV STRESS TEST SUPVJ ONLY: CPT | Performed by: INTERNAL MEDICINE

## 2021-07-23 RX ORDER — AMLODIPINE BESYLATE 5 MG/1
TABLET ORAL
Qty: 20 TABLET | Refills: 1 | Status: ON HOLD | OUTPATIENT
Start: 2021-07-23 | End: 2021-07-28

## 2021-07-23 RX ADMIN — HUMAN ALBUMIN MICROSPHERES AND PERFLUTREN 9 ML: 10; .22 INJECTION, SOLUTION INTRAVENOUS at 09:15

## 2021-07-23 NOTE — TELEPHONE ENCOUNTER
Spoke with patient to review stress test as somewhat abnormal. Patient is pleased to be seeing the GERONIMO next week to discuss whether he needs an angiogram.    Rx escripted for norvasc 2.5mg daily (1/2 tab of 5mg) for 30 days. Patient will try to start that today.

## 2021-07-23 NOTE — TELEPHONE ENCOUNTER
Stress echo 7-23-21   Abnormal stress echo with distal anterolateral ischemia in a diagonal / OM  distribution post stress with chest discomfort during the test but overall  good exercise tolerance.    Test recommended by Dr. Park after Patient My Chart message 7-5-21 -   I have traded several messages with the nurse line this past week regarding some unusual symptoms I have been experiencing recently.  Since we have not connected, I thought I would try a message on [employer].  Over the past 10 days or so, I have had some slight chest tightness / nausea at some point during approximately half of my bike rides (4 of 7 rides during this time period).  It has varied from vigorous rides like a spin class (once) to longer, leisurely rides (twice) to one fairly tough road ride (24 miles / several steep hills).  The other 3 times (again varied rides) everything was normal and I experienced no symptoms at all.  This reminds me of similar symptoms I experienced 9 years ago prior to my stent where it took a while to diagnose my blockage due to my other risk factors looking so good.  I am not an alarmist (as my records should show) but something seems off.  Let me know what you recommend at this juncture.     Thanks.     No F/Up visit scheduled    Last Dr. Park visit 1-26-21 - past medical history significant for stenting of his proximal left anterior descending artery due to unstable angina in 2012.  He also has hypercholesterolemia and ischemic cardiomyopathy, mildly dilated ascending aorta.

## 2021-07-23 NOTE — TELEPHONE ENCOUNTER
Adonay Delgado MD Theis, Marcie J RN  Cc: KIRILL Eddy [facility] Heart Team 2  Caller: Unspecified (Today, 11:28 AM)  Have him start Norvasc 2.5 mg daily and have his see Aybike or an GERONIMO next week.     1430 attempted to contact patient to discuss Dr. Delgado's recommendation to start norvasc 2.5mg daiy and see GERONIMO next week. Left message for patient to call back.    Spoke with scheduling - patient can be offered GERONIMO visit with Junie Matthew on Tuesday, July 27 @ 10:20 AM.

## 2021-07-27 ENCOUNTER — VIRTUAL VISIT (OUTPATIENT)
Dept: CARDIOLOGY | Facility: CLINIC | Age: 61
End: 2021-07-27
Attending: INTERNAL MEDICINE
Payer: COMMERCIAL

## 2021-07-27 ENCOUNTER — HOSPITAL ENCOUNTER (OUTPATIENT)
Facility: CLINIC | Age: 61
Setting detail: OBSERVATION
Discharge: HOME | DRG: 246 | End: 2021-07-28
Attending: PHYSICIAN ASSISTANT | Admitting: HOSPITALIST
Payer: COMMERCIAL

## 2021-07-27 ENCOUNTER — APPOINTMENT (OUTPATIENT)
Dept: GENERAL RADIOLOGY | Facility: CLINIC | Age: 61
DRG: 246 | End: 2021-07-27
Attending: PHYSICIAN ASSISTANT
Payer: COMMERCIAL

## 2021-07-27 DIAGNOSIS — R07.9 CHEST PAIN: ICD-10-CM

## 2021-07-27 DIAGNOSIS — I25.110 CORONARY ARTERY DISEASE INVOLVING NATIVE CORONARY ARTERY OF NATIVE HEART WITH UNSTABLE ANGINA PECTORIS (H): ICD-10-CM

## 2021-07-27 DIAGNOSIS — Z98.890 HX OF CARDIAC CATHETERIZATION: ICD-10-CM

## 2021-07-27 DIAGNOSIS — R94.39 ABNORMAL CARDIOVASCULAR STRESS TEST: ICD-10-CM

## 2021-07-27 DIAGNOSIS — I25.10 CORONARY ARTERY DISEASE INVOLVING NATIVE CORONARY ARTERY OF NATIVE HEART WITHOUT ANGINA PECTORIS: Primary | ICD-10-CM

## 2021-07-27 LAB
ANION GAP SERPL CALCULATED.3IONS-SCNC: 3 MMOL/L (ref 3–14)
ATRIAL RATE - MUSE: 62 BPM
ATRIAL RATE - MUSE: 71 BPM
BASOPHILS # BLD AUTO: 0 10E3/UL (ref 0–0.2)
BASOPHILS NFR BLD AUTO: 1 %
BUN SERPL-MCNC: 21 MG/DL (ref 7–30)
CALCIUM SERPL-MCNC: 9.1 MG/DL (ref 8.5–10.1)
CHLORIDE BLD-SCNC: 107 MMOL/L (ref 94–109)
CO2 SERPL-SCNC: 30 MMOL/L (ref 20–32)
CREAT SERPL-MCNC: 0.77 MG/DL (ref 0.66–1.25)
DIASTOLIC BLOOD PRESSURE - MUSE: NORMAL MMHG
DIASTOLIC BLOOD PRESSURE - MUSE: NORMAL MMHG
EOSINOPHIL # BLD AUTO: 0.1 10E3/UL (ref 0–0.7)
EOSINOPHIL NFR BLD AUTO: 1 %
ERYTHROCYTE [DISTWIDTH] IN BLOOD BY AUTOMATED COUNT: 13.2 % (ref 10–15)
GFR SERPL CREATININE-BSD FRML MDRD: >90 ML/MIN/1.73M2
GLUCOSE BLD-MCNC: 96 MG/DL (ref 70–99)
HCT VFR BLD AUTO: 45.1 % (ref 40–53)
HGB BLD-MCNC: 15.3 G/DL (ref 13.3–17.7)
IMM GRANULOCYTES # BLD: 0 10E3/UL
IMM GRANULOCYTES NFR BLD: 0 %
INTERPRETATION ECG - MUSE: NORMAL
INTERPRETATION ECG - MUSE: NORMAL
LYMPHOCYTES # BLD AUTO: 1.1 10E3/UL (ref 0.8–5.3)
LYMPHOCYTES NFR BLD AUTO: 19 %
MCH RBC QN AUTO: 32.1 PG (ref 26.5–33)
MCHC RBC AUTO-ENTMCNC: 33.9 G/DL (ref 31.5–36.5)
MCV RBC AUTO: 95 FL (ref 78–100)
MONOCYTES # BLD AUTO: 0.4 10E3/UL (ref 0–1.3)
MONOCYTES NFR BLD AUTO: 6 %
NEUTROPHILS # BLD AUTO: 4.2 10E3/UL (ref 1.6–8.3)
NEUTROPHILS NFR BLD AUTO: 73 %
NRBC # BLD AUTO: 0 10E3/UL
NRBC BLD AUTO-RTO: 0 /100
P AXIS - MUSE: 53 DEGREES
P AXIS - MUSE: 64 DEGREES
PLATELET # BLD AUTO: 187 10E3/UL (ref 150–450)
POTASSIUM BLD-SCNC: 3.9 MMOL/L (ref 3.4–5.3)
PR INTERVAL - MUSE: 164 MS
PR INTERVAL - MUSE: 168 MS
QRS DURATION - MUSE: 102 MS
QRS DURATION - MUSE: 96 MS
QT - MUSE: 396 MS
QT - MUSE: 416 MS
QTC - MUSE: 422 MS
QTC - MUSE: 430 MS
R AXIS - MUSE: 40 DEGREES
R AXIS - MUSE: 46 DEGREES
RBC # BLD AUTO: 4.77 10E6/UL (ref 4.4–5.9)
SARS-COV-2 RNA RESP QL NAA+PROBE: NEGATIVE
SODIUM SERPL-SCNC: 140 MMOL/L (ref 133–144)
SYSTOLIC BLOOD PRESSURE - MUSE: NORMAL MMHG
SYSTOLIC BLOOD PRESSURE - MUSE: NORMAL MMHG
T AXIS - MUSE: 47 DEGREES
T AXIS - MUSE: 50 DEGREES
TROPONIN I SERPL-MCNC: <0.015 UG/L (ref 0–0.04)
TROPONIN I SERPL-MCNC: <0.015 UG/L (ref 0–0.04)
VENTRICULAR RATE- MUSE: 62 BPM
VENTRICULAR RATE- MUSE: 71 BPM
WBC # BLD AUTO: 5.8 10E3/UL (ref 4–11)

## 2021-07-27 PROCEDURE — 85025 COMPLETE CBC W/AUTO DIFF WBC: CPT | Performed by: PHYSICIAN ASSISTANT

## 2021-07-27 PROCEDURE — G0378 HOSPITAL OBSERVATION PER HR: HCPCS

## 2021-07-27 PROCEDURE — 250N000013 HC RX MED GY IP 250 OP 250 PS 637: Performed by: PHYSICIAN ASSISTANT

## 2021-07-27 PROCEDURE — 36415 COLL VENOUS BLD VENIPUNCTURE: CPT | Performed by: HOSPITALIST

## 2021-07-27 PROCEDURE — 36415 COLL VENOUS BLD VENIPUNCTURE: CPT | Performed by: PHYSICIAN ASSISTANT

## 2021-07-27 PROCEDURE — 93005 ELECTROCARDIOGRAM TRACING: CPT

## 2021-07-27 PROCEDURE — 82435 ASSAY OF BLOOD CHLORIDE: CPT | Performed by: PHYSICIAN ASSISTANT

## 2021-07-27 PROCEDURE — 71046 X-RAY EXAM CHEST 2 VIEWS: CPT

## 2021-07-27 PROCEDURE — 99285 EMERGENCY DEPT VISIT HI MDM: CPT | Mod: 25

## 2021-07-27 PROCEDURE — 84484 ASSAY OF TROPONIN QUANT: CPT | Performed by: PHYSICIAN ASSISTANT

## 2021-07-27 PROCEDURE — C9803 HOPD COVID-19 SPEC COLLECT: HCPCS

## 2021-07-27 PROCEDURE — 999N000054 HC STATISTIC EKG NON-CHARGEABLE

## 2021-07-27 PROCEDURE — 84484 ASSAY OF TROPONIN QUANT: CPT | Mod: 91 | Performed by: HOSPITALIST

## 2021-07-27 PROCEDURE — 99220 PR INITIAL OBSERVATION CARE,LEVEL III: CPT | Performed by: HOSPITALIST

## 2021-07-27 PROCEDURE — 99215 OFFICE O/P EST HI 40 MIN: CPT | Mod: GT | Performed by: PHYSICIAN ASSISTANT

## 2021-07-27 PROCEDURE — 87635 SARS-COV-2 COVID-19 AMP PRB: CPT | Performed by: PHYSICIAN ASSISTANT

## 2021-07-27 PROCEDURE — 82374 ASSAY BLOOD CARBON DIOXIDE: CPT | Performed by: PHYSICIAN ASSISTANT

## 2021-07-27 RX ORDER — NITROGLYCERIN 0.4 MG/1
0.4 TABLET SUBLINGUAL EVERY 5 MIN PRN
Status: DISCONTINUED | OUTPATIENT
Start: 2021-07-27 | End: 2021-07-28

## 2021-07-27 RX ORDER — NITROGLYCERIN 0.4 MG/1
0.4 TABLET SUBLINGUAL EVERY 5 MIN PRN
Status: DISCONTINUED | OUTPATIENT
Start: 2021-07-27 | End: 2021-07-27

## 2021-07-27 RX ORDER — ASPIRIN 81 MG/1
81 TABLET ORAL DAILY
Status: DISCONTINUED | OUTPATIENT
Start: 2021-07-28 | End: 2021-07-27

## 2021-07-27 RX ORDER — HYDROCORTISONE VALERATE CREAM 2 MG/G
CREAM TOPICAL 2 TIMES DAILY PRN
Status: DISCONTINUED | OUTPATIENT
Start: 2021-07-27 | End: 2021-07-28 | Stop reason: HOSPADM

## 2021-07-27 RX ORDER — LORATADINE 10 MG/1
10 TABLET ORAL DAILY
Status: DISCONTINUED | OUTPATIENT
Start: 2021-07-28 | End: 2021-07-28 | Stop reason: HOSPADM

## 2021-07-27 RX ORDER — MAGNESIUM HYDROXIDE/ALUMINUM HYDROXICE/SIMETHICONE 120; 1200; 1200 MG/30ML; MG/30ML; MG/30ML
30 SUSPENSION ORAL EVERY 4 HOURS PRN
Status: DISCONTINUED | OUTPATIENT
Start: 2021-07-27 | End: 2021-07-28 | Stop reason: HOSPADM

## 2021-07-27 RX ORDER — ASPIRIN 81 MG/1
162 TABLET, CHEWABLE ORAL ONCE
Status: DISCONTINUED | OUTPATIENT
Start: 2021-07-27 | End: 2021-07-27 | Stop reason: CLARIF

## 2021-07-27 RX ORDER — AMLODIPINE BESYLATE 2.5 MG/1
2.5 TABLET ORAL DAILY
Status: DISCONTINUED | OUTPATIENT
Start: 2021-07-28 | End: 2021-07-28

## 2021-07-27 RX ORDER — LOSARTAN POTASSIUM 100 MG/1
100 TABLET ORAL DAILY
Status: DISCONTINUED | OUTPATIENT
Start: 2021-07-28 | End: 2021-07-28

## 2021-07-27 RX ORDER — ATORVASTATIN CALCIUM 10 MG/1
10 TABLET, FILM COATED ORAL EVERY OTHER DAY
Status: DISCONTINUED | OUTPATIENT
Start: 2021-07-29 | End: 2021-07-28 | Stop reason: HOSPADM

## 2021-07-27 RX ORDER — ASPIRIN 81 MG/1
81 TABLET, CHEWABLE ORAL ONCE
Status: COMPLETED | OUTPATIENT
Start: 2021-07-27 | End: 2021-07-27

## 2021-07-27 RX ORDER — NAPROXEN SODIUM 220 MG
TABLET ORAL 2 TIMES DAILY PRN
Status: ON HOLD | COMMUNITY
End: 2021-07-31

## 2021-07-27 RX ORDER — NITROGLYCERIN 0.4 MG/1
0.4 TABLET SUBLINGUAL EVERY 5 MIN PRN
Qty: 30 TABLET | Refills: 0 | Status: SHIPPED | OUTPATIENT
Start: 2021-07-27

## 2021-07-27 RX ORDER — ASPIRIN 81 MG/1
81 TABLET ORAL DAILY
Status: DISCONTINUED | OUTPATIENT
Start: 2021-07-28 | End: 2021-07-28

## 2021-07-27 RX ADMIN — NITROGLYCERIN 0.4 MG: 0.4 TABLET SUBLINGUAL at 15:06

## 2021-07-27 RX ADMIN — ASPIRIN 81 MG CHEWABLE TABLET 81 MG: 81 TABLET CHEWABLE at 15:06

## 2021-07-27 ASSESSMENT — ENCOUNTER SYMPTOMS
VOMITING: 0
BLOOD IN STOOL: 0
SHORTNESS OF BREATH: 1
CHEST TIGHTNESS: 1
CHILLS: 0
FEVER: 0
NAUSEA: 1

## 2021-07-27 ASSESSMENT — MIFFLIN-ST. JEOR: SCORE: 1608.24

## 2021-07-27 NOTE — ED NOTES
[facility]  ED Nurse Handoff Report    ED Chief complaint: Chest Pain      ED Diagnosis:   Final diagnoses:   Chest pain   Abnormal cardiovascular stress test   Hx of cardiac catheterization       Code Status: To be addressed by admitting MD.     Allergies:   Allergies   Allergen Reactions     Seasonal Allergies      Hydrocodone-Acetaminophen Other (See Comments)     FEELS WIRED  FEELS WIRED  Hyperactive, insomnia.        Molds [employer] Smuts        Patient Story: Hx of cardiac cath, abnormal stress test. Presents to ED for evaluation of chest pain for the last three weeks.   Focused Assessment:  Plan for angiogram tomorrow. Pt is alert and oriented x4. NSR on continuous cardiac monitoring. CP resolved following nitroglycerin.     Treatments and/or interventions provided:   Labs Ordered and Resulted from Time of ED Arrival Up to the Time of Departure from the ED   BASIC METABOLIC PANEL - Normal   TROPONIN I - Normal   CBC WITH PLATELETS AND DIFFERENTIAL   CARDIAC CONTINUOUS MONITORING   PULSE OXIMETRY NURSING   PERIPHERAL IV CATHETER   COVID-19 VIRUS (CORONAVIRUS) BY PCR   CBC WITH PLATELETS & DIFFERENTIAL    Narrative:     The following orders were created for panel order CBC with platelets differential.  Procedure                               Abnormality         Status                     ---------                               -----------         ------                     CBC with platelets and d...[[id number]]                      Final result                 Please view results for these tests on the individual orders.     XR Chest 2 Views   Final Result   IMPRESSION: PA and lateral views of the chest were obtained.   Cardiomediastinal silhouette is within normal limits. No suspicious   focal pulmonary opacities. No significant pleural effusion or   pneumothorax.      LINDA BUI MD            SYSTEM ID:  [id number]          Patient's response to treatments and/or interventions: Tolerated  appropriately     To be done/followed up on inpatient unit:  Further evaluation with cardiology.     Does this patient have any cognitive concerns?: Alert and oriented x4.     Activity level - Baseline/Home:  Independent  Activity Level - Current:   Independent    Patient's Preferred language: English   Needed?: No    Isolation: None  Infection: Not Applicable  Patient tested for COVID 19 prior to admission: YES  Bariatric?: No    Vital Signs:   Vitals:    07/27/21 1645 07/27/21 1700 07/27/21 1715 07/27/21 1730   BP: 119/67 118/72 111/71 121/72   Pulse: 66 67 68 68   Resp: 24 19 11 18   Temp:       TempSrc:       SpO2: 100% 100% 99% 99%   Weight:       Height:           Cardiac Rhythm:Cardiac Rhythm: Normal sinus rhythm    Was the PSS-3 completed:   Yes  What interventions are required if any?               Family Comments: Pt to update family from ED.   OBS brochure/video discussed/provided to patient/family: Yes              Name of person given brochure if not patient: NA              Relationship to patient: NA    For the majority of the shift this patient's behavior was Green.   Behavioral interventions performed were frequent rounding.    ED NURSE PHONE NUMBER: [id number]

## 2021-07-27 NOTE — LETTER
"7/27/2021    Bharathi Chowdary MD  [address]  [facility] [address]    RE: Chris Barriga       Dear Colleague,    I had the pleasure of seeing Chris Barriga in the [facility].    Vitals - Patient Reported  Systolic (Patient Reported): 136  Diastolic (Patient Reported): 87  Weight (Patient Reported): 77.1 kg (170 lb)  Height (Patient Reported): 180.3 cm (5' 11\")  BMI (Based on Pt Reported Ht/Wt): 23.71  Pulse (Patient Reported): 72    Review Of Systems  Skin: NEGATIVE  Eyes:Ears/Nose/Throat: NEGATIVE  Respiratory: NEGATIVE  Cardiovascular: Palpations, chest pain  Gastrointestinal: NEGATIVE  Genitourinary:NEGATIVE   Musculoskeletal: Arthritis, neck pain  Neurologic: NEGATIVE  Psychiatric: NEGATIVE  Hematologic/Lymphatic/Immunologic: Allergies  Endocrine:  NEGATIVE    Shivam Pearson NREMT      Chava is a 60 year old who is being evaluated via a billable video visit.      How would you like to obtain your AVS? MyChart  If the video visit is dropped, the invitation should be resent by: Text to cell phone: [phone]  Will anyone else be joining your video visit? No      Video Start Time: 10:24 AM  Video-Visit Details    Type of service:  Video Visit    Video End Time:10:40 AM  Additional 15 minutes spent on chart review, documentation, and review with provider.     Originating Location (pt. Location): Home    Distant Location (provider location):  [facility] [place]     Platform used for Video Visit: [facility]      Cardiology Clinic Progress Note    Chris Barriga MRN# [id number]   YOB: 1960 Age: 60 year old   Primary cardiologist: Dr. Park         Assessment and Plan:     In summary, Chris Barriga presents today for follow up on recent stress testing.     1. Chest discomfort concerning for unstable angina, with short episode of resting pain last evening.  2. Abnormal stress echocardiogram, " "concerning for ischemia in the OM/diagonal distribution.  Excellent exercise tolerance, completing 15 minutes with appropriate blood pressure response.  Positive chest pain at peak exercise.  3. History of PCI to proximal LAD in 2012.  Severe ostial small artery OM1 lesion left alone.  Mild disease elsewhere.  4. Hyperlipidemia, very well controlled.    Plan:  -Left heart catheterization, coronary angiogram with possible PCI recommended. Spoke with Dr. Morales over the phone, who is in the cath lab today. Due to lab schedule, we will not be able to cath him electively this afternoon and therefore, he recommends he go through the ED for admission and plan for cath tomorrow. Chava and his wife are in agreement with this plan and will proceed to [facility] ED now. ED called with update.   Risks and benefits of left heart catheterization and coronary angiogram were discussed with the patient in detail. 0.1-0.3% (for diagnostic angio) and 1-2% (for PCI)  risk of stroke, MI, death, emergent bypass for diagnostic angio, risk of contrast induced allergic reaction, renal dysfunction, vascular complications were discussed. Patient understands and wishes to proceed. The patient would be an appropriate candidate for DAPT, if required.   -Continue aspirin, statin.  Going to defer beta-blocker therapy for the moment, as he was just placed on amlodipine several days ago by Dr. Delgado, this can be addressed by inpatient team.   -Follow-up with me 1 to 2 weeks post procedure.        History of Presenting Illness:      Chris Barriga is a pleasant 60 year old patient who presents today to follow up on stress testing.    He has a history of CAD, NSTEMI in 2012, s/p PCI to prox LAD. There was otherwise an 80-90% ostial small OM1 lesion which was managed medically, and very mild disease elsewhere. Since that time, he has been described as a \"model patient,\" compliant with routine exercise, a heart-healthy diet, and medications.   He saw " Dr. Park last in January, and was feeling quite well. Fasting lipid profile is the best he has ever had, between his increase in exercise and weight loss.  Total cholesterol is now down to 147, HDL is up to 91, LDL is 35 and triglycerides are 107.  BMP at that time was WNL.    On 7/5, Chava sent a [employer] message with some concerns:  Over the past 10 days or so, I have had some slight chest tightness / nausea at some point during approximately half of my bike rides (4 of 7 rides during this time period).  It has varied from vigorous rides like a spin class (once) to longer, leisurely rides (twice) to one fairly tough road ride (24 miles / several steep hills).  The other 3 times (again varied rides) everything was normal and I experienced no symptoms at all.  This reminds me of similar symptoms I experienced 9 years ago prior to my stent where it took a while to diagnose my blockage due to my other risk factors looking so good.    A stress echo was ordered. This took place on 7/23 and showed distal anterolateral ischemia in a diagonal / OM distribution post stress with chest discomfort during the test (at peak exercise). He exercised 15 METs without ischemic changes on EKG. BP response to exercise was normal.     This was reviewed with Dr. Delgado in Dr. Park's absence, who recommended initiation of amlodipine 2.5 mg daily, and he was scheduled to see me today for further review.     Today, I am meeting Chava for the first time. He is very pleasant. Unfortunately, he tells me that his symptoms have progressed over the past 7-10 days. He's continued to exercise mildly and went for a very liesurely bikeride on Sunday with waxing and waning chest discomfort throughout the ride. He also had very mild discomfort at the end of his walk earlier today. Additionally, he's had two episodes of resting discomfort. About ten days ago, he awoke with mild discomfort. Last evening he had another while seated and watching the  "olympics. Describes this as a \"flutter sensation\" lasting about 5 minutes in duration with spontaneous resolution. He's used SL NTG twice in the past ten days, after \"not feeling right\" following completion of exercise. Patient denies shortness of breath, PND, orthopnea, edema, near syncope or syncope.  He has no issues with abnormal bruising or bleeding, and no upcoming necessary procedures or surgeries. He ate breakfast about an hour ago.          Review of Systems:     12-pt ROS is negative except for as noted in the HPI.          Physical Exam:     Vitals: There were no vitals taken for this visit.  Wt Readings from Last 10 Encounters:   05/29/19 82.6 kg (182 lb 3.2 oz)   11/13/18 80.2 kg (176 lb 12.8 oz)   10/06/17 81.6 kg (179 lb 12.8 oz)   09/06/17 81 kg (178 lb 8 oz)   05/22/17 77.1 kg (170 lb)   02/17/17 79.4 kg (175 lb)   04/23/14 80.6 kg (177 lb 9.6 oz)   12/30/13 78.6 kg (173 lb 3.2 oz)   12/24/13 74.8 kg (165 lb)   08/21/12 78 kg (172 lb)       Constitutional:  Patient is pleasant, alert, cooperative, and in NAD.  HEENT:  NCAT. PERRLA. EOM's intact.   Neck:  CVP appears normal.  Pulmonary: Normal respiratory effort.  Skin:  No rashes or lesions appreciated.   Neurological:  No gross motor or sensory deficits.   Psych: Appropriate affect.        Data:     Labs reviewed:  Recent Labs   Lab Test 01/21/21  0824 09/17/20  0000 05/29/19  0929 10/17/18  0722   LDL 35 71 71 38   HDL 91 83 80 65   NHDL 56  --  89 55   CHOL 147 166 169 120   TRIG 107 61 88 83       Lab Results   Component Value Date    WBC 5.5 08/21/2017    RBC 4.81 08/21/2017    HGB 15.4 08/21/2017    HCT 44.3 08/21/2017    MCV 92 08/21/2017    MCH 32 08/21/2017    MCHC 34.8 08/21/2017    RDW 13 08/21/2017     08/21/2017       Lab Results   Component Value Date     01/21/2021    POTASSIUM 4.2 01/21/2021    CHLORIDE 106 01/21/2021    CO2 30 01/21/2021    ANIONGAP 2 (L) 01/21/2021    GLC 97 01/21/2021    BUN 20 01/21/2021    CR 0.90 " 01/21/2021    GFRESTIMATED >90 01/21/2021    GFRESTBLACK >90 01/21/2021    DANY 8.9 01/21/2021      Lab Results   Component Value Date    AST 21 09/04/2018    ALT 24 09/17/2020       No results found for: A1C    Lab Results   Component Value Date    INR 0.89 05/25/2012           Problem List:     Patient Active Problem List   Diagnosis     Pain in joint, lower leg     Other postprocedural status(V45.89)     Iliac artery aneurysm, left (H)     Coronary artery disease involving native coronary artery of native heart without angina pectoris     Benign essential hypertension     Pure hypercholesterolemia     Aortic root dilation (H)     Ischemic cardiomyopathy           Medications:     Current Outpatient Medications   Medication Sig Dispense Refill     amLODIPine (NORVASC) 5 MG tablet Take 2.5mg (1/2 tab) daily 20 tablet 1     ASPIRIN EC PO Take 81 mg by mouth daily       atorvastatin (LIPITOR) 10 MG tablet Take 1 tablet (10 mg) by mouth every other day 45 tablet 2     fish oil-omega-3 fatty acids (FISH OIL) 1000 MG capsule Take 1 g by mouth daily        Flaxseed, Linseed, (FLAX SEED OIL) 1000 MG capsule Take 1 capsule by mouth daily       glucosamine-chondroitin 500-400 MG CAPS Take 2 capsules by mouth daily        hydrocortisone (WESTCORT) 0.2 % cream Apply topically 2 times daily as needed        loratadine (CLARITIN) 10 MG capsule Take 10 mg by mouth daily       losartan-hydrochlorothiazide (HYZAAR) 100-25 MG per tablet Take 1 tablet by mouth daily       Lysine 500 MG TABS Take 1 tablet by mouth daily.       magnesium 500 MG TABS Take 500 mg by mouth every evening       MINOCYCLINE HCL PO Take 100 mg by mouth daily as needed (acne)        Multiple Vitamin (MULTI-VITAMIN) per tablet Take 1 tablet by mouth daily.       nitroglycerin (NITROSTAT) 0.4 MG SL tablet Place 1 tablet under the tongue every 5 minutes as needed for chest pain for 3 doses. Administer every 5 minutes as needed.  Maximum 3 doses in 15 minutes.  (Patient not taking: Reported on 1/26/2021) 75 tablet 3     Potassium Gluconate 595 (99 K) MG TABS Take 1 tablet by mouth daily             Past Medical History:     Past Medical History:   Diagnosis Date     Adenoma of ascending colon      Aortic root dilation (H)      Chest pain      Coronary artery disease     5/2012: Stent to proximal LAD for unstable angina. Proximal left circumflex 30-40%. EF 60%     HTN (hypertension)      Hyperlipidaemia      Iliac artery aneurysm, left (H)      Past Surgical History:   Procedure Laterality Date     APPENDECTOMY OPEN       ARTHROSCOPY KNEE BILATERAL      both knees     ARTHROSCOPY SHOULDER      left     C ORAL SURGERY PROCEDURE       CARDIAC SURGERY      stent x 1     COLONOSCOPY N/A 2/17/2017    Procedure: COLONOSCOPY;  Surgeon: Junie Grier MD;  Location:  OR     [facility]  [facility] [place]     COLONOSCOPY  05/22/2017    Dr. Grier [facility]     COLONOSCOPY N/A 5/29/2018    Procedure: COLONOSCOPY;  colonoscopy (crsal);  Surgeon: Junie Grier MD;  Location: [facility]     LAPAROSCOPIC HERNIORRHAPHY INGUINAL  12/30/2013    Procedure: LAPAROSCOPIC HERNIORRHAPHY INGUINAL;  LAPAROSCOPIC RIGHT INGUINAL HERNIA REPAIR WITH MESH;  Surgeon: Reji Pizano MD;  Location: [facility]     ORTHOPEDIC SURGERY      scope of both shoulders     VASECTOMY       Family History   Problem Relation Age of Onset     Breast Cancer Mother      Cerebrovascular Disease Father      Breast Cancer Maternal Grandmother      Breast Cancer Son      Colon Cancer No family hx of      Social History     Socioeconomic History     Marital status:      Spouse name: Not on file     Number of children: Not on file     Years of education: Not on file     Highest education level: Not on file   Occupational History     Not on file   Tobacco Use     Smoking status: Never Smoker     Smokeless tobacco: Never Used   Substance and Sexual Activity     Alcohol use: Yes     Comment: 2-3 per  week     Drug use: No     Sexual activity: Not on file   Other Topics Concern     Parent/sibling w/ CABG, MI or angioplasty before 65F 55M? Not Asked   Social History Narrative     Not on file     Social Determinants of Health     Financial Resource Strain:      Difficulty of Paying Living Expenses:    Food Insecurity:      Worried About Running Out of Food in the Last Year:      Ran Out of Food in the Last Year:    Transportation Needs:      Lack of Transportation (Medical):      Lack of Transportation (Non-Medical):    Physical Activity:      Days of Exercise per Week:      Minutes of Exercise per Session:    Stress:      Feeling of Stress :    Social Connections:      Frequency of Communication with Friends and Family:      Frequency of Social Gatherings with Friends and Family:      Attends Yarsanism Services:      Active Member of Clubs or Organizations:      Attends Club or Organization Meetings:      Marital Status:    Intimate Partner Violence:      Fear of Current or Ex-Partner:      Emotionally Abused:      Physically Abused:      Sexually Abused:            Allergies:   Seasonal allergies, Hydrocodone-acetaminophen, and Molds & smuts      HIPOLITO Eisenberg [facility]  Pager: [phone]        Thank you for allowing me to participate in the care of your patient.      Sincerely,     HIPOLITO Eisenberg [facility]  cc:   Adonay Delgado MD  [address]  [place] [id number]

## 2021-07-27 NOTE — H&P
[facility]    History and Physical - Hospitalist Service       Date of Admission:  7/27/2021    Assessment & Plan      Chris Barriga is a 60 year old male admitted on 7/27/2021.     Chest pain concerning for unstable angina  History of coronary artery disease with drug-eluting stent in the past  Positive stress test on 7/26 with the distal anteriolateral ischemia in the diagonal/OM distribution  Spoke with his outpatient cardiologist today who advised presented to the emergency department for further evaluation and plan for an angiogram in the morning  ED physician did speak to cardiologist on call who advised to hold on heparin for now, can start if develops some chest pain  Received an aspirin  Plan  - register to observation with serial troponins, echocardiogram  - cardiology consultation, planning on angiogram in the AM  - continue aspirin  - if develops chest pain, bumps troponin would start heparin  - continue losartan  - continue atorvastatin  - obtain a1c and lipid panel    History of hypertension  -Holding hydrochlorothiazide in anticipation of angiogram in the morning       Diet: NPO for Medical/Clinical Reasons Except for: Meds    DVT Prophylaxis: Pneumatic Compression Devices  Long Catheter: Not present  Central Lines: None  Code Status:   full code    Risk Factors Present on Admission              # Platelet Defect: home medication list includes an antiplatelet medication      Disposition Plan   Expected discharge:  recommended to prior living arrangement once cardiac workup complete       The patient's care was discussed with the Patient.    Jl Toussaint DO  [facility]  Securely message with the Vocera Web Console (learn more here)  Text page via [email or web address] Paging/Directory      ______________________________________________________________________    Chief Complaint   Chest pain    History is obtained from the patient    History of  Present Illness   Chris Barriga is a 60 year old male with past medical history of coronary artery disease status post drug-eluting stent 9 years ago, hypertension who presents with 3 weeks of intermittent chest pain that occurs during exertion.  He reports that for the past 3 weeks he started to have some chest pain described as substernal.  This occurred generally when he would exert himself.  It would tend to go away.  He was set up with an outpatient stress test which turned positive.  On a telemedicine visit today with his cardiologist.  He was advised to go to the emergency department for angiogram the next day.    In the emergency department currently the patient is having minimal if any chest pain approximately one half out of 10.  Initial EKG was nonischemic and troponin was negative.  The admitting physician assistant did discuss with the on-call cardiologist who recommended against heparinization at this point.    The patient currently appears comfortable and denies acute complaints.  No history of syncope or leg swelling.    Review of Systems    The 10 point Review of Systems is negative other than noted in the HPI or here.     Past Medical History    I have reviewed this patient's medical history and updated it with pertinent information if needed.   Past Medical History:   Diagnosis Date     Adenoma of ascending colon      Aortic root dilation (H)      Chest pain      Coronary artery disease     5/2012: Stent to proximal LAD for unstable angina. Proximal left circumflex 30-40%. EF 60%     HTN (hypertension)      Hyperlipidaemia      Iliac artery aneurysm, left (H)        Past Surgical History   I have reviewed this patient's surgical history and updated it with pertinent information if needed.  Past Surgical History:   Procedure Laterality Date     APPENDECTOMY OPEN       ARTHROSCOPY KNEE BILATERAL      both knees     ARTHROSCOPY SHOULDER      left     C ORAL SURGERY PROCEDURE       CARDIAC  SURGERY      stent x 1     COLONOSCOPY N/A 2/17/2017    Procedure: COLONOSCOPY;  Surgeon: Junie Grier MD;  Location:  OR     COL[facility]OS[place]  [place]2016    [facility] [place]     COLONOSCOPY  05/22/2017    Dr. Grier [facility]     COLONOSCOPY N/A 5/29/2018    Procedure: COLONOSCOPY;  colonoscopy (crsal);  Surgeon: Junie Grier MD;  Location: [facility]     LAPAROSCOPIC HERNIORRHAPHY INGUINAL  12/30/2013    Procedure: LAPAROSCOPIC HERNIORRHAPHY INGUINAL;  LAPAROSCOPIC RIGHT INGUINAL HERNIA REPAIR WITH MESH;  Surgeon: Reji Pizano MD;  Location: [facility]     ORTHOPEDIC SURGERY      scope of both shoulders     VASECTOMY         Social History   I have reviewed this patient's social history and updated it with pertinent information if needed.  Social History     Tobacco Use     Smoking status: Never Smoker     Smokeless tobacco: Never Used   Substance Use Topics     Alcohol use: Yes     Comment: 2-3 per week     Drug use: No       Family History   I have reviewed this patient's family history and updated it with pertinent information if needed.  Family History   Problem Relation Age of Onset     Breast Cancer Mother      Cerebrovascular Disease Father      Breast Cancer Maternal Grandmother      Breast Cancer Son      Colon Cancer No family hx of        Prior to Admission Medications   Prior to Admission Medications   Prescriptions Last Dose Informant Patient Reported? Taking?   ASPIRIN EC PO 7/27/2021 at Unknown time Self Yes Yes   Sig: Take 81 mg by mouth daily   Flaxseed, Linseed, (FLAX SEED OIL) 1000 MG capsule 7/26/2021 at Unknown time Self Yes Yes   Sig: Take 1 capsule by mouth daily   Lysine 500 MG TABS 7/26/2021 at Unknown time Self Yes Yes   Sig: Take 1 tablet by mouth daily.   MINOCYCLINE HCL PO 7/27/2021 at Unknown time Self Yes Yes   Sig: Take 100 mg by mouth daily as needed (acne)    Multiple Vitamin (MULTI-VITAMIN) per tablet 7/26/2021 at Unknown time Self Yes Yes   Sig: Take 1 tablet by mouth  daily.   Potassium Gluconate 595 (99 K) MG TABS 7/26/2021 at Unknown time Self Yes Yes   Sig: Take 1 tablet by mouth daily   amLODIPine (NORVASC) 5 MG tablet 7/27/2021 at Unknown time  No Yes   Sig: Take 2.5mg (1/2 tab) daily   atorvastatin (LIPITOR) 10 MG tablet 7/27/2021 at Unknown time  No Yes   Sig: Take 1 tablet (10 mg) by mouth every other day   glucosamine-chondroitin 500-400 MG CAPS 7/26/2021 at Unknown time Self Yes Yes   Sig: Take 2 capsules by mouth daily    hydrocortisone (WESTCORT) 0.2 % cream prn med Self Yes Yes   Sig: Apply topically 2 times daily as needed    loratadine (CLARITIN) 10 MG capsule 7/27/2021 at Unknown time  Yes Yes   Sig: Take 10 mg by mouth daily   losartan-hydrochlorothiazide (HYZAAR) 100-25 MG per tablet 7/27/2021 at Unknown time Self Yes Yes   Sig: Take 1 tablet by mouth daily   magnesium 500 MG TABS 7/26/2021 at Unknown time Self Yes Yes   Sig: Take 500 mg by mouth every evening   naproxen sodium (ANAPROX) 220 MG tablet prn med  Yes Yes   Sig: Take by mouth 2 times daily as needed for moderate pain   nitroGLYcerin (NITROSTAT) 0.4 MG sublingual tablet prn med  No Yes   Sig: Place 1 tablet (0.4 mg) under the tongue every 5 minutes as needed for chest pain Administer every 5 minutes as needed.  Maximum 3 doses in 15 minutes.      Facility-Administered Medications: None     Allergies   Allergies   Allergen Reactions     Seasonal Allergies      Hydrocodone-Acetaminophen Other (See Comments)     FEELS WIRED  FEELS WIRED  Hyperactive, insomnia.        Molds & Smuts        Physical Exam   Vital Signs: Temp: 98.6  F (37  C) Temp src: Temporal BP: 121/72 Pulse: 68   Resp: 18 SpO2: 99 % O2 Device: None (Room air)    Weight: 170 lbs 0 oz    Physical Exam  Constitutional:       Appearance: He is normal weight.   HENT:      Head: Normocephalic and atraumatic.      Mouth/Throat:      Mouth: Mucous membranes are moist.      Pharynx: Oropharynx is clear.   Eyes:      Conjunctiva/sclera:  Conjunctivae normal.      Pupils: Pupils are equal, round, and reactive to light.   Cardiovascular:      Rate and Rhythm: Normal rate and regular rhythm.      Pulses: Normal pulses.      Heart sounds: Normal heart sounds.   Pulmonary:      Effort: Pulmonary effort is normal.      Breath sounds: Normal breath sounds.   Abdominal:      General: Abdomen is flat. Bowel sounds are normal.      Palpations: Abdomen is soft.   Skin:     General: Skin is warm and dry.      Capillary Refill: Capillary refill takes less than 2 seconds.   Neurological:      General: No focal deficit present.      Mental Status: He is alert and oriented to person, place, and time.   Psychiatric:         Mood and Affect: Mood normal.         Behavior: Behavior normal.         Thought Content: Thought content normal.         Judgment: Judgment normal.           Data   Data reviewed today: I reviewed all medications, new labs and imaging results over the last 24 hours. I personally reviewed  EKG: Normal sinus rhythm  Chest x-ray: Clear    Recent Labs   Lab 07/27/21  1503   WBC 5.8   HGB 15.3   MCV 95         POTASSIUM 3.9   CHLORIDE 107   CO2 30   BUN 21   CR 0.77   ANIONGAP 3   DANY 9.1   GLC 96   TROPONIN <0.015     Most Recent 3 CBC's:Recent Labs   Lab Test 07/27/21  1503 08/21/17  0000 02/17/17  1100   WBC 5.8 5.5  --    HGB 15.3 15.4 16.9   MCV 95 92  --     142  --      Most Recent 3 BMP's:Recent Labs   Lab Test 07/27/21  1503 01/21/21  0824 09/17/20  0000 09/04/18  0000    138 140 138   POTASSIUM 3.9 4.2 4.2 3.9   CHLORIDE 107 106 104 103   CO2 30 30  --  28   BUN 21 20 21 21   CR 0.77 0.90 0.85 0.86   ANIONGAP 3 2* 30 7   DANY 9.1 8.9 9.5 9.7   GLC 96 97 91 97     Most Recent 2 LFT's:Recent Labs   Lab Test 09/17/20  0000 09/04/18  0000 08/21/17  0000   AST  --  21 23   ALT 24 27 25   ALKPHOS  --  67 84   BILITOTAL  --  1 0.8     Recent Results (from the past 24 hour(s))   XR Chest 2 Views    Narrative    CHEST TWO  VIEWS  7/27/2021 3:15 PM     HISTORY:  Chest pain.    COMPARISON: None available.      Impression    IMPRESSION: PA and lateral views of the chest were obtained.  Cardiomediastinal silhouette is within normal limits. No suspicious  focal pulmonary opacities. No significant pleural effusion or  pneumothorax.    LINDA BUI MD         SYSTEM ID:  [id number]

## 2021-07-27 NOTE — ED TRIAGE NOTES
Pt stated he has been having chest pain issues for the past three weeks, had video visit with primary and was told to come to the ED ( Provider's assistant to Laci)   Chest pressure  Sometimes and 8 sometimes a 1 - currently a 1. Due for an angiogram tomorrow

## 2021-07-27 NOTE — PHARMACY-ADMISSION MEDICATION HISTORY
Pharmacy Medication History  Admission medication history interview status for the 7/27/2021  admission is complete. See EPIC admission navigator for prior to admission medications     Location of Interview: Patient room  Medication history sources: Patient    Medication reconciliation completed by provider prior to medication history? No    Time spent in this activity: 10 minutes    Prior to Admission medications    Medication Sig Last Dose Taking? Auth Provider   amLODIPine (NORVASC) 5 MG tablet Take 2.5mg (1/2 tab) daily 7/27/2021 at Unknown time Yes Adonay Delgado MD   ASPIRIN EC PO Take 81 mg by mouth daily 7/27/2021 at Unknown time Yes Reported, Patient   atorvastatin (LIPITOR) 10 MG tablet Take 1 tablet (10 mg) by mouth every other day 7/27/2021 at Unknown time Yes Jan Park MD   Flaxseed, Linseed, (FLAX SEED OIL) 1000 MG capsule Take 1 capsule by mouth daily 7/26/2021 at Unknown time Yes Reported, Patient   glucosamine-chondroitin 500-400 MG CAPS Take 2 capsules by mouth daily  7/26/2021 at Unknown time Yes Reported, Patient   hydrocortisone (WESTCORT) 0.2 % cream Apply topically 2 times daily as needed  prn med Yes Reported, Patient   loratadine (CLARITIN) 10 MG capsule Take 10 mg by mouth daily 7/27/2021 at Unknown time Yes Reported, Patient   losartan-hydrochlorothiazide (HYZAAR) 100-25 MG per tablet Take 1 tablet by mouth daily 7/27/2021 at Unknown time Yes Reported, Patient   Lysine 500 MG TABS Take 1 tablet by mouth daily. 7/26/2021 at Unknown time Yes Reported, Patient   magnesium 500 MG TABS Take 500 mg by mouth every evening 7/26/2021 at Unknown time Yes Reported, Patient   MINOCYCLINE HCL PO Take 100 mg by mouth daily as needed (acne)  7/27/2021 at Unknown time Yes Reported, Patient   Multiple Vitamin (MULTI-VITAMIN) per tablet Take 1 tablet by mouth daily. 7/26/2021 at Unknown time Yes Reported, Patient   naproxen sodium (ANAPROX) 220 MG tablet Take by mouth 2 times daily as needed for  moderate pain prn med Yes Unknown, Entered By History   nitroGLYcerin (NITROSTAT) 0.4 MG sublingual tablet Place 1 tablet (0.4 mg) under the tongue every 5 minutes as needed for chest pain Administer every 5 minutes as needed.  Maximum 3 doses in 15 minutes. prn med Yes Junie Matthew PA-C   Potassium Gluconate 595 (99 K) MG TABS Take 1 tablet by mouth daily 7/26/2021 at Unknown time Yes Reported, Patient       The information provided in this note is only as accurate as the sources available at the time of update(s)   Fanta Muhammad, HankD

## 2021-07-27 NOTE — ED PROVIDER NOTES
History   Chief Complaint:  Chest Pain       HPI   Chris Barriga is a 60 year old male with history of CAD, hypertension, hyperlipidemia, aortic root dilation, left Iliac artery aneurysm, ischemic cardiomyopathy and S/P coronary stent placement, who presents with chest pain. The patient reports that he has been having chest tightness and nausea with exertion over the last month. He states these symptoms are similar to ones he had 9 years ago when he had an emergency stent placed. He spoke with his PCP and had an echocardiogram done on 7/23 which had abnormal results. He spoke with Cardiology today who recommended he come into the ED as he began noticing chest tightness when resting over the last few days. The plan discussed was that he would be scheduled for a left heart catheterization and angiogram tomorrow and would stay the night in the hospital for monitoring. In the ED he reports his pain is at a 2/10 severity and is described as a dull ache. He has not taken nitroglycerin today. He has taken his daily baby aspirin. He denies shortness of breath here. No fever, chills, nausea, vomiting, black or blood stool. No leg swelling.     Echo stress test results 7/23: Abnormal stress echo with distal anterolateral ischemia in a diagonal / OM distribution post stress with chest discomfort during the test but overall  good exercise tolerance. Clinical correlation advised.    Cardiology Clinic visit today:  A/P:  In summary, Chris Barriga presents today for follow up on recent stress testing.      1. Chest discomfort concerning for unstable angina, with short episode of resting pain last evening.  2. Abnormal stress echocardiogram, concerning for ischemia in the OM/diagonal distribution.  Excellent exercise tolerance, completing 15 minutes with appropriate blood pressure response.  Positive chest pain at peak exercise.  3. History of PCI to proximal LAD in 2012.  Severe ostial small artery OM1 lesion  left alone.  Mild disease elsewhere.  4. Hyperlipidemia, very well controlled.     Plan:  -Left heart catheterization, coronary angiogram with possible PCI recommended. Spoke with Dr. Morales over the phone, who is in the cath lab today. Due to lab schedule, we will not be able to cath him electively this afternoon and therefore, he recommends he go through the ED for admission and plan for cath tomorrow. Chava and his wife are in agreement with this plan and will proceed to [facility] ED now. ED called with update.      Review of Systems   Constitutional: Negative for chills and fever.   Respiratory: Positive for chest tightness and shortness of breath.    Cardiovascular: Positive for chest pain. Negative for leg swelling.   Gastrointestinal: Positive for nausea. Negative for blood in stool and vomiting.   All other systems reviewed and are negative.      Allergies:  Hydrocodone-Acetaminophen    Medications:  Norvasc  Aspirin 81 mg,   Lipitor  Claritin  Hyzaar  Lysine  Magnesium  Minocycline HCL  Nitroglycerin  Glucosamine-chondroitin     Past Medical History:    Adenoma of ascending colon  Aortic root dilation  Chest pain  CAD  Hypertension  Hyperlipidemia  Iliac artery aneurysm, left    Ischemic cardiomyopathy  Basal cell carcinoma    Past Surgical History:    Appendectomy    Arthroscopy knee, bilateral  Left shoulder arthroscopy  Coronary Stent placement x 1  Colonoscopy x 4  Laparoscopic herniorrhaphy inguinal  Orthopedic surgery  Vasectomy     Family History:    Breast cancer  Cerebrovascular disease    Social History:  The patient presents alone.   Cardiologist: Jan Park    Physical Exam     Patient Vitals for the past 24 hrs:   BP Temp Temp src Pulse Resp SpO2 Height Weight   07/27/21 1715 -- -- -- 68 11 99 % -- --   07/27/21 1700 118/72 -- -- 67 19 100 % -- --   07/27/21 1645 119/67 -- -- 66 24 100 % -- --   07/27/21 1630 122/75 -- -- 61 13 99 % -- --   07/27/21 1615 125/73 -- -- 70 10 99 % -- --   07/27/21  "1600 126/82 -- -- 64 17 99 % -- --   07/27/21 1545 121/81 -- -- 68 11 98 % -- --   07/27/21 1515 -- -- -- -- -- 98 % -- --   07/27/21 1510 -- -- -- 77 13 99 % -- --   07/27/21 1505 (!) 136/90 -- -- 70 12 99 % -- --   07/27/21 1214 (!) 148/82 98.6  F (37  C) Temporal 71 16 99 % 1.803 m (5' 11\") 77.1 kg (170 lb)       Physical Exam  Constitutional: Pleasant. Cooperative.   Eyes: Pupils equally round and reactive  HENT: Head is normal in appearance. Oropharynx is normal with moist mucus membranes.  Cardiovascular: Regular rate and rhythm and without murmurs.  Respiratory: Normal respiratory effort, lungs are clear bilaterally.  GI: Abdomen is soft, non-tender, non-distended. No guarding, rebound, or rigidity.  Musculoskeletal: No asymmetry of the lower extremities, no tenderness to palpation.   Skin: Normal, without rash.  Neurologic: Cranial nerves grossly intact, normal cognition, no focal deficits. Alert and oriented x 3.   Psychiatric: Normal affect.  Nursing notes and vital signs reviewed.      Emergency Department Course   ECG  ECG taken at 1219, ECG read at 1500  Normal sinus rhythm  Normal ECG   Changes noted above as compared to prior, dated 25/05/12.  Rate 71 bpm. GA interval 164 ms. QRS duration 102 ms. QT/QTc 396/430 ms. P-R-T axes 64 46 50.     Imaging:  XR Chest 2 Views:  PA and lateral views of the chest were obtained.   Cardiomediastinal silhouette is within normal limits. No suspicious   focal pulmonary opacities. No significant pleural effusion or   pneumothorax., as per radiology.     Laboratory:   CBC: WBC 5.8, HGB 15.3,   BMP:  o/w WNL (Creatinine 0.77)     Troponin (Collected 1503): <0.015    Asymptomatic Covid PCR: pending     Emergency Department Course:    Reviewed:  I reviewed nursing notes, vitals, past medical history and care everywhere    Assessments:  1447 I obtained history and examined the patient as noted above.   1700 I rechecked the patient and explained findings. "     Consults:   1704 I spoke with Cardiology service, regarding patient's presentation, findings, and plan of care.     1800 I spoke with Dr. Toussaint of the Hospitalist service, regarding patient's presentation, findings, and plan of care.     I staffed the patient with Dr. Bhatia.    Interventions:  1506: ASA, 81 mg, PO  1506: Nitrostat, 0.4 mg, sublingual    Disposition:  The patient was admitted to the hospital under the care of Dr. Toussaint.       Impression & Plan     Medical Decision Making:  Chris Barriga is a 60 year old male who presents to the ED via cardiology clinic with chest pain.  Patient has been experiencing pain over the last month, initially exertional, however now has had a few episodes of chest pain at rest.  He does have a history of ACS.  He was sent in with plan for left heart catheterization and angiogram tomorrow.  See HPI as above for additional details.  Vitals and physical exam as above.  Baseline labs obtained as above as well as chest x-ray are reassuring at this time.  Patient provided nitroglycerin here in the ED with improvement of symptoms.  Discussed the case with cardiology, who recommended against initiation of heparin at this time.  Discussed the case with the hospitalist, who agreed to admit the patient.  All questions answered.  Patient admitted in stable condition.    Covid-19  Chris Barriga was evaluated during a global COVID-19 pandemic, which necessitated consideration that the patient might be at risk for infection with the SARS-CoV-2 virus that causes COVID-19.   Applicable protocols for evaluation were followed during the patient's care.   COVID-19 was considered as part of the patient's evaluation. The plan for testing is:  a test was obtained during this visit.      Diagnosis:    ICD-10-CM    1. Chest pain  R07.9    2. Abnormal cardiovascular stress test  R94.39    3. Hx of cardiac catheterization  Z98.890          Scribe Disclosure:  Lester POLANCO  woody Garcia serving as a scribe at 2:42 PM on 7/27/2021 to document services personally performed by Remberto Mendez PA-C based on my observations and the provider's statements to me.     This record was created at least in part using electronic voice recognition software, so please excuse any typographical errors.         Remberto Mendez PA-C  07/27/21 4029

## 2021-07-27 NOTE — PROGRESS NOTES
"Vitals - Patient Reported  Systolic (Patient Reported): 136  Diastolic (Patient Reported): 87  Weight (Patient Reported): 77.1 kg (170 lb)  Height (Patient Reported): 180.3 cm (5' 11\")  BMI (Based on Pt Reported Ht/Wt): 23.71  Pulse (Patient Reported): 72    Review Of Systems  Skin: NEGATIVE  Eyes:Ears/Nose/Throat: NEGATIVE  Respiratory: NEGATIVE  Cardiovascular: Palpations, chest pain  Gastrointestinal: NEGATIVE  Genitourinary:NEGATIVE   Musculoskeletal: Arthritis, neck pain  Neurologic: NEGATIVE  Psychiatric: NEGATIVE  Hematologic/Lymphatic/Immunologic: Allergies  Endocrine:  NEGATIVE    Shivam Pearson NREMT      Chava is a 60 year old who is being evaluated via a billable video visit.      How would you like to obtain your AVS? MyChart  If the video visit is dropped, the invitation should be resent by: Text to cell phone: [phone]  Will anyone else be joining your video visit? No      Video Start Time: 10:24 AM  Video-Visit Details    Type of service:  Video Visit    Video End Time:10:40 AM  Additional 15 minutes spent on chart review, documentation, and review with provider.     Originating Location (pt. Location): Home    Distant Location (provider location):  [facility]     Platform used for Video Visit: [facility]      Cardiology Clinic Progress Note    Chris Barriga MRN# [id number]   YOB: 1960 Age: 60 year old   Primary cardiologist: Dr. Park         Assessment and Plan:     In summary, Chris Barriga presents today for follow up on recent stress testing.     1. Chest discomfort concerning for unstable angina, with short episode of resting pain last evening.  2. Abnormal stress echocardiogram, concerning for ischemia in the OM/diagonal distribution.  Excellent exercise tolerance, completing 15 minutes with appropriate blood pressure response.  Positive chest pain at peak exercise.  3. History of PCI to proximal LAD in 2012.  Severe ostial small " "artery OM1 lesion left alone.  Mild disease elsewhere.  4. Hyperlipidemia, very well controlled.    Plan:  -Left heart catheterization, coronary angiogram with possible PCI recommended. Spoke with Dr. Morales over the phone, who is in the cath lab today. Due to lab schedule, we will not be able to cath him electively this afternoon and therefore, he recommends he go through the ED for admission and plan for cath tomorrow. Chava and his wife are in agreement with this plan and will proceed to [facility] ED now. ED called with update.   Risks and benefits of left heart catheterization and coronary angiogram were discussed with the patient in detail. 0.1-0.3% (for diagnostic angio) and 1-2% (for PCI)  risk of stroke, MI, death, emergent bypass for diagnostic angio, risk of contrast induced allergic reaction, renal dysfunction, vascular complications were discussed. Patient understands and wishes to proceed. The patient would be an appropriate candidate for DAPT, if required.   -Continue aspirin, statin.  Going to defer beta-blocker therapy for the moment, as he was just placed on amlodipine several days ago by Dr. Delgado, this can be addressed by inpatient team.   -Follow-up with me 1 to 2 weeks post procedure.        History of Presenting Illness:      Chris Barriga is a pleasant 60 year old patient who presents today to follow up on stress testing.    He has a history of CAD, NSTEMI in 2012, s/p PCI to prox LAD. There was otherwise an 80-90% ostial small OM1 lesion which was managed medically, and very mild disease elsewhere. Since that time, he has been described as a \"model patient,\" compliant with routine exercise, a heart-healthy diet, and medications.   He saw Dr. Park last in January, and was feeling quite well. Fasting lipid profile is the best he has ever had, between his increase in exercise and weight loss.  Total cholesterol is now down to 147, HDL is up to 91, LDL is 35 and triglycerides are 107.  BMP " "at that time was WNL.    On 7/5, Chava sent a [employer] message with some concerns:  Over the past 10 days or so, I have had some slight chest tightness / nausea at some point during approximately half of my bike rides (4 of 7 rides during this time period).  It has varied from vigorous rides like a spin class (once) to longer, leisurely rides (twice) to one fairly tough road ride (24 miles / several steep hills).  The other 3 times (again varied rides) everything was normal and I experienced no symptoms at all.  This reminds me of similar symptoms I experienced 9 years ago prior to my stent where it took a while to diagnose my blockage due to my other risk factors looking so good.    A stress echo was ordered. This took place on 7/23 and showed distal anterolateral ischemia in a diagonal / OM distribution post stress with chest discomfort during the test (at peak exercise). He exercised 15 METs without ischemic changes on EKG. BP response to exercise was normal.     This was reviewed with Dr. Delgado in Dr. Park's absence, who recommended initiation of amlodipine 2.5 mg daily, and he was scheduled to see me today for further review.     Today, I am meeting Chava for the first time. He is very pleasant. Unfortunately, he tells me that his symptoms have progressed over the past 7-10 days. He's continued to exercise mildly and went for a very liesurely bikeride on Sunday with waxing and waning chest discomfort throughout the ride. He also had very mild discomfort at the end of his walk earlier today. Additionally, he's had two episodes of resting discomfort. About ten days ago, he awoke with mild discomfort. Last evening he had another while seated and watching the [employer]. Describes this as a \"flutter sensation\" lasting about 5 minutes in duration with spontaneous resolution. He's used SL NTG twice in the past ten days, after \"not feeling right\" following completion of exercise. Patient denies shortness of breath, PND, " orthopnea, edema, near syncope or syncope.  He has no issues with abnormal bruising or bleeding, and no upcoming necessary procedures or surgeries. He ate breakfast about an hour ago.          Review of Systems:     12-pt ROS is negative except for as noted in the HPI.          Physical Exam:     Vitals: There were no vitals taken for this visit.  Wt Readings from Last 10 Encounters:   05/29/19 82.6 kg (182 lb 3.2 oz)   11/13/18 80.2 kg (176 lb 12.8 oz)   10/06/17 81.6 kg (179 lb 12.8 oz)   09/06/17 81 kg (178 lb 8 oz)   05/22/17 77.1 kg (170 lb)   02/17/17 79.4 kg (175 lb)   04/23/14 80.6 kg (177 lb 9.6 oz)   12/30/13 78.6 kg (173 lb 3.2 oz)   12/24/13 74.8 kg (165 lb)   08/21/12 78 kg (172 lb)       Constitutional:  Patient is pleasant, alert, cooperative, and in NAD.  HEENT:  NCAT. PERRLA. EOM's intact.   Neck:  CVP appears normal.  Pulmonary: Normal respiratory effort.  Skin:  No rashes or lesions appreciated.   Neurological:  No gross motor or sensory deficits.   Psych: Appropriate affect.        Data:     Labs reviewed:  Recent Labs   Lab Test 01/21/21  0824 09/17/20  0000 05/29/19  0929 10/17/18  0722   LDL 35 71 71 38   HDL 91 83 80 65   NHDL 56  --  89 55   CHOL 147 166 169 120   TRIG 107 61 88 83       Lab Results   Component Value Date    WBC 5.5 08/21/2017    RBC 4.81 08/21/2017    HGB 15.4 08/21/2017    HCT 44.3 08/21/2017    MCV 92 08/21/2017    MCH 32 08/21/2017    MCHC 34.8 08/21/2017    RDW 13 08/21/2017     08/21/2017       Lab Results   Component Value Date     01/21/2021    POTASSIUM 4.2 01/21/2021    CHLORIDE 106 01/21/2021    CO2 30 01/21/2021    ANIONGAP 2 (L) 01/21/2021    GLC 97 01/21/2021    BUN 20 01/21/2021    CR 0.90 01/21/2021    GFRESTIMATED >90 01/21/2021    GFRESTBLACK >90 01/21/2021    DANY 8.9 01/21/2021      Lab Results   Component Value Date    AST 21 09/04/2018    ALT 24 09/17/2020       No results found for: A1C    Lab Results   Component Value Date    INR 0.89  05/25/2012           Problem List:     Patient Active Problem List   Diagnosis     Pain in joint, lower leg     Other postprocedural status(V45.89)     Iliac artery aneurysm, left (H)     Coronary artery disease involving native coronary artery of native heart without angina pectoris     Benign essential hypertension     Pure hypercholesterolemia     Aortic root dilation (H)     Ischemic cardiomyopathy           Medications:     Current Outpatient Medications   Medication Sig Dispense Refill     amLODIPine (NORVASC) 5 MG tablet Take 2.5mg (1/2 tab) daily 20 tablet 1     ASPIRIN EC PO Take 81 mg by mouth daily       atorvastatin (LIPITOR) 10 MG tablet Take 1 tablet (10 mg) by mouth every other day 45 tablet 2     fish oil-omega-3 fatty acids (FISH OIL) 1000 MG capsule Take 1 g by mouth daily        Flaxseed, Linseed, (FLAX SEED OIL) 1000 MG capsule Take 1 capsule by mouth daily       glucosamine-chondroitin 500-400 MG CAPS Take 2 capsules by mouth daily        hydrocortisone (WESTCORT) 0.2 % cream Apply topically 2 times daily as needed        loratadine (CLARITIN) 10 MG capsule Take 10 mg by mouth daily       losartan-hydrochlorothiazide (HYZAAR) 100-25 MG per tablet Take 1 tablet by mouth daily       Lysine 500 MG TABS Take 1 tablet by mouth daily.       magnesium 500 MG TABS Take 500 mg by mouth every evening       MINOCYCLINE HCL PO Take 100 mg by mouth daily as needed (acne)        Multiple Vitamin (MULTI-VITAMIN) per tablet Take 1 tablet by mouth daily.       nitroglycerin (NITROSTAT) 0.4 MG SL tablet Place 1 tablet under the tongue every 5 minutes as needed for chest pain for 3 doses. Administer every 5 minutes as needed.  Maximum 3 doses in 15 minutes. (Patient not taking: Reported on 1/26/2021) 75 tablet 3     Potassium Gluconate 595 (99 K) MG TABS Take 1 tablet by mouth daily             Past Medical History:     Past Medical History:   Diagnosis Date     Adenoma of ascending colon      Aortic root dilation  (H)      Chest pain      Coronary artery disease     5/2012: Stent to proximal LAD for unstable angina. Proximal left circumflex 30-40%. EF 60%     HTN (hypertension)      Hyperlipidaemia      Iliac artery aneurysm, left (H)      Past Surgical History:   Procedure Laterality Date     APPENDECTOMY OPEN       ARTHROSCOPY KNEE BILATERAL      both knees     ARTHROSCOPY SHOULDER      left     C ORAL SURGERY PROCEDURE       CARDIAC SURGERY      stent x 1     COLONOSCOPY N/A 2/17/2017    Procedure: COLONOSCOPY;  Surgeon: Junie Grier MD;  Location:  OR     COLONOSCOPY  12/2016    [facility] [place]     COLONOSCOPY  05/22/2017    Dr. Grier [facility]     COLONOSCOPY N/A 5/29/2018    Procedure: COLONOSCOPY;  colonoscopy (crsal);  Surgeon: Junie Grier MD;  Location: [facility]     LAPAROSCOPIC HERNIORRHAPHY INGUINAL  12/30/2013    Procedure: LAPAROSCOPIC HERNIORRHAPHY INGUINAL;  LAPAROSCOPIC RIGHT INGUINAL HERNIA REPAIR WITH MESH;  Surgeon: Reji Pizano MD;  Location: [facility]     ORTHOPEDIC SURGERY      scope of both shoulders     VASECTOMY       Family History   Problem Relation Age of Onset     Breast Cancer Mother      Cerebrovascular Disease Father      Breast Cancer Maternal Grandmother      Breast Cancer Son      Colon Cancer No family hx of      Social History     Socioeconomic History     Marital status:      Spouse name: Not on file     Number of children: Not on file     Years of education: Not on file     Highest education level: Not on file   Occupational History     Not on file   Tobacco Use     Smoking status: Never Smoker     Smokeless tobacco: Never Used   Substance and Sexual Activity     Alcohol use: Yes     Comment: 2-3 per week     Drug use: No     Sexual activity: Not on file   Other Topics Concern     Parent/sibling w/ CABG, MI or angioplasty before 65F 55M? Not Asked   Social History Narrative     Not on file     Social Determinants of Health     Financial Resource Strain:       Difficulty of Paying Living Expenses:    Food Insecurity:      Worried About Running Out of Food in the Last Year:      Ran Out of Food in the Last Year:    Transportation Needs:      Lack of Transportation (Medical):      Lack of Transportation (Non-Medical):    Physical Activity:      Days of Exercise per Week:      Minutes of Exercise per Session:    Stress:      Feeling of Stress :    Social Connections:      Frequency of Communication with Friends and Family:      Frequency of Social Gatherings with Friends and Family:      Attends Alevism Services:      Active Member of Clubs or Organizations:      Attends Club or Organization Meetings:      Marital Status:    Intimate Partner Violence:      Fear of Current or Ex-Partner:      Emotionally Abused:      Physically Abused:      Sexually Abused:            Allergies:   Seasonal allergies, Hydrocodone-acetaminophen, and Molds & smuts      Junie Matthew PA-C  [facility]  Pager: [phone]

## 2021-07-28 VITALS
RESPIRATION RATE: 18 BRPM | HEIGHT: 71 IN | WEIGHT: 175.3 LBS | SYSTOLIC BLOOD PRESSURE: 150 MMHG | HEART RATE: 77 BPM | BODY MASS INDEX: 24.54 KG/M2 | OXYGEN SATURATION: 100 % | TEMPERATURE: 95.9 F | DIASTOLIC BLOOD PRESSURE: 63 MMHG

## 2021-07-28 LAB
CHOLEST SERPL-MCNC: 155 MG/DL
FASTING STATUS PATIENT QL REPORTED: YES
HBA1C MFR BLD: 5 % (ref 0–5.6)
HDLC SERPL-MCNC: 92 MG/DL
LDLC SERPL CALC-MCNC: 50 MG/DL
NONHDLC SERPL-MCNC: 63 MG/DL
TRIGL SERPL-MCNC: 67 MG/DL
TROPONIN I SERPL-MCNC: <0.015 UG/L (ref 0–0.04)
TROPONIN I SERPL-MCNC: <0.015 UG/L (ref 0–0.04)

## 2021-07-28 PROCEDURE — 85347 COAGULATION TIME ACTIVATED: CPT | Mod: 91

## 2021-07-28 PROCEDURE — 250N000013 HC RX MED GY IP 250 OP 250 PS 637: Performed by: HOSPITALIST

## 2021-07-28 PROCEDURE — 272N000001 HC OR GENERAL SUPPLY STERILE: Performed by: INTERNAL MEDICINE

## 2021-07-28 PROCEDURE — 83036 HEMOGLOBIN GLYCOSYLATED A1C: CPT | Performed by: HOSPITALIST

## 2021-07-28 PROCEDURE — 250N000011 HC RX IP 250 OP 636: Performed by: INTERNAL MEDICINE

## 2021-07-28 PROCEDURE — 93454 CORONARY ARTERY ANGIO S&I: CPT | Performed by: INTERNAL MEDICINE

## 2021-07-28 PROCEDURE — 99203 OFFICE O/P NEW LOW 30 MIN: CPT | Mod: 25 | Performed by: INTERNAL MEDICINE

## 2021-07-28 PROCEDURE — C1769 GUIDE WIRE: HCPCS | Performed by: INTERNAL MEDICINE

## 2021-07-28 PROCEDURE — 99153 MOD SED SAME PHYS/QHP EA: CPT | Performed by: INTERNAL MEDICINE

## 2021-07-28 PROCEDURE — C1760 CLOSURE DEV, VASC: HCPCS | Performed by: INTERNAL MEDICINE

## 2021-07-28 PROCEDURE — 99152 MOD SED SAME PHYS/QHP 5/>YRS: CPT | Performed by: INTERNAL MEDICINE

## 2021-07-28 PROCEDURE — 999N000054 HC STATISTIC EKG NON-CHARGEABLE

## 2021-07-28 PROCEDURE — 250N000013 HC RX MED GY IP 250 OP 250 PS 637: Performed by: PHYSICIAN ASSISTANT

## 2021-07-28 PROCEDURE — 93005 ELECTROCARDIOGRAM TRACING: CPT

## 2021-07-28 PROCEDURE — 36415 COLL VENOUS BLD VENIPUNCTURE: CPT | Performed by: HOSPITALIST

## 2021-07-28 PROCEDURE — 250N000009 HC RX 250: Performed by: INTERNAL MEDICINE

## 2021-07-28 PROCEDURE — 93454 CORONARY ARTERY ANGIO S&I: CPT | Mod: 26 | Performed by: INTERNAL MEDICINE

## 2021-07-28 PROCEDURE — 80061 LIPID PANEL: CPT | Performed by: HOSPITALIST

## 2021-07-28 PROCEDURE — G0378 HOSPITAL OBSERVATION PER HR: HCPCS

## 2021-07-28 PROCEDURE — 250N000013 HC RX MED GY IP 250 OP 250 PS 637: Performed by: INTERNAL MEDICINE

## 2021-07-28 PROCEDURE — C1887 CATHETER, GUIDING: HCPCS | Performed by: INTERNAL MEDICINE

## 2021-07-28 PROCEDURE — 99152 MOD SED SAME PHYS/QHP 5/>YRS: CPT | Mod: 59 | Performed by: INTERNAL MEDICINE

## 2021-07-28 PROCEDURE — 84484 ASSAY OF TROPONIN QUANT: CPT | Mod: 91 | Performed by: HOSPITALIST

## 2021-07-28 PROCEDURE — 99217 PR OBSERVATION CARE DISCHARGE: CPT | Performed by: PHYSICIAN ASSISTANT

## 2021-07-28 PROCEDURE — C1725 CATH, TRANSLUMIN NON-LASER: HCPCS | Performed by: INTERNAL MEDICINE

## 2021-07-28 RX ORDER — FLUMAZENIL 0.1 MG/ML
0.2 INJECTION, SOLUTION INTRAVENOUS
Status: DISCONTINUED | OUTPATIENT
Start: 2021-07-28 | End: 2021-07-28 | Stop reason: HOSPADM

## 2021-07-28 RX ORDER — NALOXONE HYDROCHLORIDE 0.4 MG/ML
0.4 INJECTION, SOLUTION INTRAMUSCULAR; INTRAVENOUS; SUBCUTANEOUS
Status: DISCONTINUED | OUTPATIENT
Start: 2021-07-28 | End: 2021-07-28 | Stop reason: HOSPADM

## 2021-07-28 RX ORDER — HEPARIN SODIUM 1000 [USP'U]/ML
INJECTION, SOLUTION INTRAVENOUS; SUBCUTANEOUS
Status: DISCONTINUED | OUTPATIENT
Start: 2021-07-28 | End: 2021-07-28 | Stop reason: HOSPADM

## 2021-07-28 RX ORDER — LORAZEPAM 0.5 MG/1
0.5 TABLET ORAL
Status: CANCELLED | OUTPATIENT
Start: 2021-07-28

## 2021-07-28 RX ORDER — FENTANYL CITRATE 50 UG/ML
INJECTION, SOLUTION INTRAMUSCULAR; INTRAVENOUS
Status: DISCONTINUED | OUTPATIENT
Start: 2021-07-28 | End: 2021-07-28 | Stop reason: HOSPADM

## 2021-07-28 RX ORDER — NALOXONE HYDROCHLORIDE 0.4 MG/ML
0.2 INJECTION, SOLUTION INTRAMUSCULAR; INTRAVENOUS; SUBCUTANEOUS
Status: DISCONTINUED | OUTPATIENT
Start: 2021-07-28 | End: 2021-07-28 | Stop reason: HOSPADM

## 2021-07-28 RX ORDER — ASPIRIN 81 MG/1
81 TABLET, CHEWABLE ORAL ONCE
Status: DISCONTINUED | OUTPATIENT
Start: 2021-07-28 | End: 2021-07-28 | Stop reason: HOSPADM

## 2021-07-28 RX ORDER — ACETAMINOPHEN 325 MG/1
650 TABLET ORAL EVERY 4 HOURS PRN
Status: DISCONTINUED | OUTPATIENT
Start: 2021-07-28 | End: 2021-07-28 | Stop reason: HOSPADM

## 2021-07-28 RX ORDER — ATROPINE SULFATE 0.1 MG/ML
0.5 INJECTION INTRAVENOUS
Status: DISCONTINUED | OUTPATIENT
Start: 2021-07-28 | End: 2021-07-28 | Stop reason: HOSPADM

## 2021-07-28 RX ORDER — ASPIRIN 325 MG
325 TABLET ORAL ONCE
Status: CANCELLED | OUTPATIENT
Start: 2021-07-28 | End: 2021-07-28

## 2021-07-28 RX ORDER — AMLODIPINE BESYLATE 5 MG/1
5 TABLET ORAL DAILY
Qty: 30 TABLET | Refills: 0 | Status: SHIPPED | OUTPATIENT
Start: 2021-07-29 | End: 2021-08-18

## 2021-07-28 RX ORDER — HYDRALAZINE HYDROCHLORIDE 20 MG/ML
10 INJECTION INTRAMUSCULAR; INTRAVENOUS EVERY 4 HOURS PRN
Status: DISCONTINUED | OUTPATIENT
Start: 2021-07-28 | End: 2021-07-28 | Stop reason: HOSPADM

## 2021-07-28 RX ORDER — OXYCODONE HYDROCHLORIDE 5 MG/1
5 TABLET ORAL EVERY 4 HOURS PRN
Status: DISCONTINUED | OUTPATIENT
Start: 2021-07-28 | End: 2021-07-28 | Stop reason: HOSPADM

## 2021-07-28 RX ORDER — FENTANYL CITRATE 50 UG/ML
25 INJECTION, SOLUTION INTRAMUSCULAR; INTRAVENOUS
Status: DISCONTINUED | OUTPATIENT
Start: 2021-07-28 | End: 2021-07-28 | Stop reason: HOSPADM

## 2021-07-28 RX ORDER — SODIUM CHLORIDE 9 MG/ML
INJECTION, SOLUTION INTRAVENOUS CONTINUOUS
Status: DISCONTINUED | OUTPATIENT
Start: 2021-07-28 | End: 2021-07-28 | Stop reason: HOSPADM

## 2021-07-28 RX ORDER — LIDOCAINE 40 MG/G
CREAM TOPICAL
Status: CANCELLED | OUTPATIENT
Start: 2021-07-28

## 2021-07-28 RX ORDER — ASPIRIN 81 MG/1
81 TABLET ORAL DAILY
Status: DISCONTINUED | OUTPATIENT
Start: 2021-07-29 | End: 2021-07-28 | Stop reason: HOSPADM

## 2021-07-28 RX ORDER — ONDANSETRON 2 MG/ML
4 INJECTION INTRAMUSCULAR; INTRAVENOUS EVERY 6 HOURS PRN
Status: DISCONTINUED | OUTPATIENT
Start: 2021-07-28 | End: 2021-07-28 | Stop reason: HOSPADM

## 2021-07-28 RX ORDER — IOPAMIDOL 755 MG/ML
INJECTION, SOLUTION INTRAVASCULAR
Status: DISCONTINUED | OUTPATIENT
Start: 2021-07-28 | End: 2021-07-28 | Stop reason: HOSPADM

## 2021-07-28 RX ORDER — SODIUM CHLORIDE 9 MG/ML
INJECTION, SOLUTION INTRAVENOUS CONTINUOUS
Status: CANCELLED | OUTPATIENT
Start: 2021-07-28

## 2021-07-28 RX ORDER — POTASSIUM CHLORIDE 1500 MG/1
20 TABLET, EXTENDED RELEASE ORAL
Status: CANCELLED | OUTPATIENT
Start: 2021-07-28

## 2021-07-28 RX ORDER — METOPROLOL TARTRATE 1 MG/ML
5-10 INJECTION, SOLUTION INTRAVENOUS
Status: DISCONTINUED | OUTPATIENT
Start: 2021-07-28 | End: 2021-07-28 | Stop reason: HOSPADM

## 2021-07-28 RX ORDER — LOSARTAN POTASSIUM 50 MG/1
50 TABLET ORAL DAILY
Qty: 30 TABLET | Refills: 0 | Status: SHIPPED | OUTPATIENT
Start: 2021-07-29 | End: 2021-07-29

## 2021-07-28 RX ORDER — LOSARTAN POTASSIUM 50 MG/1
50 TABLET ORAL DAILY
Status: DISCONTINUED | OUTPATIENT
Start: 2021-07-29 | End: 2021-07-28 | Stop reason: HOSPADM

## 2021-07-28 RX ORDER — OXYCODONE HYDROCHLORIDE 5 MG/1
10 TABLET ORAL EVERY 4 HOURS PRN
Status: DISCONTINUED | OUTPATIENT
Start: 2021-07-28 | End: 2021-07-28 | Stop reason: HOSPADM

## 2021-07-28 RX ORDER — ASPIRIN 81 MG/1
243 TABLET, CHEWABLE ORAL ONCE
Status: CANCELLED | OUTPATIENT
Start: 2021-07-28

## 2021-07-28 RX ORDER — NITROGLYCERIN 0.4 MG/1
0.4 TABLET SUBLINGUAL EVERY 5 MIN PRN
Status: DISCONTINUED | OUTPATIENT
Start: 2021-07-28 | End: 2021-07-28 | Stop reason: HOSPADM

## 2021-07-28 RX ORDER — ONDANSETRON 4 MG/1
4 TABLET, ORALLY DISINTEGRATING ORAL EVERY 6 HOURS PRN
Status: DISCONTINUED | OUTPATIENT
Start: 2021-07-28 | End: 2021-07-28 | Stop reason: HOSPADM

## 2021-07-28 RX ORDER — AMLODIPINE BESYLATE 5 MG/1
5 TABLET ORAL DAILY
Status: DISCONTINUED | OUTPATIENT
Start: 2021-07-29 | End: 2021-07-28 | Stop reason: HOSPADM

## 2021-07-28 RX ORDER — LORAZEPAM 2 MG/ML
0.5 INJECTION INTRAMUSCULAR
Status: CANCELLED | OUTPATIENT
Start: 2021-07-28

## 2021-07-28 RX ADMIN — ASPIRIN 81 MG: 81 TABLET, COATED ORAL at 08:14

## 2021-07-28 RX ADMIN — LORATADINE 10 MG: 10 TABLET ORAL at 08:14

## 2021-07-28 RX ADMIN — AMLODIPINE BESYLATE 2.5 MG: 2.5 TABLET ORAL at 08:14

## 2021-07-28 RX ADMIN — LOSARTAN POTASSIUM 100 MG: 100 TABLET, FILM COATED ORAL at 08:14

## 2021-07-28 ASSESSMENT — MIFFLIN-ST. JEOR: SCORE: 1632.29

## 2021-07-28 NOTE — PROGRESS NOTES
PRIMARY DIAGNOSIS: CHEST PAIN  OUTPATIENT/OBSERVATION GOALS TO BE MET BEFORE DISCHARGE:  1. Ruled out acute coronary syndrome (negative or stable Troponin):  serial troponin in process, 2x negative  2. Pain Status: chest pain 1/10, has improved  3. Appropriate provocative testing performed: No  - Stress Test Procedure: planned angiogram  - Interpretation of cardiac rhythm per telemetry tech: NSR    4. Cleared by Consultants (if applicable):No  5. Return to near baseline physical activity: Yes  Discharge Planner Nurse   Safe discharge environment identified: Yes  Barriers to discharge: Yes       Entered by: Yann Gonzalez 07/28/2021 5:46 AM     Please review provider order for any additional goals.   Nurse to notify provider when observation goals have been met and patient is ready for discharge.

## 2021-07-28 NOTE — PLAN OF CARE
PRIMARY DIAGNOSIS: CHEST PAIN  OUTPATIENT/OBSERVATION GOALS TO BE MET BEFORE DISCHARGE:  1. Ruled out acute coronary syndrome (negative or stable Troponin):  Yes  2. Pain Status: Pain free.  3. Appropriate provocative testing performed: Yes  - Stress Test Procedure: Echo  - Interpretation of cardiac rhythm per telemetry tech: NSR    4. Cleared by Consultants (if applicable):No  5. Return to near baseline physical activity: Yes  Discharge Planner Nurse   Safe discharge environment identified: Yes  Barriers to discharge: Yes       Entered by: Chanelle Drummond 07/28/2021 6:39 PM     Please review provider order for any additional goals.   Nurse to notify provider when observation goals have been met and patient is ready for discharge.

## 2021-07-28 NOTE — PLAN OF CARE
A&Ox4, VSS on RA. Tele NSR. Denies pain. Up ad zhou. Low sat fat diet, voiding adequately. IV SL. Angiogram done today, no intervention. R groin site CDI, soft, no hematoma. Ambulated in room, no bleeding noted. Plan to discharge today on medication management and follow up with Cardiology.

## 2021-07-28 NOTE — ED PROVIDER NOTES
Emergency Department Attending Supervision Note  7/28/2021  12:54 AM      I evaluated this patient in conjunction with Remberto Mendez PA-C and agree with her evaluation, exam, diagnosis, and disposition.      Briefly, the patient presented with exertional chest pain now with more episodes coming on at rest.      On my exam, patient appeared comfortable and was pain-free at this time.  He had received 1 nitro which relieved his pain.  His EKG does not show an acute ischemic pattern.  Troponin is normal.  After discussion with the cardiologist and the hospitalist, will hold on heparin at this time being that he is pain-free with a normal troponin and EKG.  Further chest pain would be treated with nitro.  He will be admitted to the hospitalist with plans for cardiac cath tomorrow.      Diagnosis    ICD-10-CM    1. Chest pain  R07.9    2. Abnormal cardiovascular stress test  R94.39    3. Hx of cardiac catheterization  Z98.890          MD Sriram Ken Tracy Alan, MD  07/28/21 0055

## 2021-07-28 NOTE — PLAN OF CARE
A/O x4. VSS on RA. Independent. C/o chest pain of 1/10 overnight. NPO after midnight. Tele SR. Cardiology consult. Troponin negative x2.

## 2021-07-28 NOTE — PLAN OF CARE
PRIMARY DIAGNOSIS: CHEST PAIN  OUTPATIENT/OBSERVATION GOALS TO BE MET BEFORE DISCHARGE:  1. Ruled out acute coronary syndrome (negative or stable Troponin):  Yes  2. Pain Status: Pain free.  3. Appropriate provocative testing performed: Yes  - Stress Test Procedure: Echo  - Interpretation of cardiac rhythm per telemetry tech: NSR    4. Cleared by Consultants (if applicable):No  5. Return to near baseline physical activity: Yes  Discharge Planner Nurse   Safe discharge environment identified: Yes  Barriers to discharge: Yes       Entered by: Chanelle Drummond 07/28/2021 6:38 PM     Please review provider order for any additional goals.   Nurse to notify provider when observation goals have been met and patient is ready for discharge.

## 2021-07-28 NOTE — PROGRESS NOTES
[facility]    Medicine Progress Note - Hospitalist Service       Date of Admission:  7/27/2021    Assessment & Plan         Chris Barriga is a 60 year old male with PMHx of CAD with PCI and stenting to the proximal LAD in 2012 admitted on 7/27/2021 for recurrent chest pain and recent abdominal stress test.      Chest pain concerning for unstable angina  History of coronary artery disease s/p REI to proximal LAD (2012)  Hypertension: Recent positive stress test on 7/26 with the distal anteriolateral ischemia in the diagonal/OM distribution.  During the angiogram in 2012 he was also noted to have 30-40% proximal circumflex and 90% blockage in the OM which was a small vessel and hence medical management was recommended.  He has done fairly well since until few months ago when he started noticing exertional chest pain.  * Trop negative X4, A1C 5, Lipid profile 155/50/92/67.   - Cardiology consulted, plan for angiogram today  - Continue PTA Norvasc 2.5 mg po every day, ASA 81 mg po every day, Lipitor 10 mg po every other day, Losartan 100 mg po every day, sublingual nitroglyercin   - Hold hydrochlorothiazide 25 mg po every day given plan for angiogram   - NPO status       Diet: NPO per Anesthesia Guidelines for Procedure/Surgery Except for: Meds    DVT Prophylaxis: Ambulate every shift  Long Catheter: Not present  Central Lines: None  Code Status: Full Code      Disposition Plan   Expected discharge: 07/29/2021 pending angiogram results.      The patient's care was discussed with the Attending Physician, Dr. Yee, Bedside Nurse and Patient.    Gloria Rider PA-C  Hospitalist Service  [facility]  Securely message with the Vocera Web Console (learn more here)  Text page via [employer] Paging/Directory  ______________________________________________________________________    Interval History   Resting comfortably. Reports 1/10 chest pain. Seen by  Cardiology. Plan for angiogram.     Data reviewed today: I reviewed all medications, new labs and imaging results over the last 24 hours. I personally reviewed all labs and imaging to date.     Physical Exam   Vital Signs: Temp: 97  F (36.1  C) Temp src: Oral BP: 129/72 Pulse: 65   Resp: 18 SpO2: 100 % O2 Device: None (Room air)    Weight: 175 lbs 4.8 oz    CONSTITUTIONAL: Pt laying in bed, dressed in hospital garb. Appears comfortable. Cooperative with interview.   HEENT: Normocephalic, atraumatic.   CARDIOVASCULAR: RRR, no murmurs, rubs, or extra heart sounds appreciated. Pulses +2/4 and regular in upper and lower extremities, bilaterally.   RESPIRATORY: No increased work of breathing.  CTA, bilat; no wheezes, rales, or rhonchi appreciated.  GASTROINTESTINAL:  Abdomen soft, non-distended. BS auscultated in all four quadrants. Negative for tenderness to palpation.  No masses or organomegaly noted.  MUSCULOSKELETAL: No gross deformities noted. Normal muscle tone.   HEMATOLOGIC/LYMPHATIC/IMMUNOLOGIC: Negative for lower extremity edema, bilaterally.  NEUROLOGIC: Alert and oriented to person, place, and time.  strength intact. No focal neuro deficits.   SKIN: Warm, dry, intact. No jaundice noted. Negative for suspicious lesions, rashes, bruising, open sores or abrasions.     Data   Recent Labs   Lab 07/28/21  0431 07/28/21  0051 07/27/21  2253 07/27/21  1503   WBC  --   --   --  5.8   HGB  --   --   --  15.3   MCV  --   --   --  95   PLT  --   --   --  187   NA  --   --   --  140   POTASSIUM  --   --   --  3.9   CHLORIDE  --   --   --  107   CO2  --   --   --  30   BUN  --   --   --  21   CR  --   --   --  0.77   ANIONGAP  --   --   --  3   DANY  --   --   --  9.1   GLC  --   --   --  96   TROPONIN <0.015 <0.015 <0.015 <0.015     No results found for this or any previous visit (from the past 24 hour(s)).  Medications       amLODIPine  2.5 mg Oral Daily     aspirin  81 mg Oral Daily     [START ON 7/29/2021]  atorvastatin  10 mg Oral Every Other Day     loratadine  10 mg Oral Daily     losartan  100 mg Oral Daily

## 2021-07-28 NOTE — PROVIDER NOTIFICATION
MD Notification    Notified Person: PA     Notified Person Name: Gloria Rider     Notification Date/Time: 7/28/2021 5:08pm     Notification Interaction: web page     Purpose of Notification: Dr. Delgado from Cardiology stated ok to discharge from his standpoint, will recommend meds. Told him that we will need documentation of clearance to discharge and meds. MR RN     Orders Received: plan to discharge today     Comments:

## 2021-07-28 NOTE — DISCHARGE SUMMARY
[facility]  Hospitalist Discharge Summary      Date of Admission:  7/27/2021  Date of Discharge:  7/28/2021  Discharging Provider: Gloria Rider PA-C    Discharge Diagnoses   Chest pain concerning for unstable angina with noted 95% stenosis of OM1 with unsuccessful stenting  History of coronary artery disease s/p REI to proximal LAD (2012)  Hypertension    Follow-ups Needed After Discharge   Follow-up Appointments     Follow-up and recommended labs and tests       Follow up with primary care provider, Bharathi Chowdary, within 7 days for   hospital follow- up.  The following labs/tests are recommended: BMP.    Follow up with Cardiology in the next 1-2 weeks for hospital follow-up and   medication adjustment.           Unresulted Labs Ordered in the Past 30 Days of this Admission     No orders found for last 31 day(s).      These results will be followed up by PCP    Discharge Disposition   Discharged to home  Condition at discharge: Stable    Hospital Course   Chris Barriga is a 60 year old male with PMHx of CAD with PCI and stenting to the proximal LAD in 2012 admitted on 7/27/2021 for recurrent chest pain and recent abdominal stress test.      Chest pain concerning for unstable angina with noted 95% stenosis of OM1 with unsuccessful stenting  History of coronary artery disease s/p REI to proximal LAD (2012)  Hypertension: Recent positive stress test on 7/26 with the distal anteriolateral ischemia in the diagonal/OM distribution.  During the angiogram in 2012 he was also noted to have 30-40% proximal circumflex and 90% blockage in the OM which was a small vessel and hence medical management was recommended.  He has done fairly well since until few months ago when he started noticing exertional chest pain.  [Norvasc 2.5 mg po every day, ASA 81 mg po every day, Lipitor 10 mg po every day, Hyzaar 100-25 mg po every day]  * Trop negative X4, A1C 5, Lipid profile  155/50/92/67.    * Angiogram 7/28 with noted Ost Cx to Prox Cx lesion 50% stenosed, 1st mrg lesion 95% stenosed. Unsuccessful PCI of OM1.   - Cardiology following; recommend medical management of above. If sx persist despite medication optimization, Cards will reconsider reattempt at stenting   - Losartan 50 mg po every day, Norvasc 5 mg po every day. Hydrochlorothiazide 25 mg po every day discontinued as well as potassium supplementation to allow for uptitration of other meds. BPs 120s-130s/70s-80s off hydrochlorothiazide during hospital stay. Close Cards follow up.   - Continue ASA 81 mg po every day, Lipitor 10 mg po every day, SL nitroglycerin PRN  - Return to the ED with worsening symptoms     Consultations This Hospital Stay   CARDIOLOGY IP CONSULT  NUTRITION SERVICES ADULT IP CONSULT  CARDIAC REHAB IP CONSULT  PHARMACY IP CONSULT  PHARMACY IP CONSULT  SMOKING CESSATION PROGRAM IP CONSULT    Code Status   Full Code    Time Spent on this Encounter   I, Gloria Rider PA-C, personally saw the patient today and spent greater than 30 minutes discharging this patient.       Gloria Rider PA-C  [facility] OBSERVATION  [address] [place] [id number]  Phone: [phone]  __________________________________________________________    Physical Exam   Vital Signs: Temp: (!) 95.9  F (35.5  C) Temp src: Oral BP: (!) 150/63 Pulse: 77   Resp: 18 SpO2: 100 % O2 Device: None (Room air)    Weight: 175 lbs 4.8 oz    Refer to progress note for physical exam.        Primary Care Physician   Bharathi Chowdary    Discharge Orders      CARDIAC REHAB REFERRAL      Follow-Up with Cardiac Advanced Practice Provider      Reason for your hospital stay    You were hospitalized for chest pain with abnormal stress test. You underwent angiogram. An attempt at stent placement was made, but unsuccessful. Seen by Cardiology with plan for medication management. If persistent  symptoms would consider repeat angiogram and re-attempt at stent placement.     Follow-up and recommended labs and tests     Follow up with primary care provider, Bharathi Chowdary, within 7 days for hospital follow- up.  The following labs/tests are recommended: BMP.    Follow up with Cardiology in the next 1-2 weeks for hospital follow-up and medication adjustment.     Activity    Your activity upon discharge: activity as tolerated     Monitor and record    blood pressure daily     Discharge Instructions    1) Close follow-up with Cardiology upon discharge for ongoing medication adjustment and symptom monitoring  2) Losartan-Hydrochlorothiazide discontinued; recommend Losartan 50 mg daily, will hold off on hydrochlorothiazide to ensure you tolerate changes to other antihypertensives.   3) Norvasc increased from 2.5 mg daily to 5 mg daily   4) Follow-up with PCP for hospital follow-up, repeat BMP   5) Hold potassium supplement while off hydrochlorothiazide  6) Return to the ED with worsening symptoms  7) Monitor and record blood pressures daily, bring to Cardiology follow-up     Diet    Follow this diet upon discharge: Orders Placed This Encounter      Low Saturated Fat Na <2400 mg       Significant Results and Procedures   Most Recent 3 CBC's:Recent Labs   Lab Test 07/27/21  1503 08/21/17  0000 02/17/17  1100   WBC 5.8 5.5  --    HGB 15.3 15.4 16.9   MCV 95 92  --     142  --      Most Recent 3 BMP's:Recent Labs   Lab Test 07/27/21  1503 01/21/21  0824 09/17/20  0000 09/04/18  0000    138 140 138   POTASSIUM 3.9 4.2 4.2 3.9   CHLORIDE 107 106 104 103   CO2 30 30  --  28   BUN 21 20 21 21   CR 0.77 0.90 0.85 0.86   ANIONGAP 3 2* 30 7   DANY 9.1 8.9 9.5 9.7   GLC 96 97 91 97     Most Recent 2 LFT's:Recent Labs   Lab Test 09/17/20  0000 09/04/18  0000 08/21/17  0000   AST  --  21 23   ALT 24 27 25   ALKPHOS  --  67 84   BILITOTAL  --  1 0.8     Most Recent 3 Troponin's:Recent Labs   Lab Test 07/28/21  0431  07/28/21  0051 07/27/21  2253   TROPONIN <0.015 <0.015 <0.015   ,   Results for orders placed or performed during the hospital encounter of 07/27/21   XR Chest 2 Views    Narrative    CHEST TWO VIEWS  7/27/2021 3:15 PM     HISTORY:  Chest pain.    COMPARISON: None available.      Impression    IMPRESSION: PA and lateral views of the chest were obtained.  Cardiomediastinal silhouette is within normal limits. No suspicious  focal pulmonary opacities. No significant pleural effusion or  pneumothorax.    LINDA BUI MD         SYSTEM ID:  [id number]   Cardiac Catheterization    Narrative      Ost Cx to Prox Cx lesion is 50% stenosed.    1st Mrg lesion is 95% stenosed.    Unsuccessful PCI of OM-1. (true OM-1 trivial branch called 90%   previously) This is new significant occlusion    Recommend medical management            Discharge Medications   Current Discharge Medication List      START taking these medications    Details   losartan (COZAAR) 50 MG tablet Take 1 tablet (50 mg) by mouth daily  Qty: 30 tablet, Refills: 0    Associated Diagnoses: Coronary artery disease involving native coronary artery of native heart without angina pectoris         CONTINUE these medications which have CHANGED    Details   amLODIPine (NORVASC) 5 MG tablet Take 1 tablet (5 mg) by mouth daily  Qty: 30 tablet, Refills: 0    Associated Diagnoses: Coronary artery disease involving native coronary artery of native heart without angina pectoris         CONTINUE these medications which have NOT CHANGED    Details   ASPIRIN EC PO Take 81 mg by mouth daily      atorvastatin (LIPITOR) 10 MG tablet Take 1 tablet (10 mg) by mouth every other day  Qty: 45 tablet, Refills: 2    Associated Diagnoses: Coronary artery disease involving native coronary artery of native heart without angina pectoris; Pure hypercholesterolemia      Flaxseed, Linseed, (FLAX SEED OIL) 1000 MG capsule Take 1 capsule by mouth daily      glucosamine-chondroitin 500-400 MG  CAPS Take 2 capsules by mouth daily       hydrocortisone (WESTCORT) 0.2 % cream Apply topically 2 times daily as needed       loratadine (CLARITIN) 10 MG capsule Take 10 mg by mouth daily      Lysine 500 MG TABS Take 1 tablet by mouth daily.      magnesium 500 MG TABS Take 500 mg by mouth every evening      MINOCYCLINE HCL PO Take 100 mg by mouth daily as needed (acne)       Multiple Vitamin (MULTI-VITAMIN) per tablet Take 1 tablet by mouth daily.      naproxen sodium (ANAPROX) 220 MG tablet Take by mouth 2 times daily as needed for moderate pain      nitroGLYcerin (NITROSTAT) 0.4 MG sublingual tablet Place 1 tablet (0.4 mg) under the tongue every 5 minutes as needed for chest pain Administer every 5 minutes as needed.  Maximum 3 doses in 15 minutes.  Qty: 30 tablet, Refills: 0    Associated Diagnoses: Coronary artery disease involving native coronary artery of native heart with unstable angina pectoris (H)         STOP taking these medications       losartan-hydrochlorothiazide (HYZAAR) 100-25 MG per tablet Comments:   Reason for Stopping:         Potassium Gluconate 595 (99 K) MG TABS Comments:   Reason for Stopping:             Allergies   Allergies   Allergen Reactions     Seasonal Allergies      Hydrocodone-Acetaminophen Other (See Comments)     FEELS WIRED  FEELS WIRED  Hyperactive, insomnia.        [employer]

## 2021-07-28 NOTE — PROGRESS NOTES
PRIMARY DIAGNOSIS: CHEST PAIN  OUTPATIENT/OBSERVATION GOALS TO BE MET BEFORE DISCHARGE:  1. Ruled out acute coronary syndrome (negative or stable Troponin):  serial troponin in process, 2x negative  2. Pain Status: chest pain 1/10, has improved  3. Appropriate provocative testing performed: No  - Stress Test Procedure: planned angiogram  - Interpretation of cardiac rhythm per telemetry tech: NSR    4. Cleared by Consultants (if applicable):No  5. Return to near baseline physical activity: Yes  Discharge Planner Nurse   Safe discharge environment identified: Yes  Barriers to discharge: Yes       Entered by: Yann Gonzalez 07/28/2021 5:16 AM     Please review provider order for any additional goals.   Nurse to notify provider when observation goals have been met and patient is ready for discharge.

## 2021-07-28 NOTE — PRE-PROCEDURE
GENERAL PRE-PROCEDURE:   Procedure:  Coronary angiogram, possible intervention  Date/Time:  7/28/2021 1:41 PM    Verbal consent obtained?: Yes    Risks and benefits: Risks, benefits and alternatives were discussed    Consent given by:  Patient  Patient states understanding of procedure being performed: Yes    Patient's understanding of procedure matches consent: Yes    Procedure consent matches procedure scheduled: Yes    Expected level of sedation:  Moderate  Appropriately NPO:  Yes  Mallampati  :  Grade 1- soft palate, uvula, tonsillar pillars, and posterior pharyngeal wall visible  Lungs:  Lungs clear with good breath sounds bilaterally  Heart:  Normal heart sounds and rate  History & Physical reviewed:  History and physical reviewed and no updates needed  Statement of review:  I have reviewed the lab findings, diagnostic data, medications, and the plan for sedation

## 2021-07-28 NOTE — PLAN OF CARE
"PRIMARY DIAGNOSIS: CHEST PAIN  OUTPATIENT/OBSERVATION GOALS TO BE MET BEFORE DISCHARGE:  1. Ruled out acute coronary syndrome (negative or stable Troponin):  Yes  2. Pain Status: Improved with use of alternative comfort measures i.e.: positioning  3. Appropriate provocative testing performed: No  - Stress Test Procedure: Echo  - Interpretation of cardiac rhythm per telemetry tech: NSR    4. Cleared by Consultants (if applicable):No  5. Return to near baseline physical activity: No  Discharge Planner Nurse   Safe discharge environment identified: Yes  Barriers to discharge: Yes       Entered by: Chris Stafford 07/28/2021 12:45 PM     /72 (BP Location: Left arm)   Pulse 65   Temp 97  F (36.1  C) (Oral)   Resp 18   Ht 1.803 m (5' 11\")   Wt 79.5 kg (175 lb 4.8 oz)   SpO2 100%   BMI 24.45 kg/m     "

## 2021-07-28 NOTE — PLAN OF CARE
"PRIMARY DIAGNOSIS: CHEST PAIN  OUTPATIENT/OBSERVATION GOALS TO BE MET BEFORE DISCHARGE:  1. Ruled out acute coronary syndrome (negative or stable Troponin):  Yes  2. Pain Status: Improved with use of alternative comfort measures i.e.: positioning  3. Appropriate provocative testing performed: No  - Stress Test Procedure: Echo  - Interpretation of cardiac rhythm per telemetry tech: NSR    4. Cleared by Consultants (if applicable):No  5. Return to near baseline physical activity: No  Discharge Planner Nurse   Safe discharge environment identified: Yes  Barriers to discharge: Yes       Entered by: Chris Stafford 07/28/2021 10:16 AM     Please review provider order for any additional goals.   Nurse to notify provider when observation goals have been met and patient is ready for discharge.  /75   Pulse 61   Temp 97.5  F (36.4  C) (Oral)   Resp 18   Ht 1.803 m (5' 11\")   Wt 79.5 kg (175 lb 4.8 oz)   SpO2 100%   BMI 24.45 kg/m     "

## 2021-07-28 NOTE — PROVIDER NOTIFICATION
MD Notification    Notified Person: MD    Notified Person Name: Dr. Delgado     Notification Date/Time: 7/28/2021 4:40pm     Notification Interaction: web page     Purpose of Notification: Pt and wife wondering about discharge plan. Is pt able to discharge tonight or will we be doing med management overnight? Please advise thanks MR RN    Orders Received: OK with discharge later today, will suggest meds to be given at discharge either filled here or sent to pharmacy    Comments:

## 2021-07-28 NOTE — PROGRESS NOTES
PRIMARY DIAGNOSIS: CHEST PAIN  OUTPATIENT/OBSERVATION GOALS TO BE MET BEFORE DISCHARGE:  1. Ruled out acute coronary syndrome (negative or stable Troponin):  serial troponin in process, 2x negative  2. Pain Status: chest pain 1/10, has improved  3. Appropriate provocative testing performed: No  - Stress Test Procedure: planned angiogram  - Interpretation of cardiac rhythm per telemetry tech: NSR    4. Cleared by Consultants (if applicable):No  5. Return to near baseline physical activity: Yes  Discharge Planner Nurse   Safe discharge environment identified: Yes  Barriers to discharge: Yes       Entered by: Latesha Cota 07/27/2021 11:55 PM     Please review provider order for any additional goals.   Nurse to notify provider when observation goals have been met and patient is ready for discharge.

## 2021-07-28 NOTE — PLAN OF CARE
PRIMARY DIAGNOSIS: CHEST PAIN  OUTPATIENT/OBSERVATION GOALS TO BE MET BEFORE DISCHARGE:  1. Ruled out acute coronary syndrome (negative or stable Troponin):  Yes  2. Pain Status: Pain free.  3. Appropriate provocative testing performed: Yes  - Stress Test Procedure: Echo  - Interpretation of cardiac rhythm per telemetry tech: NSR    4. Cleared by Consultants (if applicable): Yes   5. Return to near baseline physical activity: Yes  Discharge Planner Nurse   Safe discharge environment identified: Yes  Barriers to discharge: No        Entered by: Chanelle Drummond 07/28/2021 6:39 PM     Please review provider order for any additional goals.   Nurse to notify provider when observation goals have been met and patient is ready for discharge.

## 2021-07-28 NOTE — CONSULTS
[facility]  Cardiology Consultation     Date of Admission:  7/27/2021    Assessment & Plan   Chris Barriga is a 60 year old male with    1.  Coronary artery disease status post PCI of proximal LAD in 2012, also noted to have 30-40% proximal circumflex and 90% stenosis of the small OM1  2.  Hyperlipidemia-LDL 50 mg/dL    Plan  1.  We will perform coronary angiogram.  Risks and benefits of left heart catheterization and coronary angiogram were discussed with the patient in detail.  Benefit and alternative options were discussed. Patient was informed about the risk of the procedure (not limited to) MI, CVA, death, acute renal failure -requiring dialysis, bleeding complication that may require blood products transfusion, Emergency surgery which may include coronary artery bypass grafting and or the risk of contrast allergy.Alternatives to performing coronary angiogram were also discussed.  Patient understands and wishes to proceed with [facility].    2.  Continue all current medications.    Adonay Delgado    Code Status    Full Code    Reason for Consult   Reason for consult: Chest pain    Primary Care Physician   Bharathi Chowdary    Chief Complaint   Chest pain.    History of Present Illness   Chris Barriga is a 60 year old male with past medical history of PCI of proximal LAD in 2012 who admitted to the hospital from cardiology clinic for positive stress test and chest pain.   He he had good exercise tolerance and was able to exercise for 12 minutes but did develop chest pain towards the end of the study.  The imaging portion was positive for ischemia in the anterolateral region.    Of note, during the angiogram in 2012 he was also noted to have 30-40% proximal circumflex and 90% blockage in the OM which was a small vessel and hence medical management was recommended.  He has done fairly well since until few months ago when he started noticing exertional chest pain.    Work-up in the  emergency department showed normal blood work, normal EKG.  Her LDL was 55 mg/dL.  An echocardiogram in the past and showed normal wall motion and no significant valvular disease.    Patient Active Problem List   Diagnosis     Pain in joint, lower leg     Other postprocedural status(V45.89)     Iliac artery aneurysm, left (H)     Coronary artery disease involving native coronary artery of native heart without angina pectoris     Benign essential hypertension     Pure hypercholesterolemia     Aortic root dilation (H)     Ischemic cardiomyopathy     Chest pain     Abnormal cardiovascular stress test     Hx of cardiac catheterization       Past Medical History   I have reviewed this patient's medical history and updated it with pertinent information if needed.   Past Medical History:   Diagnosis Date     Adenoma of ascending colon      Aortic root dilation (H)      Chest pain      Coronary artery disease     5/2012: Stent to proximal LAD for unstable angina. Proximal left circumflex 30-40%. EF 60%     HTN (hypertension)      Hyperlipidaemia      Iliac artery aneurysm, left (H)        Past Surgical History   I have reviewed this patient's surgical history and updated it with pertinent information if needed.  Past Surgical History:   Procedure Laterality Date     APPENDECTOMY OPEN       ARTHROSCOPY KNEE BILATERAL      both knees     ARTHROSCOPY SHOULDER      left     C ORAL SURGERY PROCEDURE       CARDIAC SURGERY      stent x 1     COLONOSCOPY N/A 2/17/2017    Procedure: COLONOSCOPY;  Surgeon: Junie Grier MD;  Location:  OR     COLONOSCOPY  12/2016    [facility] in [place]     COLONOSCOPY  05/22/2017    Dr. Grier [facility]     COLONOSCOPY N/A 5/29/2018    Procedure: COLONOSCOPY;  colonoscopy (crsal);  Surgeon: Junie Grier MD;  Location:  GI     LAPAROSCOPIC HERNIORRHAPHY INGUINAL  12/30/2013    Procedure: LAPAROSCOPIC HERNIORRHAPHY INGUINAL;  LAPAROSCOPIC RIGHT INGUINAL HERNIA REPAIR WITH MESH;   Surgeon: Reji Pizano MD;  Location: [facility]     ORTHOPEDIC SURGERY      scope of both shoulders     VASECTOMY         Prior to Admission Medications   Prior to Admission Medications   Prescriptions Last Dose Informant Patient Reported? Taking?   ASPIRIN EC PO 7/27/2021 at Unknown time Self Yes Yes   Sig: Take 81 mg by mouth daily   Flaxseed, Linseed, (FLAX SEED OIL) 1000 MG capsule 7/26/2021 at Unknown time Self Yes Yes   Sig: Take 1 capsule by mouth daily   Lysine 500 MG TABS 7/26/2021 at Unknown time Self Yes Yes   Sig: Take 1 tablet by mouth daily.   MINOCYCLINE HCL PO 7/27/2021 at Unknown time Self Yes Yes   Sig: Take 100 mg by mouth daily as needed (acne)    Multiple Vitamin (MULTI-VITAMIN) per tablet 7/26/2021 at Unknown time Self Yes Yes   Sig: Take 1 tablet by mouth daily.   Potassium Gluconate 595 (99 K) MG TABS 7/26/2021 at Unknown time Self Yes Yes   Sig: Take 1 tablet by mouth daily   amLODIPine (NORVASC) 5 MG tablet 7/27/2021 at Unknown time  No Yes   Sig: Take 2.5mg (1/2 tab) daily   atorvastatin (LIPITOR) 10 MG tablet 7/27/2021 at Unknown time  No Yes   Sig: Take 1 tablet (10 mg) by mouth every other day   glucosamine-chondroitin 500-400 MG CAPS 7/26/2021 at Unknown time Self Yes Yes   Sig: Take 2 capsules by mouth daily    hydrocortisone (WESTCORT) 0.2 % cream prn med Self Yes Yes   Sig: Apply topically 2 times daily as needed    loratadine (CLARITIN) 10 MG capsule 7/27/2021 at Unknown time  Yes Yes   Sig: Take 10 mg by mouth daily   losartan-hydrochlorothiazide (HYZAAR) 100-25 MG per tablet 7/27/2021 at Unknown time Self Yes Yes   Sig: Take 1 tablet by mouth daily   magnesium 500 MG TABS 7/26/2021 at Unknown time Self Yes Yes   Sig: Take 500 mg by mouth every evening   naproxen sodium (ANAPROX) 220 MG tablet prn med  Yes Yes   Sig: Take by mouth 2 times daily as needed for moderate pain   nitroGLYcerin (NITROSTAT) 0.4 MG sublingual tablet prn med  No Yes   Sig: Place 1 tablet (0.4 mg) under the  tongue every 5 minutes as needed for chest pain Administer every 5 minutes as needed.  Maximum 3 doses in 15 minutes.      Facility-Administered Medications: None     Current Facility-Administered Medications   Medication Dose Route Frequency     amLODIPine  2.5 mg Oral Daily     aspirin  81 mg Oral Daily     [START ON 7/29/2021] atorvastatin  10 mg Oral Every Other Day     loratadine  10 mg Oral Daily     losartan  100 mg Oral Daily     Current Facility-Administered Medications   Medication Last Rate     Allergies   Allergies   Allergen Reactions     Seasonal Allergies      Hydrocodone-Acetaminophen Other (See Comments)     FEELS WIRED  FEELS WIRED  Hyperactive, insomnia.        Molds & Smuts        Social History    reports that he has never smoked. He has never used smokeless tobacco. He reports current alcohol use. He reports that he does not use drugs.    Family History   Family History   Problem Relation Age of Onset     Breast Cancer Mother      Cerebrovascular Disease Father      Breast Cancer Maternal Grandmother      Breast Cancer Son      Colon Cancer No family hx of        Review of Systems   The comprehensive 10 point Review of Systems is negative other than noted in the HPI or here.     Physical Exam   Temp: 97  F (36.1  C) Temp src: Oral BP: 129/72 Pulse: 65   Resp: 18 SpO2: 100 % O2 Device: None (Room air)    Vital Signs with Ranges  Temp:  [95.8  F (35.4  C)-98.6  F (37  C)] 97  F (36.1  C)  Pulse:  [61-86] 65  Resp:  [10-30] 18  BP: (111-144)/(67-91) 129/72  SpO2:  [96 %-100 %] 100 %  175 lbs 4.8 oz    Constitutional: Alert, no apparent distress,    Lungs: Normal bilateral breath sounds   Cardiovascular: Regular rate and rhythm, normal S1 and S2, and no murmur   Lymphm node  Neck No enlargement  No jugular vein extension or carotid bruit   Skin: Normal   Extremity: No edema       Data   Results for orders placed or performed during the hospital encounter of 07/27/21 (from the past 24 hour(s))   CBC  with platelets differential    Narrative    The following orders were created for panel order CBC with platelets differential.  Procedure                               Abnormality         Status                     ---------                               -----------         ------                     CBC with platelets and d...[[id number]]                      Final result                 Please view results for these tests on the individual orders.   Basic metabolic panel   Result Value Ref Range    Sodium 140 133 - 144 mmol/L    Potassium 3.9 3.4 - 5.3 mmol/L    Chloride 107 94 - 109 mmol/L    Carbon Dioxide (CO2) 30 20 - 32 mmol/L    Anion Gap 3 3 - 14 mmol/L    Urea Nitrogen 21 7 - 30 mg/dL    Creatinine 0.77 0.66 - 1.25 mg/dL    Calcium 9.1 8.5 - 10.1 mg/dL    Glucose 96 70 - 99 mg/dL    GFR Estimate >90 >60 mL/min/1.73m2   Troponin I   Result Value Ref Range    Troponin I <0.015 0.000 - 0.045 ug/L   CBC with platelets and differential   Result Value Ref Range    WBC Count 5.8 4.0 - 11.0 10e3/uL    RBC Count 4.77 4.40 - 5.90 10e6/uL    Hemoglobin 15.3 13.3 - 17.7 g/dL    Hematocrit 45.1 40.0 - 53.0 %    MCV 95 78 - 100 fL    MCH 32.1 26.5 - 33.0 pg    MCHC 33.9 31.5 - 36.5 g/dL    RDW 13.2 10.0 - 15.0 %    Platelet Count 187 150 - 450 10e3/uL    % Neutrophils 73 %    % Lymphocytes 19 %    % Monocytes 6 %    % Eosinophils 1 %    % Basophils 1 %    % Immature Granulocytes 0 %    NRBCs per 100 WBC 0 <1 /100    Absolute Neutrophils 4.2 1.6 - 8.3 10e3/uL    Absolute Lymphocytes 1.1 0.8 - 5.3 10e3/uL    Absolute Monocytes 0.4 0.0 - 1.3 10e3/uL    Absolute Eosinophils 0.1 0.0 - 0.7 10e3/uL    Absolute Basophils 0.0 0.0 - 0.2 10e3/uL    Absolute Immature Granulocytes 0.0 <=0.0 10e3/uL    Absolute NRBCs 0.0 10e3/uL   XR Chest 2 Views    Narrative    CHEST TWO VIEWS  7/27/2021 3:15 PM     HISTORY:  Chest pain.    COMPARISON: None available.      Impression    IMPRESSION: PA and lateral views of the chest were  obtained.  Cardiomediastinal silhouette is within normal limits. No suspicious  focal pulmonary opacities. No significant pleural effusion or  pneumothorax.    LINDA BUI MD         SYSTEM ID:  [id number]   EKG 12-lead, tracing only   Result Value Ref Range    Systolic Blood Pressure  mmHg    Diastolic Blood Pressure  mmHg    Ventricular Rate 62 BPM    Atrial Rate 62 BPM    MI Interval 168 ms    QRS Duration 96 ms     ms    QTc 422 ms    P Axis 53 degrees    R AXIS 40 degrees    T Axis 47 degrees    Interpretation ECG       Sinus rhythm  Normal ECG  When compared with ECG of 27-JUL-2021 12:19,  No significant change was found  Confirmed by GENERATED REPORT, COMPUTER ([id number]),  ANA PONCE ([id number]) on 7/27/2021 5:05:30 PM     Asymptomatic COVID-19 Virus (Coronavirus) by PCR Nasopharyngeal    Specimen: Nasopharyngeal; Swab    Narrative    The following orders were created for panel order Asymptomatic COVID-19 Virus (Coronavirus) by PCR Nasopharyngeal.  Procedure                               Abnormality         Status                     ---------                               -----------         ------                     SARS-COV2 (COVID-19) Vir...[[id number]]  Normal              Final result                 Please view results for these tests on the individual orders.   SARS-COV2 (COVID-19) Virus RT-PCR    Specimen: Nasopharyngeal; Swab   Result Value Ref Range    SARS CoV2 PCR Negative Negative    Narrative    Testing was performed using the ana  SARS-CoV-2 & Influenza A/B Assay on the ana  Maria  System.  This test should be ordered for the detection of SARS-COV-2 in individuals who meet SARS-CoV-2 clinical and/or epidemiological criteria. Test performance is unknown in asymptomatic patients.  This test is for in vitro diagnostic use under the FDA EUA for laboratories certified under CLIA to perform moderate and/or high complexity testing. This test has not been FDA cleared or  approved.  A negative test does not rule out the presence of PCR inhibitors in the specimen or target RNA in concentration below the limit of detection for the assay. The possibility of a false negative should be considered if the patient's recent exposure or clinical presentation suggests COVID-19.  [facility] are certified under the Clinical Laboratory Improvement Amendments of 1988 (CLIA-88) as qualified to perform moderate and/or high complexity laboratory testing.   Troponin I - Now then in 4 hours x 3   Result Value Ref Range    Troponin I <0.015 0.000 - 0.045 ug/L   Troponin I - Now then in 4 hours x 3   Result Value Ref Range    Troponin I <0.015 0.000 - 0.045 ug/L   Troponin I - Now then in 4 hours x 3   Result Value Ref Range    Troponin I <0.015 0.000 - 0.045 ug/L   Lipid panel reflex to direct LDL   Result Value Ref Range    Cholesterol 155 <200 mg/dL    Triglycerides 67 <150 mg/dL    Direct Measure HDL 92 >=40 mg/dL    LDL Cholesterol Calculated 50 <=100 mg/dL    Non HDL Cholesterol 63 <130 mg/dL    Patient Fasting > 8hrs? Yes    Hemoglobin A1c   Result Value Ref Range    Hemoglobin A1C 5.0 0.0 - 5.6 %

## 2021-07-28 NOTE — PROVIDER NOTIFICATION
MD Notification    Notified Person: PA    Notified Person Name: Gloria Rider     Notification Date/Time: 7/28/21 4:34pm     Notification Interaction: web page     Purpose of Notification: Pt back from angiogram, no intervention. Plan for bedrest for the next hour- hour and a half. Pt had questions about discharge. Is pt able to discharge later today? MR RN     Orders Received:    Comments:

## 2021-07-29 ENCOUNTER — TELEPHONE (OUTPATIENT)
Dept: CARDIOLOGY | Facility: CLINIC | Age: 61
End: 2021-07-29

## 2021-07-29 ENCOUNTER — MYC MEDICAL ADVICE (OUTPATIENT)
Dept: CARDIOLOGY | Facility: CLINIC | Age: 61
End: 2021-07-29

## 2021-07-29 DIAGNOSIS — I25.10 CORONARY ARTERY DISEASE INVOLVING NATIVE CORONARY ARTERY OF NATIVE HEART WITHOUT ANGINA PECTORIS: Primary | ICD-10-CM

## 2021-07-29 LAB
ACT BLD: 271 SECONDS (ref 74–150)
ACT BLD: 276 SECONDS (ref 74–150)

## 2021-07-29 RX ORDER — ISOSORBIDE MONONITRATE 30 MG/1
30 TABLET, EXTENDED RELEASE ORAL DAILY
Qty: 90 TABLET | Refills: 3 | Status: SHIPPED | OUTPATIENT
Start: 2021-07-29 | End: 2021-08-18
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
## 2021-07-29 NOTE — TELEPHONE ENCOUNTER
"Patient was evaluated by cardiology chest discomfort concerning for unstable angina, with short episode of resting pain prior evening. OP abnormal stress echocardiogram, concerning for ischemia in the OM/diagonal distribution. Recommended to go to ED for coronary angiogram. PMH: CAD with REI LAD in 2012 with known severe OM disease at that time, HLD. 7/28/21: Coronary angiogram via RFA resulted in unsuccessful PCI to OM secondary to inability to pass wire across the lesion in the OM due to significant tortuosity. Decided to medically manage at this time and may consider repeat attempt at revascularization if anginal symptoms do not resolve with antianginals. PTA Norvasc dosage increased and started on Losartan. PTA Hyzaar discontinued. Called patient to discuss any post hospital d/c questions he may have, review medication changes, and confirm f/u appts. Pt strongly vocalizing his frustrations with the post discharge plan of care and poor communication during his hospital stay. Please see Luci Rodriguez RN's notes from today. Writer reviewed coronary angiogram results with pt-coronary artery anatomy reviewed. Pt appreciative of information. Pt continues to have \"continual\" 2/10 chest pain that only temporarily goes away with NTG x one-\"for about 1.5 hrs, then it returns.\" Denies nausea, SOB or diaphoresis. Describes pain as dull, mid sternal and increases with activity. Rates pain as 2/10 now. Instructed pt that he should go to ED again for further evaluation, but pt states, \"I left the hospital with this same pain and it's the same as when I talked with GERONIMO Junie Matthew on the day I was admitted. I have had no relief of my symptoms with the Norvasc that they started me on in the hospital. If I go to the ER, I will be there for 8 hrs and have the same tests done with the same results and no further answers.\" Writer provided 1:1 listening and acknowledged his feelings of frustration. Again encouraged pt to go to " ED, but I am doubtful he will do this. Regardless, writer did schedule pt to see GERONIMO Junie Matthew tomorrow at 1410 in case he chooses not to go to ED for review of plan of care, symptoms and medication management. DEBBY Rios RN.

## 2021-07-29 NOTE — TELEPHONE ENCOUNTER
My chart message from patient:  Yumiko:   An additional request - I sent Dr. Park a follow up message earlier today from my angiogram yesterday.  Can you please make sure he and Junie Vipul receive it as soon as possible?  I would like to schedule a follow up appointment with him ASAP considering another blockage was discovered yesterday and I am very dissatisfied with my overall care while in the hospital.  Thanks.     Chris Barrigaue [phone]       Reply to patient:    Demetrius, Mr. Barriga,    Yes, we sent your message to both Dr. Park and Junie Matthew. Dr. Park is on vacation, so we will update you as soon as he has sent us his thoughts for follow up.  Junie is off today, so her recommendations will also have to wait a day. Our usual protocol is to have our patient's see the GERONIMO 7-10 days post-cath. That order is ready if you want to pick a date today.  Scheduling is at [phone].    Team 2 RNs  [phone]

## 2021-07-30 ENCOUNTER — HOSPITAL ENCOUNTER (INPATIENT)
Facility: CLINIC | Age: 61
LOS: 1 days | Discharge: HOME | DRG: 246 | End: 2021-07-31
Admitting: INTERNAL MEDICINE
Payer: COMMERCIAL

## 2021-07-30 ENCOUNTER — OFFICE VISIT (OUTPATIENT)
Dept: CARDIOLOGY | Facility: CLINIC | Age: 61
End: 2021-07-30
Attending: INTERNAL MEDICINE
Payer: COMMERCIAL

## 2021-07-30 VITALS
HEIGHT: 71 IN | HEART RATE: 72 BPM | BODY MASS INDEX: 25.06 KG/M2 | WEIGHT: 179 LBS | DIASTOLIC BLOOD PRESSURE: 70 MMHG | SYSTOLIC BLOOD PRESSURE: 126 MMHG | OXYGEN SATURATION: 99 %

## 2021-07-30 DIAGNOSIS — R93.1 ABNORMAL FINDINGS ON DIAGNOSTIC IMAGING OF HEART AND CORONARY CIRCULATION: ICD-10-CM

## 2021-07-30 DIAGNOSIS — I25.10 CORONARY ARTERY DISEASE INVOLVING NATIVE CORONARY ARTERY OF NATIVE HEART WITHOUT ANGINA PECTORIS: ICD-10-CM

## 2021-07-30 DIAGNOSIS — R93.1 ABNORMAL FINDINGS ON DIAGNOSTIC IMAGING OF HEART AND CORONARY CIRCULATION: Primary | ICD-10-CM

## 2021-07-30 DIAGNOSIS — I25.10 CORONARY ARTERY DISEASE INVOLVING NATIVE CORONARY ARTERY OF NATIVE HEART WITHOUT ANGINA PECTORIS: Primary | ICD-10-CM

## 2021-07-30 DIAGNOSIS — R52 PAIN: ICD-10-CM

## 2021-07-30 PROBLEM — Z98.61 PERCUTANEOUS TRANSLUMINAL CORONARY ANGIOPLASTY STATUS: Status: ACTIVE | Noted: 2021-07-30

## 2021-07-30 LAB
ANION GAP SERPL CALCULATED.3IONS-SCNC: 1 MMOL/L (ref 3–14)
ATRIAL RATE - MUSE: 53 BPM
BUN SERPL-MCNC: 19 MG/DL (ref 7–30)
CALCIUM SERPL-MCNC: 8.8 MG/DL (ref 8.5–10.1)
CHLORIDE BLD-SCNC: 109 MMOL/L (ref 94–109)
CO2 SERPL-SCNC: 28 MMOL/L (ref 20–32)
CREAT SERPL-MCNC: 0.89 MG/DL (ref 0.66–1.25)
DIASTOLIC BLOOD PRESSURE - MUSE: NORMAL MMHG
ERYTHROCYTE [DISTWIDTH] IN BLOOD BY AUTOMATED COUNT: 13.2 % (ref 10–15)
GFR SERPL CREATININE-BSD FRML MDRD: >90 ML/MIN/1.73M2
GLUCOSE BLD-MCNC: 109 MG/DL (ref 70–99)
HCT VFR BLD AUTO: 41.8 % (ref 40–53)
HGB BLD-MCNC: 14.4 G/DL (ref 13.3–17.7)
INR PPP: 0.97 (ref 0.85–1.15)
INTERPRETATION ECG - MUSE: NORMAL
MCH RBC QN AUTO: 33 PG (ref 26.5–33)
MCHC RBC AUTO-ENTMCNC: 34.4 G/DL (ref 31.5–36.5)
MCV RBC AUTO: 96 FL (ref 78–100)
P AXIS - MUSE: 68 DEGREES
PLATELET # BLD AUTO: 167 10E3/UL (ref 150–450)
POTASSIUM BLD-SCNC: 4.8 MMOL/L (ref 3.4–5.3)
PR INTERVAL - MUSE: 180 MS
QRS DURATION - MUSE: 114 MS
QT - MUSE: 434 MS
QTC - MUSE: 407 MS
R AXIS - MUSE: 68 DEGREES
RBC # BLD AUTO: 4.37 10E6/UL (ref 4.4–5.9)
SODIUM SERPL-SCNC: 138 MMOL/L (ref 133–144)
SYSTOLIC BLOOD PRESSURE - MUSE: NORMAL MMHG
T AXIS - MUSE: 60 DEGREES
VENTRICULAR RATE- MUSE: 53 BPM
WBC # BLD AUTO: 5.9 10E3/UL (ref 4–11)

## 2021-07-30 PROCEDURE — C1894 INTRO/SHEATH, NON-LASER: HCPCS | Performed by: INTERNAL MEDICINE

## 2021-07-30 PROCEDURE — 85347 COAGULATION TIME ACTIVATED: CPT

## 2021-07-30 PROCEDURE — 85610 PROTHROMBIN TIME: CPT | Performed by: PHYSICIAN ASSISTANT

## 2021-07-30 PROCEDURE — 92929 PR PRQ TRLUML CORONARY BM STENT W/ANGIO ADDL ART/BRNCH: CPT | Mod: LC | Performed by: INTERNAL MEDICINE

## 2021-07-30 PROCEDURE — 250N000009 HC RX 250: Performed by: PHYSICIAN ASSISTANT

## 2021-07-30 PROCEDURE — 999N000184 HC STATISTIC TELEMETRY

## 2021-07-30 PROCEDURE — 92928 PRQ TCAT PLMT NTRAC ST 1 LES: CPT | Mod: LC | Performed by: INTERNAL MEDICINE

## 2021-07-30 PROCEDURE — 999N000071 HC STATISTIC HEART CATH LAB OR EP LAB

## 2021-07-30 PROCEDURE — C1874 STENT, COATED/COV W/DEL SYS: HCPCS | Performed by: INTERNAL MEDICINE

## 2021-07-30 PROCEDURE — 99215 OFFICE O/P EST HI 40 MIN: CPT | Performed by: PHYSICIAN ASSISTANT

## 2021-07-30 PROCEDURE — C9600 PERC DRUG-EL COR STENT SING: HCPCS | Mod: LC | Performed by: INTERNAL MEDICINE

## 2021-07-30 PROCEDURE — C1753 CATH, INTRAVAS ULTRASOUND: HCPCS | Performed by: INTERNAL MEDICINE

## 2021-07-30 PROCEDURE — 250N000013 HC RX MED GY IP 250 OP 250 PS 637: Performed by: PHYSICIAN ASSISTANT

## 2021-07-30 PROCEDURE — 93005 ELECTROCARDIOGRAM TRACING: CPT

## 2021-07-30 PROCEDURE — C1887 CATHETER, GUIDING: HCPCS | Performed by: INTERNAL MEDICINE

## 2021-07-30 PROCEDURE — 92979 ENDOLUMINL IVUS OCT C EA: CPT | Performed by: INTERNAL MEDICINE

## 2021-07-30 PROCEDURE — 272N000001 HC OR GENERAL SUPPLY STERILE: Performed by: INTERNAL MEDICINE

## 2021-07-30 PROCEDURE — C1760 CLOSURE DEV, VASC: HCPCS | Performed by: INTERNAL MEDICINE

## 2021-07-30 PROCEDURE — 250N000011 HC RX IP 250 OP 636: Performed by: INTERNAL MEDICINE

## 2021-07-30 PROCEDURE — 250N000013 HC RX MED GY IP 250 OP 250 PS 637: Performed by: INTERNAL MEDICINE

## 2021-07-30 PROCEDURE — 92979 ENDOLUMINL IVUS OCT C EA: CPT | Mod: LC | Performed by: INTERNAL MEDICINE

## 2021-07-30 PROCEDURE — 80048 BASIC METABOLIC PNL TOTAL CA: CPT | Performed by: PHYSICIAN ASSISTANT

## 2021-07-30 PROCEDURE — 0271376 DILATION OF CORONARY ARTERY, TWO ARTERIES, BIFURCATION, WITH FOUR OR MORE DRUG-ELUTING INTRALUMINAL DEVICES, PERCUTANEOUS APPROACH: ICD-10-PCS | Performed by: INTERNAL MEDICINE

## 2021-07-30 PROCEDURE — 85027 COMPLETE CBC AUTOMATED: CPT | Performed by: PHYSICIAN ASSISTANT

## 2021-07-30 PROCEDURE — C9601 PERC DRUG-EL COR STENT BRAN: HCPCS | Performed by: INTERNAL MEDICINE

## 2021-07-30 PROCEDURE — B2111ZZ FLUOROSCOPY OF MULTIPLE CORONARY ARTERIES USING LOW OSMOLAR CONTRAST: ICD-10-PCS | Performed by: INTERNAL MEDICINE

## 2021-07-30 PROCEDURE — 99222 1ST HOSP IP/OBS MODERATE 55: CPT | Mod: 25 | Performed by: INTERNAL MEDICINE

## 2021-07-30 PROCEDURE — 99153 MOD SED SAME PHYS/QHP EA: CPT | Performed by: INTERNAL MEDICINE

## 2021-07-30 PROCEDURE — U0005 INFEC AGEN DETEC AMPLI PROBE: HCPCS | Performed by: HOSPITALIST

## 2021-07-30 PROCEDURE — 92978 ENDOLUMINL IVUS OCT C 1ST: CPT | Mod: LC | Performed by: INTERNAL MEDICINE

## 2021-07-30 PROCEDURE — 210N000002 HC R&B HEART CARE

## 2021-07-30 PROCEDURE — 99152 MOD SED SAME PHYS/QHP 5/>YRS: CPT | Performed by: INTERNAL MEDICINE

## 2021-07-30 PROCEDURE — B240ZZ3 ULTRASONOGRAPHY OF SINGLE CORONARY ARTERY, INTRAVASCULAR: ICD-10-PCS | Performed by: INTERNAL MEDICINE

## 2021-07-30 PROCEDURE — C9607 PERC D-E COR REVASC CHRO SIN: HCPCS | Performed by: INTERNAL MEDICINE

## 2021-07-30 PROCEDURE — 99222 1ST HOSP IP/OBS MODERATE 55: CPT | Mod: AI | Performed by: HOSPITALIST

## 2021-07-30 PROCEDURE — C1769 GUIDE WIRE: HCPCS | Performed by: INTERNAL MEDICINE

## 2021-07-30 PROCEDURE — C1725 CATH, TRANSLUMIN NON-LASER: HCPCS | Performed by: INTERNAL MEDICINE

## 2021-07-30 PROCEDURE — 36415 COLL VENOUS BLD VENIPUNCTURE: CPT | Performed by: PHYSICIAN ASSISTANT

## 2021-07-30 PROCEDURE — 93000 ELECTROCARDIOGRAM COMPLETE: CPT | Performed by: PHYSICIAN ASSISTANT

## 2021-07-30 PROCEDURE — 258N000003 HC RX IP 258 OP 636: Performed by: PHYSICIAN ASSISTANT

## 2021-07-30 DEVICE — STENT RESOLUTE ONYX DE 2.7FR 3.50X22MM RONYX DE35022UX: Type: IMPLANTABLE DEVICE | Status: FUNCTIONAL

## 2021-07-30 DEVICE — STENT RESOLUTE ONYX DE 2.7FR 4.00X22MM RONYX DE40022UX: Type: IMPLANTABLE DEVICE | Status: FUNCTIONAL

## 2021-07-30 DEVICE — IMPLANTABLE DEVICE: Type: IMPLANTABLE DEVICE | Status: FUNCTIONAL

## 2021-07-30 RX ORDER — POTASSIUM CHLORIDE 1500 MG/1
20 TABLET, EXTENDED RELEASE ORAL
Status: CANCELLED | OUTPATIENT
Start: 2021-07-30

## 2021-07-30 RX ORDER — LIDOCAINE 40 MG/G
CREAM TOPICAL
Status: DISCONTINUED | OUTPATIENT
Start: 2021-07-30 | End: 2021-07-30 | Stop reason: HOSPADM

## 2021-07-30 RX ORDER — ACETAMINOPHEN 325 MG/1
650 TABLET ORAL EVERY 4 HOURS PRN
Status: DISCONTINUED | OUTPATIENT
Start: 2021-07-30 | End: 2021-07-31 | Stop reason: HOSPADM

## 2021-07-30 RX ORDER — FENTANYL CITRATE 50 UG/ML
25 INJECTION, SOLUTION INTRAMUSCULAR; INTRAVENOUS
Status: DISCONTINUED | OUTPATIENT
Start: 2021-07-30 | End: 2021-07-31 | Stop reason: HOSPADM

## 2021-07-30 RX ORDER — NALOXONE HYDROCHLORIDE 0.4 MG/ML
0.2 INJECTION, SOLUTION INTRAMUSCULAR; INTRAVENOUS; SUBCUTANEOUS
Status: ACTIVE | OUTPATIENT
Start: 2021-07-30 | End: 2021-07-31

## 2021-07-30 RX ORDER — LIDOCAINE 40 MG/G
CREAM TOPICAL
Status: CANCELLED | OUTPATIENT
Start: 2021-07-30

## 2021-07-30 RX ORDER — METOPROLOL TARTRATE 1 MG/ML
5-10 INJECTION, SOLUTION INTRAVENOUS
Status: DISCONTINUED | OUTPATIENT
Start: 2021-07-30 | End: 2021-07-31 | Stop reason: HOSPADM

## 2021-07-30 RX ORDER — NALOXONE HYDROCHLORIDE 0.4 MG/ML
0.4 INJECTION, SOLUTION INTRAMUSCULAR; INTRAVENOUS; SUBCUTANEOUS
Status: ACTIVE | OUTPATIENT
Start: 2021-07-30 | End: 2021-07-31

## 2021-07-30 RX ORDER — ONDANSETRON 4 MG/1
4 TABLET, ORALLY DISINTEGRATING ORAL EVERY 6 HOURS PRN
Status: DISCONTINUED | OUTPATIENT
Start: 2021-07-30 | End: 2021-07-31 | Stop reason: HOSPADM

## 2021-07-30 RX ORDER — ASPIRIN 325 MG
325 TABLET ORAL ONCE
Status: CANCELLED | OUTPATIENT
Start: 2021-07-30 | End: 2021-07-30

## 2021-07-30 RX ORDER — ASPIRIN 81 MG/1
243 TABLET, CHEWABLE ORAL ONCE
Status: COMPLETED | OUTPATIENT
Start: 2021-07-30 | End: 2021-07-30

## 2021-07-30 RX ORDER — PRASUGREL 5 MG/1
TABLET, FILM COATED ORAL
Status: DISCONTINUED | OUTPATIENT
Start: 2021-07-30 | End: 2021-07-30 | Stop reason: HOSPADM

## 2021-07-30 RX ORDER — FLUMAZENIL 0.1 MG/ML
0.2 INJECTION, SOLUTION INTRAVENOUS
Status: ACTIVE | OUTPATIENT
Start: 2021-07-30 | End: 2021-07-31

## 2021-07-30 RX ORDER — IOPAMIDOL 755 MG/ML
INJECTION, SOLUTION INTRAVASCULAR
Status: DISCONTINUED | OUTPATIENT
Start: 2021-07-30 | End: 2021-07-30 | Stop reason: HOSPADM

## 2021-07-30 RX ORDER — ONDANSETRON 2 MG/ML
4 INJECTION INTRAMUSCULAR; INTRAVENOUS EVERY 6 HOURS PRN
Status: DISCONTINUED | OUTPATIENT
Start: 2021-07-30 | End: 2021-07-31 | Stop reason: HOSPADM

## 2021-07-30 RX ORDER — ASPIRIN 325 MG
325 TABLET ORAL ONCE
Status: COMPLETED | OUTPATIENT
Start: 2021-07-30 | End: 2021-07-30

## 2021-07-30 RX ORDER — SODIUM CHLORIDE 9 MG/ML
INJECTION, SOLUTION INTRAVENOUS CONTINUOUS
Status: DISCONTINUED | OUTPATIENT
Start: 2021-07-30 | End: 2021-07-30 | Stop reason: HOSPADM

## 2021-07-30 RX ORDER — HYDRALAZINE HYDROCHLORIDE 20 MG/ML
10 INJECTION INTRAMUSCULAR; INTRAVENOUS EVERY 4 HOURS PRN
Status: DISCONTINUED | OUTPATIENT
Start: 2021-07-30 | End: 2021-07-31 | Stop reason: HOSPADM

## 2021-07-30 RX ORDER — ASPIRIN 81 MG/1
81 TABLET ORAL DAILY
Status: DISCONTINUED | OUTPATIENT
Start: 2021-07-31 | End: 2021-07-31 | Stop reason: HOSPADM

## 2021-07-30 RX ORDER — ASPIRIN 81 MG/1
243 TABLET, CHEWABLE ORAL ONCE
Status: CANCELLED | OUTPATIENT
Start: 2021-07-30

## 2021-07-30 RX ORDER — ATROPINE SULFATE 0.1 MG/ML
INJECTION INTRAVENOUS
Status: DISCONTINUED | OUTPATIENT
Start: 2021-07-30 | End: 2021-07-30 | Stop reason: HOSPADM

## 2021-07-30 RX ORDER — ATROPINE SULFATE 0.1 MG/ML
0.5 INJECTION INTRAVENOUS
Status: ACTIVE | OUTPATIENT
Start: 2021-07-30 | End: 2021-07-31

## 2021-07-30 RX ORDER — NITROGLYCERIN 0.4 MG/1
0.4 TABLET SUBLINGUAL EVERY 5 MIN PRN
Status: DISCONTINUED | OUTPATIENT
Start: 2021-07-30 | End: 2021-07-31 | Stop reason: HOSPADM

## 2021-07-30 RX ORDER — PRASUGREL 10 MG/1
10 TABLET, FILM COATED ORAL DAILY
Status: DISCONTINUED | OUTPATIENT
Start: 2021-07-31 | End: 2021-07-31 | Stop reason: HOSPADM

## 2021-07-30 RX ORDER — ASPIRIN 81 MG/1
81 TABLET, CHEWABLE ORAL ONCE
Status: DISCONTINUED | OUTPATIENT
Start: 2021-07-30 | End: 2021-07-30

## 2021-07-30 RX ORDER — FENTANYL CITRATE 50 UG/ML
INJECTION, SOLUTION INTRAMUSCULAR; INTRAVENOUS
Status: DISCONTINUED | OUTPATIENT
Start: 2021-07-30 | End: 2021-07-30 | Stop reason: HOSPADM

## 2021-07-30 RX ORDER — HEPARIN SODIUM 1000 [USP'U]/ML
INJECTION, SOLUTION INTRAVENOUS; SUBCUTANEOUS
Status: DISCONTINUED | OUTPATIENT
Start: 2021-07-30 | End: 2021-07-30 | Stop reason: HOSPADM

## 2021-07-30 RX ORDER — ROSUVASTATIN CALCIUM 20 MG/1
40 TABLET, COATED ORAL DAILY
Status: DISCONTINUED | OUTPATIENT
Start: 2021-07-30 | End: 2021-07-31 | Stop reason: HOSPADM

## 2021-07-30 RX ORDER — SODIUM CHLORIDE 9 MG/ML
INJECTION, SOLUTION INTRAVENOUS CONTINUOUS
Status: CANCELLED | OUTPATIENT
Start: 2021-07-30

## 2021-07-30 RX ORDER — SODIUM CHLORIDE 9 MG/ML
INJECTION, SOLUTION INTRAVENOUS CONTINUOUS
Status: ACTIVE | OUTPATIENT
Start: 2021-07-30 | End: 2021-07-30

## 2021-07-30 RX ORDER — ONDANSETRON 2 MG/ML
INJECTION INTRAMUSCULAR; INTRAVENOUS
Status: DISCONTINUED | OUTPATIENT
Start: 2021-07-30 | End: 2021-07-30 | Stop reason: HOSPADM

## 2021-07-30 RX ORDER — NITROGLYCERIN 5 MG/ML
VIAL (ML) INTRAVENOUS
Status: DISCONTINUED | OUTPATIENT
Start: 2021-07-30 | End: 2021-07-30 | Stop reason: HOSPADM

## 2021-07-30 RX ORDER — POTASSIUM CHLORIDE 1500 MG/1
20 TABLET, EXTENDED RELEASE ORAL
Status: DISCONTINUED | OUTPATIENT
Start: 2021-07-30 | End: 2021-07-30 | Stop reason: HOSPADM

## 2021-07-30 RX ORDER — FENTANYL CITRATE-0.9 % NACL/PF 10 MCG/ML
PLASTIC BAG, INJECTION (ML) INTRAVENOUS
Status: DISCONTINUED | OUTPATIENT
Start: 2021-07-30 | End: 2021-07-30 | Stop reason: HOSPADM

## 2021-07-30 RX ADMIN — ASPIRIN 325 MG ORAL TABLET 325 MG: 325 PILL ORAL at 13:18

## 2021-07-30 RX ADMIN — SODIUM CHLORIDE: 9 INJECTION, SOLUTION INTRAVENOUS at 12:42

## 2021-07-30 RX ADMIN — ROSUVASTATIN CALCIUM 40 MG: 20 TABLET, FILM COATED ORAL at 22:26

## 2021-07-30 ASSESSMENT — ACTIVITIES OF DAILY LIVING (ADL): ADLS_ACUITY_SCORE: 11

## 2021-07-30 ASSESSMENT — MIFFLIN-ST. JEOR: SCORE: 1649.07

## 2021-07-30 NOTE — Clinical Note
The first balloon was inserted into the circumflex.Max pressure = 12 judy. Total duration = 130 seconds.

## 2021-07-30 NOTE — Clinical Note
The first balloon was inserted into the circumflex.Max pressure = 12 judy. Total duration = 8 seconds.     Max pressure = 20 judy. Total duration = 23 seconds.    Balloon reinflated a second time: Max pressure = 20 judy. Total duration = 23 seconds.

## 2021-07-30 NOTE — Clinical Note
The first balloon was inserted into the circumflex.Max pressure = 15 judy. Total duration = 10 seconds.     Max pressure = 20 judy. Total duration = 11 seconds.    Balloon reinflated a second time: Max pressure = 20 judy. Total duration = 11 seconds.  Balloon reinflated a third time: Max pressure = 20 judy. Total duration = 7 seconds.

## 2021-07-30 NOTE — Clinical Note
Max pressure = 14 judy. Total duration = 14 seconds.     Max pressure = 20 judy. Total duration = 9 seconds.    Balloon reinflated a second time: Max pressure = 20 judy. Total duration = 9 seconds.  Balloon reinflated a third time: Max pressure = 20 judy. Total duration = 10 seconds.

## 2021-07-30 NOTE — PROGRESS NOTES
Care Suites Admission Nursing Note    Patient Information  Name: Chris Barriga  Age: 60 year old  Reason for admission: Left heart cath  Care Suites arrival time: 1200    Visitor Information  Name: Merle  Informed of visitor restrictions: Yes  1 visitor allowed per patient   Visitor must screen negative for COVID symptoms   Visitor must wear a mask  Waiting rooms closed to visitors    Patient Admission/Assessment   Pre-procedure assessment complete: Yes  If abnormal assessment/labs, provider notified: N/A  NPO: Yes  Medications held per instructions/orders: N/A  Consent: deferred to MD  Patient oriented to room: Yes  Education/questions answered: Yes  Plan/other: K+ and creat WDL. Pt did not take ASA so will administer 325 mg tablet    Discharge Planning  Discharge name/phone number: Merle spouse [phone]  Overnight post sedation caregiver: Merle  Discharge location: home    Lydia Luna RN

## 2021-07-30 NOTE — Clinical Note
Max pressure = 16 judy. Total duration = 20 seconds.     Max pressure = 20 judy. Total duration = 9 seconds.    Balloon reinflated a second time: Max pressure = 20 judy. Total duration = 9 seconds.  Balloon reinflated a third time: Max pressure = 20 judy. Total duration = 21 seconds.

## 2021-07-30 NOTE — Clinical Note
The first balloon was inserted into the circumflex and ostium circumflex.Max pressure = 20 judy. Total duration = 20 seconds.     Trapping.

## 2021-07-30 NOTE — Clinical Note
Max pressure = 16 judy. Total duration = 7 seconds.     Max pressure = 16 judy. Total duration = 7 seconds.    Balloon reinflated a second time: Max pressure = 16 judy. Total duration = 7 seconds.  Balloon reinflated a third time: Max pressure = 20 judy. Total duration = 7 seconds.

## 2021-07-30 NOTE — Clinical Note
The first balloon was inserted into the circumflex.Max pressure = 20 judy. Total duration = 27 seconds.

## 2021-07-30 NOTE — Clinical Note
The first balloon was inserted into the circumflex.Max pressure = 20 judy. Total duration = 18 seconds.

## 2021-07-30 NOTE — Clinical Note
Max pressure = 20 judy. Total duration = 16 seconds.     Max pressure = 20 judy. Total duration = 22 seconds.    Balloon reinflated a second time: Max pressure = 20 judy. Total duration = 22 seconds.  Balloon reinflated a third time: Max pressure = 20 judy. Total duration = 11 seconds.

## 2021-07-30 NOTE — PROGRESS NOTES
PATIENT/VISITOR WELLNESS SCREENING    Step 1 Patient Screening    1. In the last month, have you been in contact with someone who was confirmed or suspected to have Coronavirus/COVID-19? No    2. Do you have the following symptoms?  Fever/Chills? No   Cough? No   Shortness of breath? No   New loss of taste or smell? No  Sore throat? No  Muscle or body aches? No  Headaches? No  Fatigue? No  Vomiting or diarrhea? No    Step 2 Visitor Screening    1. Name of Visitor (1 visitor per patient): Merle    2. In the last month, have you been in contact with someone who was confirmed or suspected to have Coronavirus/COVID-19? No    3. Do you have the following symptoms?  Fever/Chills? No   Cough? No   Shortness of breath? No   Skin rash? No   Loss of taste or smell? No  Sore throat? No  Runny or stuffy nose? No  Muscle or body aches? No  Headaches? No  Fatigue? No  Vomiting or diarrhea? No

## 2021-07-30 NOTE — Clinical Note
The first balloon was inserted into the circumflex.Max pressure = 12 judy. Total duration = 36 seconds.     Max pressure = 20 judy. Total duration = 12 seconds.    Balloon reinflated a second time: Max pressure = 20 judy. Total duration = 12 seconds.

## 2021-07-30 NOTE — Clinical Note
Stent deployed in the circumflex. Max pressure = 12 judy. Total duration = 12 seconds. Balloon reinflated a second time: Max pressure = 12 judy. Total duration = 8 seconds.

## 2021-07-30 NOTE — Clinical Note
The first balloon was inserted into the circumflex.Max pressure = 20 judy. Total duration = 41 seconds.

## 2021-07-30 NOTE — TELEPHONE ENCOUNTER
I spoke with Chava shine. He is having the same symptoms that he was having when we spoke initially, pre-admission. Mostly exertional pain, but intermittent resting pain as well. The resting pain is temporarily relieved with SL NTG. For this reason, I have asked him to stop Losartan and start Imdur 30 mg daily. He will start the Imdur tonight. We will continue to up-titrate Imdur and amlodipine as needed for symptom control. He will also enroll in cardiac rehab to advance his activity safely. He is highly active at baseline.    We discussed the findings in the hospital and the plan going forward, which is for medical management, but potential re-attempt at revascularization if medical management fails. I will route this to Dr. Morales, who performed the inpatient angiogram.    Chava currently has an appointment hold with me tomorrow in clinic and will let me know tomorrow morning if he feels this is still needed.    Junie Matthew PA-C  [facility]  Pager: [phone]  Text Page  (7:30am - 4pm M-F)

## 2021-07-30 NOTE — PROGRESS NOTES
"  Cardiology Clinic Progress Note    Chris Barriga MRN# [id number]   YOB: 1960 Age: 60 year old   Primary cardiologist: Dr. Park         Assessment and Plan:     In summary, Chris Barriga presents today for a hospital follow up visit. He was admitted earlier this week with unstable angina. Angiogram showed a severe OM1 lesion in a tortuous vessel that was unable to be wired. Medical management was advised. Amlodipine was increased and Imdur was initiated.     Today, Chava notes that his pain has improved with the medication adjustments, but it continues to limit him. He has pain at rest in clinic currently. EKG is benign. Vitals are stable.    I reviewed his case with Dr. Morales, who advises he be directly readmitted for a repeat angiogram, possible PCI with him this afternoon. Chava is in agreement. Will arrange.     Risks and benefits of left heart catheterization and coronary angiogram were discussed with the patient in detail. 0.1-0.3% (for diagnostic angio) and 1-2% (for PCI)  risk of stroke, MI, death, emergent bypass for diagnostic angio, risk of contrast induced allergic reaction, renal dysfunction, vascular complications were discussed. Patient understands and wishes to proceed. The patient would be an appropriate candidate for DAPT, if required.         History of Presenting Illness:      Chris Barriga is a pleasant 60 year old patient who presents today for a hospital follow up visit.    He has a history of CAD, NSTEMI in 2012, s/p PCI to prox LAD. There was otherwise an 80-90% ostial small OM1 lesion which was managed medically, and very mild disease elsewhere. Since that time, he has been described as a \"model patient,\" compliant with routine exercise, a heart-healthy diet, and medications.     He saw Dr. Park last in January, and was feeling quite well. Fasting lipid profile is the best he has ever had, between his increase in exercise and weight loss.  Total " "cholesterol is now down to 147, HDL is up to 91, LDL is 35 and triglycerides are 107.     About two weeks ago, Chava started experiencing intermittent exertional angina. He is highly active at baseline, but was beginning to note chest discomfort which could come on during both vigorous and leisurely bike rides. A stress echo was ordered. This took place on 7/23 and showed distal anterolateral ischemia in a diagonal / OM distribution post stress with chest discomfort during the test (at peak exercise). He exercised 15 METs without ischemic changes on EKG. BP response to exercise was normal.     When I met with Chava to review his stress echo on July 27th, he relayed an episode of rest pain the night prior. I directed him to the ED for admission and angiogram. Troponins were negative. Angiogram took place the following day. This showed that his previously placed ostial to proximal LAD stent was widely patent.  He had a culprit 95% OM1 lesion which was eccentric and thrombotic.  Due to significant tortuosity Dr. Martin was unable to cross the lesion.  Therefore, medical management was recommended for the time being.  Amlodipine was increased, and Imdur was initiated.    Today, Chava returns to clinic with his wife. He tells me his discomfort has improved to a 1/10 in severity with the medication adjustments. However it is present in clinic currently. EKG shows normal ST segments. He denies shortness of breath, PND, orthopnea, edema. Some lightheadedness, which is likely related to the Imdur. BP is well-controlled. He hasn't eaten since dinner last night.         Review of Systems:     12-pt ROS is negative except for as noted in the HPI.          Physical Exam:     Vitals: /70 (BP Location: Left arm, Cuff Size: Adult Regular)   Pulse 72   Ht 1.803 m (5' 11\")   Wt 81.2 kg (179 lb)   SpO2 99%   BMI 24.97 kg/m    Wt Readings from Last 10 Encounters:   07/30/21 81.2 kg (179 lb)   07/28/21 79.5 kg (175 lb 4.8 oz) "   05/29/19 82.6 kg (182 lb 3.2 oz)   11/13/18 80.2 kg (176 lb 12.8 oz)   10/06/17 81.6 kg (179 lb 12.8 oz)   09/06/17 81 kg (178 lb 8 oz)   05/22/17 77.1 kg (170 lb)   02/17/17 79.4 kg (175 lb)   04/23/14 80.6 kg (177 lb 9.6 oz)   12/30/13 78.6 kg (173 lb 3.2 oz)       Constitutional:  Patient is pleasant, alert, cooperative, and in NAD.  HEENT:  NCAT. PERRLA. EOM's intact.   Neck:  CVP appears normal. No carotid bruits.   Pulmonary: Normal respiratory effort. CTAB.   Cardiac: RRR, normal S1/S2, no S3/S4, no murmur or rub.   Abdomen:  Non-tender abdomen, no hepatosplenomegaly appreciated.   Vascular: Pulses in the upper and lower extremities are 2+ and equal bilaterally.  Extremities: No edema, erythema, cyanosis or tenderness appreciated.  Skin:  No rashes or lesions appreciated.   Neurological:  No gross motor or sensory deficits.   Psych: Appropriate affect.        Data:     Labs reviewed:  Recent Labs   Lab Test 07/28/21  0431 01/21/21  0824 09/17/20  0000 05/29/19  0929   LDL 50 35 71 71   HDL 92 91 83 80   NHDL 63 56  --  89   CHOL 155 147 166 169   TRIG 67 107 61 88       Lab Results   Component Value Date    WBC 5.8 07/27/2021    WBC 5.5 08/21/2017    RBC 4.77 07/27/2021    RBC 4.81 08/21/2017    HGB 15.3 07/27/2021    HGB 15.4 08/21/2017    HCT 45.1 07/27/2021    HCT 44.3 08/21/2017    MCV 95 07/27/2021    MCV 92 08/21/2017    MCH 32.1 07/27/2021    MCH 32 08/21/2017    MCHC 33.9 07/27/2021    MCHC 34.8 08/21/2017    RDW 13.2 07/27/2021    RDW 13 08/21/2017     07/27/2021     08/21/2017       Lab Results   Component Value Date     07/27/2021     01/21/2021    POTASSIUM 3.9 07/27/2021    POTASSIUM 4.2 01/21/2021    CHLORIDE 107 07/27/2021    CHLORIDE 106 01/21/2021    CO2 30 07/27/2021    CO2 30 01/21/2021    ANIONGAP 3 07/27/2021    ANIONGAP 2 (L) 01/21/2021    GLC 96 07/27/2021    GLC 97 01/21/2021    BUN 21 07/27/2021    BUN 20 01/21/2021    CR 0.77 07/27/2021    CR 0.90  01/21/2021    GFRESTIMATED >90 07/27/2021    GFRESTIMATED >90 01/21/2021    GFRESTBLACK >90 01/21/2021    DANY 9.1 07/27/2021    DANY 8.9 01/21/2021      Lab Results   Component Value Date    AST 21 09/04/2018    ALT 24 09/17/2020       Lab Results   Component Value Date    A1C 5.0 07/28/2021       Lab Results   Component Value Date    INR 0.89 05/25/2012           Problem List:     Patient Active Problem List   Diagnosis     Pain in joint, lower leg     Other postprocedural status(V45.89)     Iliac artery aneurysm, left (H)     Coronary artery disease involving native coronary artery of native heart without angina pectoris     Benign essential hypertension     Pure hypercholesterolemia     Aortic root dilation (H)     Ischemic cardiomyopathy     Chest pain     Abnormal cardiovascular stress test     Hx of cardiac catheterization           Medications:     Current Outpatient Medications   Medication Sig Dispense Refill     ASPIRIN EC PO Take 81 mg by mouth daily       atorvastatin (LIPITOR) 10 MG tablet Take 1 tablet (10 mg) by mouth every other day 45 tablet 2     Flaxseed, Linseed, (FLAX SEED OIL) 1000 MG capsule Take 1 capsule by mouth daily       glucosamine-chondroitin 500-400 MG CAPS Take 2 capsules by mouth daily        isosorbide mononitrate (IMDUR) 30 MG 24 hr tablet Take 1 tablet (30 mg) by mouth daily 90 tablet 3     loratadine (CLARITIN) 10 MG capsule Take 10 mg by mouth daily       Lysine 500 MG TABS Take 1 tablet by mouth daily.       magnesium 500 MG TABS Take 500 mg by mouth every evening       MINOCYCLINE HCL PO Take 100 mg by mouth daily as needed (acne)        Multiple Vitamin (MULTI-VITAMIN) per tablet Take 1 tablet by mouth daily.       naproxen sodium (ANAPROX) 220 MG tablet Take by mouth 2 times daily as needed for moderate pain       amLODIPine (NORVASC) 5 MG tablet Take 1 tablet (5 mg) by mouth daily (Patient not taking: Reported on 7/30/2021) 30 tablet 0     hydrocortisone (WESTCORT) 0.2 %  cream Apply topically 2 times daily as needed  (Patient not taking: Reported on 7/30/2021)       nitroGLYcerin (NITROSTAT) 0.4 MG sublingual tablet Place 1 tablet (0.4 mg) under the tongue every 5 minutes as needed for chest pain Administer every 5 minutes as needed.  Maximum 3 doses in 15 minutes. (Patient not taking: Reported on 7/30/2021) 30 tablet 0           Past Medical History:     Past Medical History:   Diagnosis Date     Adenoma of ascending colon      Aortic root dilation (H)      Chest pain      Coronary artery disease     5/2012: Stent to proximal LAD for unstable angina. Proximal left circumflex 30-40%. EF 60%     HTN (hypertension)      Hyperlipidaemia      Iliac artery aneurysm, left (H)      Past Surgical History:   Procedure Laterality Date     APPENDECTOMY OPEN       ARTHROSCOPY KNEE BILATERAL      both knees     ARTHROSCOPY SHOULDER      left     C ORAL SURGERY PROCEDURE       CARDIAC SURGERY      stent x 1     COLONOSCOPY N/A 2/17/2017    Procedure: COLONOSCOPY;  Surgeon: Junie Grier MD;  Location:  OR     COLONOSCOPY  12/2016    [facility] in [place]     COLONOSCOPY  05/22/2017    Dr. Grier [facility]     COLONOSCOPY N/A 5/29/2018    Procedure: COLONOSCOPY;  colonoscopy (crsal);  Surgeon: Junie Grier MD;  Location: [facility]     LAPAROSCOPIC HERNIORRHAPHY INGUINAL  12/30/2013    Procedure: LAPAROSCOPIC HERNIORRHAPHY INGUINAL;  LAPAROSCOPIC RIGHT INGUINAL HERNIA REPAIR WITH MESH;  Surgeon: Reji Pizano MD;  Location: [facility]     ORTHOPEDIC SURGERY      scope of both shoulders     VASECTOMY       Family History   Problem Relation Age of Onset     Breast Cancer Mother      Cerebrovascular Disease Father      Breast Cancer Maternal Grandmother      Breast Cancer Son      Colon Cancer No family hx of      Social History     Socioeconomic History     Marital status:      Spouse name: Not on file     Number of children: Not on file     Years of education: Not on file      Highest education level: Not on file   Occupational History     Not on file   Tobacco Use     Smoking status: Never Smoker     Smokeless tobacco: Never Used   Substance and Sexual Activity     Alcohol use: Yes     Comment: 2-3 per week     Drug use: No     Sexual activity: Not on file   Other Topics Concern     Parent/sibling w/ CABG, MI or angioplasty before 65F 55M? Not Asked   Social History Narrative     Not on file     Social Determinants of Health     Financial Resource Strain:      Difficulty of Paying Living Expenses:    Food Insecurity:      Worried About Running Out of Food in the Last Year:      Ran Out of Food in the Last Year:    Transportation Needs:      Lack of Transportation (Medical):      Lack of Transportation (Non-Medical):    Physical Activity:      Days of Exercise per Week:      Minutes of Exercise per Session:    Stress:      Feeling of Stress :    Social Connections:      Frequency of Communication with Friends and Family:      Frequency of Social Gatherings with Friends and Family:      Attends Mu-ism Services:      Active Member of Clubs or Organizations:      Attends Club or Organization Meetings:      Marital Status:    Intimate Partner Violence:      Fear of Current or Ex-Partner:      Emotionally Abused:      Physically Abused:      Sexually Abused:            Allergies:   Seasonal allergies, Hydrocodone-acetaminophen, and Molds & smuts      Junie Matthew PA-C  [facility]  Pager: [phone]

## 2021-07-30 NOTE — Clinical Note
The first balloon was inserted into the circumflex.Max pressure = 12 judy. Total duration = 10 seconds.     Max pressure = 12 judy. Total duration = 11 seconds.    Balloon reinflated a second time: Max pressure = 12 judy. Total duration = 11 seconds.  Balloon reinflated a third time: Max pressure = 20 judy. Total duration = 10 seconds.

## 2021-07-30 NOTE — Clinical Note
Max pressure = 20 judy. Total duration = 12 seconds.     Max pressure = 20 judy. Total duration = 12 seconds.    Balloon reinflated a second time: Max pressure = 20 judy. Total duration = 12 seconds.  Balloon reinflated a third time: Max pressure = 20 judy. Total duration = 6 seconds.

## 2021-07-30 NOTE — PRE-PROCEDURE
GENERAL PRE-PROCEDURE:     Written consent obtained?: Yes    Risks and benefits: Risks, benefits and alternatives were discussed    DC Plan: Appropriate discharge home plan in place for patients who are going home after procedure   Consent given by:  Patient  Patient states understanding of procedure being performed: Yes    Patient's understanding of procedure matches consent: Yes    Procedure consent matches procedure scheduled: Yes    Expected level of sedation:  Moderate  Appropriately NPO:  Yes  Mallampati  :  Grade 2- soft palate, base of uvula, tonsillar pillars, and portion of posterior pharyngeal wall visible  Lungs:  Lungs clear with good breath sounds bilaterally  Heart:  Normal heart sounds and rate  History & Physical reviewed:  History and physical reviewed and no updates needed  Statement of review:  I have reviewed the lab findings, diagnostic data, medications, and the plan for sedation

## 2021-07-30 NOTE — Clinical Note
Stent deployed in the obtuse marginal. Max pressure = 16 judy. Total duration = 12 seconds. Balloon reinflated a second time: Max pressure = 20 judy. Total duration = 10 seconds.

## 2021-07-30 NOTE — LETTER
"7/30/2021    Bharathi Chowdary MD  [address] [facility] [address]  [facility] [address]    RE: Chris Barriga       Dear Colleague,    I had the pleasure of seeing Chris Barriga in the [facility].      Cardiology Clinic Progress Note    Chris Barriga MRN# [id number]   YOB: 1960 Age: 60 year old   Primary cardiologist: Dr. Park         Assessment and Plan:     In summary, Chris Barriga presents today for a hospital follow up visit. He was admitted earlier this week with unstable angina. Angiogram showed a severe OM1 lesion in a tortuous vessel that was unable to be wired. Medical management was advised. Amlodipine was increased and Imdur was initiated.     Today, Chava notes that his pain has improved with the medication adjustments, but it continues to limit him. He has pain at rest in clinic currently. EKG is benign. Vitals are stable.    I reviewed his case with Dr. Morales, who advises he be directly readmitted for a repeat angiogram, possible PCI with him this afternoon. Chava is in agreement. Will arrange.     Risks and benefits of left heart catheterization and coronary angiogram were discussed with the patient in detail. 0.1-0.3% (for diagnostic angio) and 1-2% (for PCI)  risk of stroke, MI, death, emergent bypass for diagnostic angio, risk of contrast induced allergic reaction, renal dysfunction, vascular complications were discussed. Patient understands and wishes to proceed. The patient would be an appropriate candidate for DAPT, if required.         History of Presenting Illness:      Chris Barriga is a pleasant 60 year old patient who presents today for a hospital follow up visit.    He has a history of CAD, NSTEMI in 2012, s/p PCI to prox LAD. There was otherwise an 80-90% ostial small OM1 lesion which was managed medically, and very mild disease elsewhere. Since that time, he has been described as a \"model " "patient,\" compliant with routine exercise, a heart-healthy diet, and medications.     He saw Dr. Park last in January, and was feeling quite well. Fasting lipid profile is the best he has ever had, between his increase in exercise and weight loss.  Total cholesterol is now down to 147, HDL is up to 91, LDL is 35 and triglycerides are 107.     About two weeks ago, Chava started experiencing intermittent exertional angina. He is highly active at baseline, but was beginning to note chest discomfort which could come on during both vigorous and leisurely bike rides. A stress echo was ordered. This took place on 7/23 and showed distal anterolateral ischemia in a diagonal / OM distribution post stress with chest discomfort during the test (at peak exercise). He exercised 15 METs without ischemic changes on EKG. BP response to exercise was normal.     When I met with Chava to review his stress echo on July 27th, he relayed an episode of rest pain the night prior. I directed him to the ED for admission and angiogram. Troponins were negative. Angiogram took place the following day. This showed that his previously placed ostial to proximal LAD stent was widely patent.  He had a culprit 95% OM1 lesion which was eccentric and thrombotic.  Due to significant tortuosity Dr. Martin was unable to cross the lesion.  Therefore, medical management was recommended for the time being.  Amlodipine was increased, and Imdur was initiated.    Today, Chava returns to clinic with his wife. He tells me his discomfort has improved to a 1/10 in severity with the medication adjustments. However it is present in clinic currently. EKG shows normal ST segments. He denies shortness of breath, PND, orthopnea, edema. Some lightheadedness, which is likely related to the Imdur. BP is well-controlled. He hasn't eaten since dinner last night.         Review of Systems:     12-pt ROS is negative except for as noted in the HPI.          Physical Exam: " "    Vitals: /70 (BP Location: Left arm, Cuff Size: Adult Regular)   Pulse 72   Ht 1.803 m (5' 11\")   Wt 81.2 kg (179 lb)   SpO2 99%   BMI 24.97 kg/m    Wt Readings from Last 10 Encounters:   07/30/21 81.2 kg (179 lb)   07/28/21 79.5 kg (175 lb 4.8 oz)   05/29/19 82.6 kg (182 lb 3.2 oz)   11/13/18 80.2 kg (176 lb 12.8 oz)   10/06/17 81.6 kg (179 lb 12.8 oz)   09/06/17 81 kg (178 lb 8 oz)   05/22/17 77.1 kg (170 lb)   02/17/17 79.4 kg (175 lb)   04/23/14 80.6 kg (177 lb 9.6 oz)   12/30/13 78.6 kg (173 lb 3.2 oz)       Constitutional:  Patient is pleasant, alert, cooperative, and in NAD.  HEENT:  NCAT. PERRLA. EOM's intact.   Neck:  CVP appears normal. No carotid bruits.   Pulmonary: Normal respiratory effort. CTAB.   Cardiac: RRR, normal S1/S2, no S3/S4, no murmur or rub.   Abdomen:  Non-tender abdomen, no hepatosplenomegaly appreciated.   Vascular: Pulses in the upper and lower extremities are 2+ and equal bilaterally.  Extremities: No edema, erythema, cyanosis or tenderness appreciated.  Skin:  No rashes or lesions appreciated.   Neurological:  No gross motor or sensory deficits.   Psych: Appropriate affect.        Data:     Labs reviewed:  Recent Labs   Lab Test 07/28/21  0431 01/21/21  0824 09/17/20  0000 05/29/19  0929   LDL 50 35 71 71   HDL 92 91 83 80   NHDL 63 56  --  89   CHOL 155 147 166 169   TRIG 67 107 61 88       Lab Results   Component Value Date    WBC 5.8 07/27/2021    WBC 5.5 08/21/2017    RBC 4.77 07/27/2021    RBC 4.81 08/21/2017    HGB 15.3 07/27/2021    HGB 15.4 08/21/2017    HCT 45.1 07/27/2021    HCT 44.3 08/21/2017    MCV 95 07/27/2021    MCV 92 08/21/2017    MCH 32.1 07/27/2021    MCH 32 08/21/2017    MCHC 33.9 07/27/2021    MCHC 34.8 08/21/2017    RDW 13.2 07/27/2021    RDW 13 08/21/2017     07/27/2021     08/21/2017       Lab Results   Component Value Date     07/27/2021     01/21/2021    POTASSIUM 3.9 07/27/2021    POTASSIUM 4.2 01/21/2021    CHLORIDE " 107 07/27/2021    CHLORIDE 106 01/21/2021    CO2 30 07/27/2021    CO2 30 01/21/2021    ANIONGAP 3 07/27/2021    ANIONGAP 2 (L) 01/21/2021    GLC 96 07/27/2021    GLC 97 01/21/2021    BUN 21 07/27/2021    BUN 20 01/21/2021    CR 0.77 07/27/2021    CR 0.90 01/21/2021    GFRESTIMATED >90 07/27/2021    GFRESTIMATED >90 01/21/2021    GFRESTBLACK >90 01/21/2021    DANY 9.1 07/27/2021    DANY 8.9 01/21/2021      Lab Results   Component Value Date    AST 21 09/04/2018    ALT 24 09/17/2020       Lab Results   Component Value Date    A1C 5.0 07/28/2021       Lab Results   Component Value Date    INR 0.89 05/25/2012           Problem List:     Patient Active Problem List   Diagnosis     Pain in joint, lower leg     Other postprocedural status(V45.89)     Iliac artery aneurysm, left (H)     Coronary artery disease involving native coronary artery of native heart without angina pectoris     Benign essential hypertension     Pure hypercholesterolemia     Aortic root dilation (H)     Ischemic cardiomyopathy     Chest pain     Abnormal cardiovascular stress test     Hx of cardiac catheterization           Medications:     Current Outpatient Medications   Medication Sig Dispense Refill     ASPIRIN EC PO Take 81 mg by mouth daily       atorvastatin (LIPITOR) 10 MG tablet Take 1 tablet (10 mg) by mouth every other day 45 tablet 2     Flaxseed, Linseed, (FLAX SEED OIL) 1000 MG capsule Take 1 capsule by mouth daily       glucosamine-chondroitin 500-400 MG CAPS Take 2 capsules by mouth daily        isosorbide mononitrate (IMDUR) 30 MG 24 hr tablet Take 1 tablet (30 mg) by mouth daily 90 tablet 3     loratadine (CLARITIN) 10 MG capsule Take 10 mg by mouth daily       Lysine 500 MG TABS Take 1 tablet by mouth daily.       magnesium 500 MG TABS Take 500 mg by mouth every evening       MINOCYCLINE HCL PO Take 100 mg by mouth daily as needed (acne)        Multiple Vitamin (MULTI-VITAMIN) per tablet Take 1 tablet by mouth daily.       naproxen  sodium (ANAPROX) 220 MG tablet Take by mouth 2 times daily as needed for moderate pain       amLODIPine (NORVASC) 5 MG tablet Take 1 tablet (5 mg) by mouth daily (Patient not taking: Reported on 7/30/2021) 30 tablet 0     hydrocortisone (WESTCORT) 0.2 % cream Apply topically 2 times daily as needed  (Patient not taking: Reported on 7/30/2021)       nitroGLYcerin (NITROSTAT) 0.4 MG sublingual tablet Place 1 tablet (0.4 mg) under the tongue every 5 minutes as needed for chest pain Administer every 5 minutes as needed.  Maximum 3 doses in 15 minutes. (Patient not taking: Reported on 7/30/2021) 30 tablet 0           Past Medical History:     Past Medical History:   Diagnosis Date     Adenoma of ascending colon      Aortic root dilation (H)      Chest pain      Coronary artery disease     5/2012: Stent to proximal LAD for unstable angina. Proximal left circumflex 30-40%. EF 60%     HTN (hypertension)      Hyperlipidaemia      Iliac artery aneurysm, left (H)      Past Surgical History:   Procedure Laterality Date     APPENDECTOMY OPEN       ARTHROSCOPY KNEE BILATERAL      both knees     ARTHROSCOPY SHOULDER      left     C ORAL SURGERY PROCEDURE       CARDIAC SURGERY      stent x 1     COLONOSCOPY N/A 2/17/2017    Procedure: COLONOSCOPY;  Surgeon: Junie Grier MD;  Location:  OR     COLONOSCOPY  12/2016    [place] [facility] [place]     COLONOSCOPY  05/22/2017    Dr. Grier [facility]     COLONOSCOPY N/A 5/29/2018    Procedure: COLONOSCOPY;  colonoscopy (crsal);  Surgeon: Junie Grier MD;  Location: [facility]     LAPAROSCOPIC HERNIORRHAPHY INGUINAL  12/30/2013    Procedure: LAPAROSCOPIC HERNIORRHAPHY INGUINAL;  LAPAROSCOPIC RIGHT INGUINAL HERNIA REPAIR WITH MESH;  Surgeon: Reji Pizano MD;  Location: [facility]     ORTHOPEDIC SURGERY      scope of both shoulders     VASECTOMY       Family History   Problem Relation Age of Onset     Breast Cancer Mother      Cerebrovascular Disease Father      Breast Cancer  Maternal Grandmother      Breast Cancer Son      Colon Cancer No family hx of      Social History     Socioeconomic History     Marital status:      Spouse name: Not on file     Number of children: Not on file     Years of education: Not on file     Highest education level: Not on file   Occupational History     Not on file   Tobacco Use     Smoking status: Never Smoker     Smokeless tobacco: Never Used   Substance and Sexual Activity     Alcohol use: Yes     Comment: 2-3 per week     Drug use: No     Sexual activity: Not on file   Other Topics Concern     Parent/sibling w/ CABG, MI or angioplasty before 65F 55M? Not Asked   Social History Narrative     Not on file     Social Determinants of Health     Financial Resource Strain:      Difficulty of Paying Living Expenses:    Food Insecurity:      Worried About Running Out of Food in the Last Year:      Ran Out of Food in the Last Year:    Transportation Needs:      Lack of Transportation (Medical):      Lack of Transportation (Non-Medical):    Physical Activity:      Days of Exercise per Week:      Minutes of Exercise per Session:    Stress:      Feeling of Stress :    Social Connections:      Frequency of Communication with Friends and Family:      Frequency of Social Gatherings with Friends and Family:      Attends [employer] Services:      Active Member of Clubs or Organizations:      Attends Club or Organization Meetings:      Marital Status:    Intimate Partner Violence:      Fear of Current or Ex-Partner:      Emotionally Abused:      Physically Abused:      Sexually Abused:            Allergies:   Seasonal allergies, Hydrocodone-acetaminophen, and Molds & smuts      [facility]IT[facility]  Pager: [phone]        Thank you for allowing me to participate in the care of your patient.      Sincerely,     HIPOLITO [facility]  cc:   Jan Park,  MD  [address]  [place] [id number]

## 2021-07-30 NOTE — PROGRESS NOTES
Care Suites  Nursing Note    Patient Information  Name: Chris Barriga  Age: 60 year old  Reason for admission:   Admitted from Heart clinic with unrelieved chest pain since 7/27 Angiogram    Pt. Placed on oxygen without any relief   Plan: Heart Cath with Dr. Morales   Admit to 244-1 after Heart Cath   Report called to Clayton PADRON

## 2021-07-30 NOTE — Clinical Note
Max pressure = 14 judy. Total duration = 16 seconds.     Max pressure = 14 judy. Total duration = 12 seconds.    Balloon reinflated a second time: Max pressure = 14 judy. Total duration = 12 seconds.  Balloon reinflated a third time: Max pressure = 20 judy. Total duration = 13 seconds.

## 2021-07-30 NOTE — Clinical Note
The first balloon was inserted into the circumflex.Max pressure = 10 judy. Total duration = 19 seconds.

## 2021-07-30 NOTE — Clinical Note
The first balloon was inserted into the circumflex.Max pressure = 12 judy. Total duration = 7 seconds.     Max pressure = 12 judy. Total duration = 9 seconds.    Balloon reinflated a second time: Max pressure = 12 judy. Total duration = 9 seconds.

## 2021-07-30 NOTE — Clinical Note
The first balloon was inserted into the circumflex.Max pressure = 18 judy. Total duration = 14 seconds.     Max pressure = 20 judy. Total duration = 16 seconds.    Balloon reinflated a second time: Max pressure = 20 judy. Total duration = 16 seconds.

## 2021-07-31 ENCOUNTER — APPOINTMENT (OUTPATIENT)
Dept: PHYSICAL THERAPY | Facility: CLINIC | Age: 61
DRG: 246 | End: 2021-07-31
Attending: INTERNAL MEDICINE
Payer: COMMERCIAL

## 2021-07-31 VITALS
SYSTOLIC BLOOD PRESSURE: 108 MMHG | HEART RATE: 79 BPM | TEMPERATURE: 98.2 F | RESPIRATION RATE: 16 BRPM | OXYGEN SATURATION: 98 % | BODY MASS INDEX: 25.24 KG/M2 | WEIGHT: 181 LBS | DIASTOLIC BLOOD PRESSURE: 72 MMHG

## 2021-07-31 LAB
ACT BLD: 183 SECONDS (ref 74–150)
ACT BLD: 251 SECONDS (ref 74–150)
ACT BLD: 276 SECONDS (ref 74–150)
ACT BLD: 296 SECONDS (ref 74–150)
ACT BLD: 304 SECONDS (ref 74–150)
ACT BLD: 304 SECONDS (ref 74–150)
ACT BLD: 473 SECONDS (ref 74–150)
ANION GAP SERPL CALCULATED.3IONS-SCNC: <1 MMOL/L (ref 3–14)
BUN SERPL-MCNC: 23 MG/DL (ref 7–30)
CALCIUM SERPL-MCNC: 8 MG/DL (ref 8.5–10.1)
CHLORIDE BLD-SCNC: 110 MMOL/L (ref 94–109)
CHOLEST SERPL-MCNC: 105 MG/DL
CO2 SERPL-SCNC: 28 MMOL/L (ref 20–32)
CREAT SERPL-MCNC: 0.88 MG/DL (ref 0.66–1.25)
FASTING STATUS PATIENT QL REPORTED: NO
GFR SERPL CREATININE-BSD FRML MDRD: >90 ML/MIN/1.73M2
GLUCOSE BLD-MCNC: 95 MG/DL (ref 70–99)
HDLC SERPL-MCNC: 66 MG/DL
LDLC SERPL CALC-MCNC: 26 MG/DL
NONHDLC SERPL-MCNC: 39 MG/DL
POTASSIUM BLD-SCNC: 4 MMOL/L (ref 3.4–5.3)
SARS-COV-2 RNA RESP QL NAA+PROBE: NEGATIVE
SODIUM SERPL-SCNC: 138 MMOL/L (ref 133–144)
TRIGL SERPL-MCNC: 64 MG/DL

## 2021-07-31 PROCEDURE — 97530 THERAPEUTIC ACTIVITIES: CPT | Mod: GP

## 2021-07-31 PROCEDURE — 99232 SBSQ HOSP IP/OBS MODERATE 35: CPT | Performed by: INTERNAL MEDICINE

## 2021-07-31 PROCEDURE — 80061 LIPID PANEL: CPT | Performed by: INTERNAL MEDICINE

## 2021-07-31 PROCEDURE — 99239 HOSP IP/OBS DSCHRG MGMT >30: CPT | Performed by: HOSPITALIST

## 2021-07-31 PROCEDURE — 36415 COLL VENOUS BLD VENIPUNCTURE: CPT | Performed by: INTERNAL MEDICINE

## 2021-07-31 PROCEDURE — 97110 THERAPEUTIC EXERCISES: CPT | Mod: GP

## 2021-07-31 PROCEDURE — 93005 ELECTROCARDIOGRAM TRACING: CPT

## 2021-07-31 PROCEDURE — 80048 BASIC METABOLIC PNL TOTAL CA: CPT | Performed by: INTERNAL MEDICINE

## 2021-07-31 PROCEDURE — 97161 PT EVAL LOW COMPLEX 20 MIN: CPT | Mod: GP

## 2021-07-31 PROCEDURE — 250N000013 HC RX MED GY IP 250 OP 250 PS 637: Performed by: INTERNAL MEDICINE

## 2021-07-31 RX ORDER — PRASUGREL 10 MG/1
10 TABLET, FILM COATED ORAL DAILY
Qty: 30 TABLET | Refills: 0 | Status: SHIPPED | OUTPATIENT
Start: 2021-08-01 | End: 2021-08-19

## 2021-07-31 RX ORDER — ROSUVASTATIN CALCIUM 40 MG/1
40 TABLET, COATED ORAL DAILY
Qty: 30 TABLET | Refills: 0 | Status: SHIPPED | OUTPATIENT
Start: 2021-08-01 | End: 2021-08-11

## 2021-07-31 RX ORDER — ACETAMINOPHEN 325 MG/1
650 TABLET ORAL EVERY 4 HOURS PRN
COMMUNITY
Start: 2021-07-31

## 2021-07-31 RX ADMIN — ASPIRIN 81 MG: 81 TABLET, COATED ORAL at 09:36

## 2021-07-31 RX ADMIN — PRASUGREL 10 MG: 10 TABLET, FILM COATED ORAL at 09:36

## 2021-07-31 RX ADMIN — ROSUVASTATIN CALCIUM 40 MG: 20 TABLET, FILM COATED ORAL at 09:36

## 2021-07-31 ASSESSMENT — ACTIVITIES OF DAILY LIVING (ADL)
ADLS_ACUITY_SCORE: 11

## 2021-07-31 NOTE — CONSULTS
[facility]    Cardiology Consultation     Date of Admission:  7/30/2021    Assessment & Plan     1.  Readmission for Non-ST elevation ACS  2.  Complex multivessel CAD, with culprit lesion a subtotal occlusion of the proximal OM1, s/p PCI with REI x4  3.  Moderate in-stent restenosis of the prior proximal LAD stent  4.  Dyslipidemia  5.  Hypertension      -We will admit patient overnight for observation following complex PCI  -Bedrest for 3 hours following 8 [place] RFA access closed with Angio-Seal  -DAPT with aspirin and prasugrel  -High intensity statin  -Continue current home blood pressure regimen  -Cardiac rehab referral      -Return to the Cath Lab within 4-6 weeks for FFR of the proximal LAD and possible shockwave of the proximal LCx stent for improved expansion        Mitchell Morales MD  Interventional Cardiology  July 30, 2021      Code Status    Prior    Reason for Consult   Unstable angina, complex PCI    Primary Care Physician   Bharathi Chowdary    Chief Complaint   Chest pain    History is obtained from the patient    History of Present Illness   Chris Barriga is an extremely pleasant 60-year-old man with a history of prior proximal LAD stenting, who was recently admitted with NSTEMI and found to have an acute culprit OM1 lesion.  PCI was attempted on 7/28/2021 but was unsuccessful due to severe tortuosity and inability to wire the lesion.  As the lesion was located in a side branch vessel, an attempt was made to manage the patient with medical therapy.  Unfortunately, he had breakthrough pain including mild rest pain.  The patient saw Junie Matthew PA-C, in clinic earlier today.  Due to his continued ongoing pain, we elected to admit him for repeat attempt at complex PCI today.  PCI was successful today, though was very complex due to the severe tortuosity.  Ultimately, a total of 4 drug-eluting stents were placed across the LCx/OM1 bifurcation, with stenting back to the  ostium of the LCx.  He tolerated the procedure well.  Of note, on further review of the angiogram, it appears that his proximal LAD in-stent restenosis may be a bit more hemodynamically significant than was previously appreciated on his 7/28/2021 angiogram.  Also, the proximalmost portion of his newly placed LCx stents are a bit underexpanded due to more severe calcification than initially appreciated.  However, at the end of his current procedure, flow was MANI-3 in all vessels and there was an excellent lumen size on both IVUS and angiography.    Past Medical History   I have reviewed this patient's medical history and updated it with pertinent information if needed.   Past Medical History:   Diagnosis Date     Adenoma of ascending colon      Aortic root dilation (H)      Chest pain      Coronary artery disease     5/2012: Stent to proximal LAD for unstable angina. Proximal left circumflex 30-40%. EF 60%     HTN (hypertension)      Hyperlipidaemia      Iliac artery aneurysm, left (H)        Past Surgical History   I have reviewed this patient's surgical history and updated it with pertinent information if needed.  Past Surgical History:   Procedure Laterality Date     APPENDECTOMY OPEN       ARTHROSCOPY KNEE BILATERAL      both knees     ARTHROSCOPY SHOULDER      left     C ORAL SURGERY PROCEDURE       CARDIAC SURGERY      stent x 1     COLONOSCOPY N/A 2/17/2017    Procedure: COLONOSCOPY;  Surgeon: Junie Grier MD;  Location:  OR     COLONOSCOPY  12/2016    [place] [facility] [place]     COLONOSCOPY  05/22/2017    Dr. Grier [facility]     COLONOSCOPY N/A 5/29/2018    Procedure: COLONOSCOPY;  colonoscopy (crsal);  Surgeon: Junie Grier MD;  Location:  GI     CV HEART CATHETERIZATION WITH POSSIBLE INTERVENTION N/A 7/28/2021    Procedure: Heart Catheterization with Possible Intervention;  Surgeon: Buddy Martin MD;  Location:  HEART CARDIAC CATH LAB     LAPAROSCOPIC HERNIORRHAPHY INGUINAL   12/30/2013    Procedure: LAPAROSCOPIC HERNIORRHAPHY INGUINAL;  LAPAROSCOPIC RIGHT INGUINAL HERNIA REPAIR WITH MESH;  Surgeon: Reji Pizano MD;  Location: [facility]     ORTHOPEDIC SURGERY      scope of both shoulders     VASECTOMY         Prior to Admission Medications   Prior to Admission Medications   Prescriptions Last Dose Informant Patient Reported? Taking?   ASPIRIN EC PO 7/29/2021 at Unknown time Self Yes Yes   Sig: Take 81 mg by mouth daily   Flaxseed, Linseed, (FLAX SEED OIL) 1000 MG capsule 7/29/2021 at Unknown time Self Yes Yes   Sig: Take 1 capsule by mouth daily   Lysine 500 MG TABS 7/29/2021 at Unknown time Self Yes Yes   Sig: Take 1 tablet by mouth daily.   MINOCYCLINE HCL PO 7/29/2021 at Unknown time Self Yes Yes   Sig: Take 100 mg by mouth daily as needed (acne)    Multiple Vitamin (MULTI-VITAMIN) per tablet 7/29/2021 at Unknown time Self Yes Yes   Sig: Take 1 tablet by mouth daily.   amLODIPine (NORVASC) 5 MG tablet 7/29/2021 at Unknown time  No Yes   Sig: Take 1 tablet (5 mg) by mouth daily   atorvastatin (LIPITOR) 10 MG tablet 7/29/2021 at Unknown time  No Yes   Sig: Take 1 tablet (10 mg) by mouth every other day   glucosamine-chondroitin 500-400 MG CAPS 7/29/2021 at Unknown time Self Yes Yes   Sig: Take 2 capsules by mouth daily    hydrocortisone (WESTCORT) 0.2 % cream Past Week at Unknown time Self Yes Yes   Sig: Apply topically 2 times daily as needed    isosorbide mononitrate (IMDUR) 30 MG 24 hr tablet 7/30/2021 at Unknown time  No Yes   Sig: Take 1 tablet (30 mg) by mouth daily   loratadine (CLARITIN) 10 MG capsule 7/30/2021 at Unknown time  Yes Yes   Sig: Take 10 mg by mouth daily   magnesium 500 MG TABS 7/29/2021 at Unknown time Self Yes Yes   Sig: Take 500 mg by mouth every evening   naproxen sodium (ANAPROX) 220 MG tablet Past Week at Unknown time  Yes Yes   Sig: Take by mouth 2 times daily as needed for moderate pain   nitroGLYcerin (NITROSTAT) 0.4 MG sublingual tablet 7/29/2021 at  1400  No No   Sig: Place 1 tablet (0.4 mg) under the tongue every 5 minutes as needed for chest pain Administer every 5 minutes as needed.  Maximum 3 doses in 15 minutes.   Patient not taking: Reported on 7/30/2021      Facility-Administered Medications: None     Allergies   Allergies   Allergen Reactions     Seasonal Allergies      Hydrocodone-Acetaminophen Other (See Comments)     FEELS WIRED  FEELS WIRED  Hyperactive, insomnia.        Molds & Smuts        Social History   I have reviewed this patient's social history and updated it with pertinent information if needed. Chris Barriga  reports that he has never smoked. He has never used smokeless tobacco. He reports current alcohol use. He reports that he does not use drugs.    Family History   I have reviewed this patient's family history and updated it with pertinent information if needed.   Family History   Problem Relation Age of Onset     Breast Cancer Mother      Cerebrovascular Disease Father      Breast Cancer Maternal Grandmother      Breast Cancer Son      Colon Cancer No family hx of        Review of Systems   A 10 point Review of Systems was completed and was negative other than noted in the HPI     Physical Exam   Temp: 98.5  F (36.9  C) Temp src: Oral BP: 104/67 Pulse: 72   Resp: 16 SpO2: 99 % O2 Device: Nasal cannula Oxygen Delivery: 2 LPM  Vital Signs with Ranges  Temp:  [98.5  F (36.9  C)] 98.5  F (36.9  C)  Pulse:  [72] 72  Resp:  [16] 16  BP: (104-126)/(67-70) 104/67  SpO2:  [99 %] 99 %  179 lbs 12.8 oz    Constitutional:  Awake, alert, no acute distress  Eyes: EOMI, sclera non-icteric  ENT: Trachea midline  Respiratory: Normal respiratory effort, CTAB  Cardiovascular: RRR, no m/r/g.  JVP < 7 cm H2O.  There is no LE edema.  Normal carotid upstrokes, no carotid bruits.  GI:  Nondistended, nontender.  Skin: Dry, no significant rash on exposed skin.  No hematoma or ecchymosis at RFA access site  Musculoskeletal:  Strength /5 in upper and  lower extremities  Psychiatric: Appropriate affect      Data   Labs  I have personally reviewed the pertinent cardiac labs.    Most Recent 3 CBC's:  Recent Labs   Lab Test 07/30/21  1225 07/27/21  1503 08/21/17  0000   WBC 5.9 5.8 5.5   HGB 14.4 15.3 15.4   MCV 96 95 92    187 142       Most Recent 3 BMP's:  Recent Labs   Lab Test 07/30/21  1225 07/27/21  1503 01/21/21  0824    140 138   POTASSIUM 4.8 3.9 4.2   CHLORIDE 109 107 106   CO2 28 30 30   BUN 19 21 20   CR 0.89 0.77 0.90   ANIONGAP 1* 3 2*   DANY 8.8 9.1 8.9   * 96 97       Most Recent 2 LFT's:  Recent Labs   Lab Test 09/17/20  0000 09/04/18  0000 08/21/17  0000   AST  --  21 23   ALT 24 27 25   ALKPHOS  --  67 84   BILITOTAL  --  1 0.8       Most Recent 3 INR's:  Recent Labs   Lab Test 07/30/21  1224   INR 0.97       Most Recent 3 Troponin's:  Recent Labs   Lab Test 07/28/21  0431 07/28/21  0051 07/27/21  2253   TROPONIN <0.015 <0.015 <0.015       Most Recent Cholesterol Panel:  Recent Labs   Lab Test 07/28/21  0431   CHOL 155   LDL 50   HDL 92   TRIG 67       Most Recent Hemoglobin A1c:  Recent Labs   Lab Test 07/28/21  0431   A1C 5.0         Mitchell Morales MD  Interventional Cardiology  July 30, 2021

## 2021-07-31 NOTE — PROGRESS NOTES
[facility]    Cardiology Progress Note     Assessment & Plan     1.  Readmission for Non-ST elevation ACS  2.  Complex multivessel CAD, with culprit lesion a subtotal occlusion of the proximal OM1, s/p PCI with REI x4  3.  Moderate in-stent restenosis of the prior proximal LAD stent  4.  Dyslipidemia  5.  Hypertension        -Access site rebleed that was managed with manual pressure with good results.  Has participated in cardiac rehab with no groin site issues   -DAPT with aspirin and prasugrel  -High intensity statin, patient willing to try this (recommended by Dr. Morales).  He had prior sleep disturbances but now has worsening CAD and wants to try higher dose  -Continue current home blood pressure regimen (antianginal therapies could be de-escalated with primary cardiologist if he improves with this recent PCI)  -Cardiac rehab referral  -Return to the Cath Lab within 4-6 weeks for FFR of the proximal LAD and possible shockwave of the proximal LCx stent for improved expansion  --f/u with cardiology within 1 week  --Ok to discharge this am    Chris Shook MD    Interval History   Access site rebleed that was managed with manual pressure with good results.  Has participated in cardiac rehab with no groin site issues     Physical Exam   Temp: 98.2  F (36.8  C) Temp src: Oral BP: 108/72 (Pre CR) Pulse: 79   Resp: 16 SpO2: 98 % O2 Device: None (Room air) Oxygen Delivery: 2 LPM  Vitals:    07/30/21 1202 07/31/21 0512   Weight: 81.6 kg (179 lb 12.8 oz) 82.1 kg (181 lb)     Vital Signs with Ranges  Temp:  [97.7  F (36.5  C)-98.5  F (36.9  C)] 98.2  F (36.8  C)  Pulse:  [53-79] 79  Resp:  [16-18] 16  BP: ()/(52-72) 108/72  SpO2:  [96 %-100 %] 98 %  I/O last 3 completed shifts:  In: 600 [P.O.:600]  Out: -     GENERAL APPEARANCE: Alert and oriented x3. No acute distress.  SKIN: Inspection of the skin reveals no rashes, ulcerations, warm, dry  NECK: Supple and symmetric.  normal  JVP  LUNGS: Clear breath sounds throughout bilaterally, no wheezes, no rhonchi  CARDIOVASCULAR: S1, S2, regular rate and rhythm without any murmurs, gallops, rubs. The carotid pulses were normal and 2+ bilaterally without bruits. Peripheral pulses were 2+ and symmetric.  ABDOMEN: Soft, non-tender, non-distended with normal bowel sounds.  No ascites noted.  EXTREMITIES: no edema. Groin site is c/d/i  NEUROLOGIC:  Normal mood and affect.  Sensation to touch was normal.    Medications     Percutaneous Coronary Intervention orders placed (this is information for BPA alerting)         aspirin  81 mg Oral Daily     prasugrel  10 mg Oral Daily     rosuvastatin  40 mg Oral Daily       Data   Results for orders placed or performed during the hospital encounter of 07/30/21 (from the past 24 hour(s))   EKG 12-lead, tracing only   Result Value Ref Range    Systolic Blood Pressure  mmHg    Diastolic Blood Pressure  mmHg    Ventricular Rate 59 BPM    Atrial Rate 59 BPM    TX Interval 170 ms    QRS Duration 92 ms     ms    QTc 407 ms    P Axis 61 degrees    R AXIS 72 degrees    T Axis 63 degrees    Interpretation ECG       Sinus bradycardia  Otherwise normal ECG  When compared with ECG of 28-JUL-2021 16:17,  No significant change was found     INR   Result Value Ref Range    INR 0.97 0.85 - 1.15   Basic metabolic panel   Result Value Ref Range    Sodium 138 133 - 144 mmol/L    Potassium 4.8 3.4 - 5.3 mmol/L    Chloride 109 94 - 109 mmol/L    Carbon Dioxide (CO2) 28 20 - 32 mmol/L    Anion Gap 1 (L) 3 - 14 mmol/L    Urea Nitrogen 19 7 - 30 mg/dL    Creatinine 0.89 0.66 - 1.25 mg/dL    Calcium 8.8 8.5 - 10.1 mg/dL    Glucose 109 (H) 70 - 99 mg/dL    GFR Estimate >90 >60 mL/min/1.73m2   CBC with platelets   Result Value Ref Range    WBC Count 5.9 4.0 - 11.0 10e3/uL    RBC Count 4.37 (L) 4.40 - 5.90 10e6/uL    Hemoglobin 14.4 13.3 - 17.7 g/dL    Hematocrit 41.8 40.0 - 53.0 %    MCV 96 78 - 100 fL    MCH 33.0 26.5 - 33.0 pg     MCHC 34.4 31.5 - 36.5 g/dL    RDW 13.2 10.0 - 15.0 %    Platelet Count 167 150 - 450 10e3/uL   Cardiac Catheterization    Narrative    1.  Successful complex, HD IVUS guided PCI of the highly tortuous LCx/OM1   bifurcation with stenting back to the LCx ostium.  Bifurcation stenting   was performed in a Culotte technique.  There is mild-moderate   underexpansion of the ostial portion of the LCx stent due to significant   underlying calcium burden.  2.  Moderate in-stent restenosis of the prior proximal LAD stent.  3.  Successful closure of the 8 Jordanian RFA access site with a single 8   Jordanian Angio-Seal closure device.   EKG 12-lead, tracing only   Result Value Ref Range    Systolic Blood Pressure  mmHg    Diastolic Blood Pressure  mmHg    Ventricular Rate 53 BPM    Atrial Rate 53 BPM    TN Interval 168 ms    QRS Duration 110 ms     ms    QTc 414 ms    P Axis 72 degrees    R AXIS 69 degrees    T Axis 69 degrees    Interpretation ECG       Sinus bradycardia  Otherwise normal ECG  When compared with ECG of 30-JUL-2021 12:11, (unconfirmed)  No significant change was found     Asymptomatic COVID-19 Virus (Coronavirus) by PCR Nasopharyngeal    Specimen: Nasopharyngeal; Swab    Narrative    The following orders were created for panel order Asymptomatic COVID-19 Virus (Coronavirus) by PCR Nasopharyngeal.  Procedure                               Abnormality         Status                     ---------                               -----------         ------                     SARS-COV2 (COVID-19) Vir...[[id number]]  Normal              Final result                 Please view results for these tests on the individual orders.   SARS-COV2 (COVID-19) Virus RT-PCR    Specimen: Nasopharyngeal; Swab   Result Value Ref Range    SARS CoV2 PCR Negative Negative    Narrative    Testing was performed using the Xpert Xpress SARS-CoV-2 Assay on the  Cepheid Gene-Xpert Instrument Systems. Additional information about  this Emergency  Use Authorization (EUA) assay can be found via the Lab  Guide. This test should be ordered for the detection of SARS-CoV-2 in  individuals who meet SARS-CoV-2 clinical and/or epidemiological  criteria. Test performance is unknown in asymptomatic patients. This  test is for in vitro diagnostic use under the FDA EUA for  laboratories certified under CLIA to perform high complexity testing.  This test has not been FDA cleared or approved. A negative result  does not rule out the presence of PCR inhibitors in the specimen or  target RNA in concentration below the limit of detection for the  assay. The possibility of a false negative should be considered if  the patient's recent exposure or clinical presentation suggests  COVID-19. This test was validated by the [facility]. This laboratory is certified under  the Clinical Laboratory Improvement Amendments of 1988 (CLIA-88) as  qualified to perform high complexity laboratory testing.     Lipid Profile   Result Value Ref Range    Cholesterol 105 <200 mg/dL    Triglycerides 64 <150 mg/dL    Direct Measure HDL 66 >=40 mg/dL    LDL Cholesterol Calculated 26 <=100 mg/dL    Non HDL Cholesterol 39 <130 mg/dL    Patient Fasting > 8hrs? No    Basic metabolic panel   Result Value Ref Range    Sodium 138 133 - 144 mmol/L    Potassium 4.0 3.4 - 5.3 mmol/L    Chloride 110 (H) 94 - 109 mmol/L    Carbon Dioxide (CO2) 28 20 - 32 mmol/L    Anion Gap <1 (L) 3 - 14 mmol/L    Urea Nitrogen 23 7 - 30 mg/dL    Creatinine 0.88 0.66 - 1.25 mg/dL    Calcium 8.0 (L) 8.5 - 10.1 mg/dL    Glucose 95 70 - 99 mg/dL    GFR Estimate >90 >60 mL/min/1.73m2   EKG 12-lead, tracing only   Result Value Ref Range    Systolic Blood Pressure  mmHg    Diastolic Blood Pressure  mmHg    Ventricular Rate 75 BPM    Atrial Rate 75 BPM    [place] Interval 180 ms    QRS Duration 106 ms     ms    QTc 433 ms    P Axis 73 degrees    R AXIS 58 degrees    T Axis 57 degrees     Interpretation ECG       Sinus rhythm with sinus arrhythmia  Normal ECG  When compared with ECG of 30-JUL-2021 19:19, (unconfirmed)  No significant change was found

## 2021-07-31 NOTE — PROGRESS NOTES
07/31/21 0800   Quick Adds   Type of Visit Initial PT Evaluation   Living Environment   People in home spouse   Current Living Arrangements house   Home Accessibility stairs within home   Number of Stairs, Within Home, Primary greater than 10 stairs   Stair Railings, Within Home, Primary railings safe and in good condition;railing on right side (ascending)   Transportation Anticipated family or friend will provide;car, drives self   Self-Care   Usual Activity Tolerance excellent   Current Activity Tolerance good   Regular Exercise Yes   Activity/Exercise Type biking   Exercise Amount/Frequency daily   Equipment Currently Used at Home none   Disability/Function   Fall history within last six months no   General Information   Onset of Illness/Injury or Date of Surgery 07/30/21   Referring Physician Dr. Morales   Patient/Family Therapy Goals Statement (PT) To go home   Pertinent History of Current Problem (include personal factors and/or comorbidities that impact the POC) Pt is a 60 year old male s/p PCI x 4.   Cognition   Orientation Status (Cognition) oriented x 4   Affect/Mental Status (Cognition) WFL   Pain Assessment   Patient Currently in Pain No   Range of Motion (ROM)   ROM Quick Adds ROM WFL   Strength   Manual Muscle Testing Quick Adds Strength WFL   Bed Mobility   Bed Mobility No deficits identified   Transfers   Transfers No deficits identified   Gait/Stairs (Locomotion)   Comment (Gait/Stairs) Independent   Balance   Balance Comments Good    Clinical Impression   Criteria for Skilled Therapeutic Intervention yes, treatment indicated   PT Diagnosis (PT) Impaired endurance   Influenced by the following impairments Decreased endurance   Functional limitations due to impairments Difficulty with long distance ambulation   Clinical Presentation Stable/Uncomplicated   Clinical Presentation Rationale VSS, pain controlled   Clinical Decision Making (Complexity) low complexity   Therapy Frequency (PT) 2x/day    Predicted Duration of Therapy Intervention (days/wks) 3 days   Planned Therapy Interventions (PT) patient/family education;progressive activity/exercise;risk factor education;home program guidelines   Risk & Benefits of therapy have been explained evaluation/treatment results reviewed;care plan/treatment goals reviewed;risks/benefits reviewed;current/potential barriers reviewed;participants voiced agreement with care plan;participants included;patient   PT Discharge Planning    PT Discharge Recommendation (DC Rec) home with outpatient cardiac rehab   PT Rationale for DC Rec Pt is independent with mobility and safe to discharge home. Pt will benefit from outpatient CR to further increase activity tolerance and for cardiac education.    PT Brief overview of current status  Independent   Total Evaluation Time   Total Evaluation Time (Minutes) 10

## 2021-07-31 NOTE — H&P
[facility]    History and Physical  Hospitalist       Date of Admission:  7/30/2021    Assessment & Plan   Chris Barriga is a 60 year old male with a history of coronary artery disease, hypertension, and hyperlipidemia who is being admitted to the hospital following complex PCI.    Coronary artery disease  Hypertension  Hyperlipidemia  Status post successful complex PCI of a highly tortuous left circumflex/OM1 bifurcation lesion with stenting back to the left circumflex ostium.    Admit to inpatient.    Continuous cardiac monitoring.    Aspirin, prasugrel, and rosuvastatin per cardiology orders.    Previously on atorvastatin, switch to high-dose rosuvastatin during this admission.    Hold PTA amlodipine and Imdur for now given relatively low blood pressures.    Cardiology consulted, appreciate their assistance.    Cardiac rehab.    Will need return to Cath Lab in 4 to 6 weeks per cardiology notes for FFR of the proximal LAD and possible shockwave of the proximal left circumflex stent for improved expansion.    DVT Prophylaxis: Low Risk/Ambulatory with no VTE prophylaxis indicated  Code Status: Full Code  Expected discharge: admitted to inpatient.    Fabian Chun MD    Primary Care Physician   Bharathi Chowdary    Chief Complaint   NSTEMI    History is obtained from the patient    History of Present Illness   Chris Barriga is a 60 year old male with a history of coronary artery disease, hypertension, and hyperlipidemia who is being admitted to the hospital following complex PCI.  He was admitted to the hospital earlier this week with unstable angina.  Angiogram showed a severe OM1 lesion and a tortuous vessel that was unable to be wired.  Medical management was recommended; amlodipine was increased and Imdur was started.  He was discharged home.  He has continued to have ongoing chest pain which limited his activity.  He was seen in the cardiology clinic today and  arrangements were made for him to come to the hospital for repeat angiogram and possible PCI.    In the Cath Lab, he underwent repeat angiogram and successful PCI by Dr. Morales.  It was a long procedure and 4 stents were placed with good result.  The hospitalist service was contacted to admit him to the hospital following the procedure.    Chris Barriga was seen in his room following the procedure.  He feels pretty good.  He states his pain is much improved, currently at 0.5 out of 10 on the pain scale.  Denies shortness of breath.  Some nausea during the procedure but none now.  No abdominal pain.  No numbness, tingling, or focal weakness.  No recent urinary symptoms.    Past Medical History    I have reviewed this patient's medical history and updated it with pertinent information if needed.   Past Medical History:   Diagnosis Date     Adenoma of ascending colon      Aortic root dilation (H)      Chest pain      Coronary artery disease     5/2012: Stent to proximal LAD for unstable angina. Proximal left circumflex 30-40%. EF 60%     HTN (hypertension)      Hyperlipidaemia      Iliac artery aneurysm, left (H)      Past Surgical History   I have reviewed this patient's surgical history and updated it with pertinent information if needed.  Past Surgical History:   Procedure Laterality Date     APPENDECTOMY OPEN       ARTHROSCOPY KNEE BILATERAL      both knees     ARTHROSCOPY SHOULDER      left     C ORAL SURGERY PROCEDURE       CARDIAC SURGERY      stent x 1     COLONOSCOPY N/A 2/17/2017    Procedure: COLONOSCOPY;  Surgeon: Junie Grier MD;  Location:  OR     COLONOSCOPY  12/2016    [facility] in [place]     COLONOSCOPY  05/22/2017    Dr. Grier [facility]     COLONOSCOPY N/A 5/29/2018    Procedure: COLONOSCOPY;  colonoscopy (crsal);  Surgeon: Junie Grier MD;  Location:  GI     CV HEART CATHETERIZATION WITH POSSIBLE INTERVENTION N/A 7/28/2021    Procedure: Heart Catheterization with  Possible Intervention;  Surgeon: Buddy Martin MD;  Location:  HEART CARDIAC CATH LAB     LAPAROSCOPIC HERNIORRHAPHY INGUINAL  12/30/2013    Procedure: LAPAROSCOPIC HERNIORRHAPHY INGUINAL;  LAPAROSCOPIC RIGHT INGUINAL HERNIA REPAIR WITH MESH;  Surgeon: Reji Pizano MD;  Location: [facility]     ORTHOPEDIC SURGERY      scope of both shoulders     VASECTOMY       Prior to Admission Medications   Prior to Admission Medications   Prescriptions Last Dose Informant Patient Reported? Taking?   ASPIRIN EC PO 7/29/2021 at Unknown time Self Yes Yes   Sig: Take 81 mg by mouth daily   Flaxseed, Linseed, (FLAX SEED OIL) 1000 MG capsule 7/29/2021 at Unknown time Self Yes Yes   Sig: Take 1 capsule by mouth daily   Lysine 500 MG TABS 7/29/2021 at Unknown time Self Yes Yes   Sig: Take 1 tablet by mouth daily.   MINOCYCLINE HCL PO 7/29/2021 at Unknown time Self Yes Yes   Sig: Take 100 mg by mouth daily as needed (acne)    Multiple Vitamin (MULTI-VITAMIN) per tablet 7/29/2021 at Unknown time Self Yes Yes   Sig: Take 1 tablet by mouth daily.   amLODIPine (NORVASC) 5 MG tablet 7/29/2021 at Unknown time  No Yes   Sig: Take 1 tablet (5 mg) by mouth daily   atorvastatin (LIPITOR) 10 MG tablet 7/29/2021 at Unknown time  No Yes   Sig: Take 1 tablet (10 mg) by mouth every other day   glucosamine-chondroitin 500-400 MG CAPS 7/29/2021 at Unknown time Self Yes Yes   Sig: Take 2 capsules by mouth daily    hydrocortisone (WESTCORT) 0.2 % cream Past Week at Unknown time Self Yes Yes   Sig: Apply topically 2 times daily as needed    isosorbide mononitrate (IMDUR) 30 MG 24 hr tablet 7/30/2021 at Unknown time  No Yes   Sig: Take 1 tablet (30 mg) by mouth daily   loratadine (CLARITIN) 10 MG capsule 7/30/2021 at Unknown time  Yes Yes   Sig: Take 10 mg by mouth daily   magnesium 500 MG TABS 7/29/2021 at Unknown time Self Yes Yes   Sig: Take 500 mg by mouth every evening   naproxen sodium (ANAPROX) 220 MG tablet Past Week at Unknown time  Yes  Yes   Sig: Take by mouth 2 times daily as needed for moderate pain   nitroGLYcerin (NITROSTAT) 0.4 MG sublingual tablet 7/29/2021 at 1400  No No   Sig: Place 1 tablet (0.4 mg) under the tongue every 5 minutes as needed for chest pain Administer every 5 minutes as needed.  Maximum 3 doses in 15 minutes.   Patient not taking: Reported on 7/30/2021      Facility-Administered Medications: None     Allergies   Allergies   Allergen Reactions     Seasonal Allergies      Hydrocodone-Acetaminophen Other (See Comments)     FEELS WIRED  FEELS WIRED  Hyperactive, insomnia.        Molds & Smuts      Social History   I have reviewed this patient's social history and updated it with pertinent information if needed. Chris Barriga  reports that he has never smoked. He has never used smokeless tobacco. He reports current alcohol use. He reports that he does not use drugs.    Family History   I have reviewed this patient's family history and updated it with pertinent information if needed.   Family History   Problem Relation Age of Onset     Breast Cancer Mother      Cerebrovascular Disease Father      Breast Cancer Maternal Grandmother      Breast Cancer Son      Colon Cancer No family hx of      Review of Systems   The 10 point Review of Systems is negative other than noted in the HPI or here.    Physical Exam   Temp: 97.7  F (36.5  C) Temp src: Oral BP: 95/54 Pulse: 53   Resp: 16 SpO2: 98 % O2 Device: None (Room air) Oxygen Delivery: 2 LPM  Vital Signs with Ranges  Temp:  [97.7  F (36.5  C)-98.5  F (36.9  C)] 97.7  F (36.5  C)  Pulse:  [53-74] 53  Resp:  [16] 16  BP: ()/(54-70) 95/54  SpO2:  [98 %-99 %] 98 %  179 lbs 12.8 oz    Constitutional: awake, alert, cooperative, no apparent distress, laying in hospital bed  Eyes: sclera clear, conjunctiva normal  ENT: oral pharynx with moist mucous membranes  Respiratory: clear to auscultation bilaterally, no crackles or wheezing  Cardiovascular: regular rate and rhythm,  normal S1 and S2, no murmur noted  GI: normal bowel sounds, soft, non-distended, non-tender  Skin: warm, dry  Musculoskeletal: no lower extremity pitting edema present  Neurologic: awake, alert, oriented to name, place and time, motor is 5 out of 5 bilaterally, sensory is intact    Data   Data reviewed today:  I personally reviewed no images or EKG's today.  Recent Labs   Lab 07/30/21  1225 07/30/21  1224 07/28/21  0431 07/28/21  0051 07/27/21  2253 07/27/21  1503   WBC 5.9  --   --   --   --  5.8   HGB 14.4  --   --   --   --  15.3   MCV 96  --   --   --   --  95     --   --   --   --  187   INR  --  0.97  --   --   --   --      --   --   --   --  140   POTASSIUM 4.8  --   --   --   --  3.9   CHLORIDE 109  --   --   --   --  107   CO2 28  --   --   --   --  30   BUN 19  --   --   --   --  21   CR 0.89  --   --   --   --  0.77   ANIONGAP 1*  --   --   --   --  3   DANY 8.8  --   --   --   --  9.1   *  --   --   --   --  96   TROPONIN  --   --  <0.015 <0.015 <0.015 <0.015     Recent Results (from the past 24 hour(s))   Cardiac Catheterization    Narrative    1.  Successful complex, HD IVUS guided PCI of the highly tortuous LCx/OM1   bifurcation with stenting back to the LCx ostium.  Bifurcation stenting   was performed in a [place] technique.  There is mild-moderate   underexpansion of the ostial portion of the LCx stent due to significant   underlying calcium burden.  2.  Moderate in-stent restenosis of the prior proximal LAD stent.  3.  Successful closure of the 8 Welsh RFA access site with a single 8   Welsh Angio-Seal closure device.

## 2021-07-31 NOTE — DISCHARGE SUMMARY
[facility]    Discharge Summary  Hospitalist    Date of Admission:  7/30/2021  Date of Discharge:  7/31/2021  Discharging Provider: Fabian Chun MD  Date of Service (when I saw the patient): 07/31/21    Discharge Diagnoses   NSTEMI  Coronary artery disease  Hypertension  Hyperlipidemia    History of Present Illness   Chris Barriga is a 60 year old male with a history of coronary artery disease, hypertension, and hyperlipidemia who was admitted to the hospital following complex PCI.    Hospital Course   Chris Barriga was admitted on 7/30/2021.  The following problems were addressed during his hospitalization:    NSTEMI  Coronary artery disease  Hypertension  Hyperlipidemia  Initially admitted on 7/27/21 with unstable angina. Underwent unsuccessful PCI to culprit OM-1 occlusion. Medical management was recommended and he was discharged home. He had ongoing symptoms and followed up with cardiology on 7/30/21. Arrangements were made for him to go to the cath lab for repeat angiogram with attempt at complex PCI. He is status post successful complex PCI of a highly tortuous left circumflex/OM1 bifurcation lesion with stenting back to the left circumflex ostium. He was subsequently admitted to the hospital for ongoing management.    Cardiology consulted, appreciate their assistance.    Aspirin, prasugrel, and rosuvastatin per cardiology orders.    Previously on atorvastatin, switch to high-dose rosuvastatin during this admission. LDL is low, consider decreasing dose of statin in the clinic.    Started on amlodipine and Imdur recently as antianginals, will continue for now, can reassess at follow-up appointment.    Cardiology recommending return to Cath Lab in 4 to 6 weeks per cardiology notes for FFR of the proximal LAD and possible shockwave of the proximal left circumflex stent for improved expansion.    He did well with cardiac rehab and will need outpatient cardiac  rehab.    Discharge home today.    Follow-up with PCP and cardiology in one week.    Discussed reasons to seek medical attention prior to follow-up appointment.    Fabian Chun MD    Significant Results and Procedures   Complex PCI as noted above.    Pending Results   None    Code Status   Full Code       Primary Care Physician   Bharathi Chowdary    Physical Exam   Temp: 98.2  F (36.8  C) Temp src: Oral BP: 108/72 (Pre CR) Pulse: 79   Resp: 16 SpO2: 98 % O2 Device: None (Room air) Oxygen Delivery: 2 LPM  Vitals:    07/30/21 1202 07/31/21 0512   Weight: 81.6 kg (179 lb 12.8 oz) 82.1 kg (181 lb)     Vital Signs with Ranges  Temp:  [97.7  F (36.5  C)-98.2  F (36.8  C)] 98.2  F (36.8  C)  Pulse:  [53-79] 79  Resp:  [16-18] 16  BP: ()/(52-72) 108/72  SpO2:  [96 %-100 %] 98 %  I/O last 3 completed shifts:  In: 600 [P.O.:600]  Out: -     Constitutional: awake, alert, cooperative, no apparent distress, laying in the hospital bed  Respiratory: clear to auscultation bilaterally, no crackles or wheezing  Cardiovascular: regular rate and rhythm, normal S1 and S2, no murmur noted  GI: normal bowel sounds, soft, non-distended, non-tender  Skin: warm, dry  Musculoskeletal: no lower extremity pitting edema present  Neurologic: awake, alert, oriented to name, place and time, motor is 5 out of 5 bilaterally, sensory is intact    Discharge Disposition   Discharged to home  Condition at discharge: Stable    Consultations This Hospital Stay   NUTRITION SERVICES ADULT IP CONSULT  CARDIAC REHAB IP CONSULT  PHARMACY IP CONSULT  PHARMACY IP CONSULT  CARDIOLOGY IP CONSULT  SMOKING CESSATION PROGRAM IP CONSULT    Time Spent on this Encounter   I, Fabian Chun MD, personally saw the patient today and spent greater than 30 minutes discharging this patient.    Discharge Orders      CARDIAC REHAB REFERRAL      Reason for your hospital stay    Coronary artery disease. Complex PCI with placement of 4 stents.     Follow-up and  recommended labs and tests     Follow up with primary care provider, Bharathi Chowdary, within 7 days for hospital follow- up.  No follow up labs or test are needed.    Follow up with Cardiology in one week.     Activity    Your activity upon discharge: activity as directed by cardiology and cardiac rehab     Diet    Follow this diet upon discharge: Low Saturated Fat, Sodium <2400 mg     Discharge Medications   Current Discharge Medication List      START taking these medications    Details   acetaminophen (TYLENOL) 325 MG tablet Take 2 tablets (650 mg) by mouth every 4 hours as needed for mild pain or headaches    Associated Diagnoses: Pain      prasugrel (EFFIENT) 10 MG TABS tablet Take 1 tablet (10 mg) by mouth daily  Qty: 30 tablet, Refills: 0    Associated Diagnoses: Coronary artery disease involving native coronary artery of native heart without angina pectoris      rosuvastatin (CRESTOR) 40 MG tablet Take 1 tablet (40 mg) by mouth daily  Qty: 30 tablet, Refills: 0    Associated Diagnoses: Coronary artery disease involving native coronary artery of native heart without angina pectoris         CONTINUE these medications which have NOT CHANGED    Details   amLODIPine (NORVASC) 5 MG tablet Take 1 tablet (5 mg) by mouth daily  Qty: 30 tablet, Refills: 0    Associated Diagnoses: Coronary artery disease involving native coronary artery of native heart without angina pectoris      ASPIRIN EC PO Take 81 mg by mouth daily      Flaxseed, Linseed, (FLAX SEED OIL) 1000 MG capsule Take 1 capsule by mouth daily      glucosamine-chondroitin 500-400 MG CAPS Take 2 capsules by mouth daily       hydrocortisone (WESTCORT) 0.2 % cream Apply topically 2 times daily as needed       isosorbide mononitrate (IMDUR) 30 MG 24 hr tablet Take 1 tablet (30 mg) by mouth daily  Qty: 90 tablet, Refills: 3    Associated Diagnoses: Coronary artery disease involving native coronary artery of native heart without angina pectoris      loratadine  (CLARITIN) 10 MG capsule Take 10 mg by mouth daily      Lysine 500 MG TABS Take 1 tablet by mouth daily.      magnesium 500 MG TABS Take 500 mg by mouth every evening      MINOCYCLINE HCL PO Take 100 mg by mouth daily as needed (acne)       Multiple Vitamin (MULTI-VITAMIN) per tablet Take 1 tablet by mouth daily.      nitroGLYcerin (NITROSTAT) 0.4 MG sublingual tablet Place 1 tablet (0.4 mg) under the tongue every 5 minutes as needed for chest pain Administer every 5 minutes as needed.  Maximum 3 doses in 15 minutes.  Qty: 30 tablet, Refills: 0    Associated Diagnoses: Coronary artery disease involving native coronary artery of native heart with unstable angina pectoris (H)         STOP taking these medications       atorvastatin (LIPITOR) 10 MG tablet Comments:   Reason for Stopping:         naproxen sodium (ANAPROX) 220 MG tablet Comments:   Reason for Stopping:             Allergies   Allergies   Allergen Reactions     Seasonal Allergies      Hydrocodone-Acetaminophen Other (See Comments)     FEELS WIRED  FEELS WIRED  Hyperactive, insomnia.        [employer]      Data   Most Recent 3 CBC's:Recent Labs   Lab Test 07/30/21  1225 07/27/21  1503 08/21/17  0000   WBC 5.9 5.8 5.5   HGB 14.4 15.3 15.4   MCV 96 95 92    187 142      Most Recent 3 BMP's:  Recent Labs   Lab Test 07/31/21  0606 07/30/21  1225 07/27/21  1503    138 140   POTASSIUM 4.0 4.8 3.9   CHLORIDE 110* 109 107   CO2 28 28 30   BUN 23 19 21   CR 0.88 0.89 0.77   ANIONGAP <1* 1* 3   DANY 8.0* 8.8 9.1   GLC 95 109* 96     Most Recent INR's and Anticoagulation Dosing History:  Anticoagulation Dose History     Recent Dosing and Labs Latest Ref Rng & Units 5/25/2012 7/30/2021    INR 0.85 - 1.15 - 0.97    INR 0.86 - 1.14 0.89 -        Most Recent 3 Troponin's:  Recent Labs   Lab Test 07/28/21  0431 07/28/21  0051 07/27/21  2253   TROPONIN <0.015 <0.015 <0.015     Most Recent Cholesterol Panel:  Recent Labs   Lab Test 07/31/21  0003   CHOL 105    LDL 26   HDL 66   TRIG 64     Most Recent TSH, T4 and A1c Labs:  Recent Labs   Lab Test 07/28/21  0431   A1C 5.0     Results for orders placed or performed during the hospital encounter of 07/30/21   Cardiac Catheterization    Narrative    1.  Successful complex, HD IVUS guided PCI of the highly tortuous LCx/OM1   bifurcation with stenting back to the LCx ostium.  Bifurcation stenting   was performed in a [place] technique.  There is mild-moderate   underexpansion of the ostial portion of the LCx stent due to significant   underlying calcium burden.  2.  Moderate in-stent restenosis of the prior proximal LAD stent.  3.  Successful closure of the 8 [place] RFA access site with a single 8   [place] Angio-Seal closure device.

## 2021-07-31 NOTE — PLAN OF CARE
Patient alert, calls for assist appropriately. Pt had angio via right groin with angioseal, site wnl until up to BR after bedrestcomplete, site started bleeding, pressure held for 15 minutes, new angioseal placed. Has been wnl, scant bruising inferior to site, no sign of hematoma, neg bruit. CMS pulse wnl.Up to BR this am and no bleeding. BP 90's systolic ,no symptoms . SR/SB 50's. Chest discomfort with touch. Review POC with patient.

## 2021-08-02 ENCOUNTER — MYC MEDICAL ADVICE (OUTPATIENT)
Dept: CARDIOLOGY | Facility: CLINIC | Age: 61
End: 2021-08-02

## 2021-08-02 ENCOUNTER — TELEPHONE (OUTPATIENT)
Dept: CARDIOLOGY | Facility: CLINIC | Age: 61
End: 2021-08-02

## 2021-08-02 NOTE — TELEPHONE ENCOUNTER
"My Chart message 8-2-21  Dr. Park / Junie (please copy Junie Matthew on this message):     After last week's \"wild ride\", I am following up with a few questions / requests per my recent heart procedures:  - Overall, I am feeling really good, all things considered.  The previous chest pain is gone; I still have a small amount of chest discomfort that is subsiding a bit each day - I believe it is from the extended surgery on Friday that I still feel a little beat up.  - I thought contacting you directly for a post surgery follow up meeting per my discharge order made the most sense.  When I called the regular scheduling line last week it appeared there weren't any normal openings for quite some time.  My only scheduled appointment right now is with Cardiac Rehab on 8/5 at 2:45 p.m. in [place].  - A few topics to discuss include Dr. Morales's recommendation for a follow up procedure in 4 - 6 weeks; current medications / review what I should / should not be taking as we adjusted some things several times in the last week;  and a few other items.  - Can / should Junie Matthew and Dr. Morales be added to my care team on [facility] now that they have been very involved in my care recently?  I noticed that neither of them appear on my care team.  Let me know if this makes sense.     Thanks again to Junie, Dr. Morales, and the rest of the cardiac team for taking excellent care of me and my difficult case last week.  We really appreciate it. Please share our thanks with the team.     Chris Barriga ([phone])  "

## 2021-08-02 NOTE — TELEPHONE ENCOUNTER
I would get him in with Junie in the next week or 2 to review meds and set up next angiogram.  I discussed the case with Nathaniel Morales.  I would set him up for the follow-up angiogram with Nathaniel in 4 to 6 weeks as Nathaniel recommended.  It will be to relook at his circumflex and proximal LAD.  Okay to add them to the team.

## 2021-08-02 NOTE — TELEPHONE ENCOUNTER
Per Dr. Park;s reply from My Chart message -   I would get him in with Junie in the next week or 2 to review meds and set up next angiogram.  I discussed the case with Nathaniel Morales.  I would set him up for the follow-up angiogram with Nathaniel in 4 to 6 weeks as Nathaniel recommended.  It will be to relook at his circumflex and proximal LAD.  Okay to add them to the team.    Patient called. Patient agrees with a 1-2 week F/Up with Junie MELTON for medication review and to discuss need for next angiogram. Patient prefers to schedule angiogram at that visit.    GERONIMO visit ordered at time of discharge post 2nd heart cath. . Scheduling messaged to call and arrange.

## 2021-08-03 LAB
ATRIAL RATE - MUSE: 53 BPM
ATRIAL RATE - MUSE: 59 BPM
ATRIAL RATE - MUSE: 75 BPM
DIASTOLIC BLOOD PRESSURE - MUSE: NORMAL MMHG
INTERPRETATION ECG - MUSE: NORMAL
P AXIS - MUSE: 61 DEGREES
P AXIS - MUSE: 72 DEGREES
P AXIS - MUSE: 73 DEGREES
PR INTERVAL - MUSE: 168 MS
PR INTERVAL - MUSE: 170 MS
PR INTERVAL - MUSE: 180 MS
QRS DURATION - MUSE: 106 MS
QRS DURATION - MUSE: 110 MS
QRS DURATION - MUSE: 92 MS
QT - MUSE: 388 MS
QT - MUSE: 412 MS
QT - MUSE: 442 MS
QTC - MUSE: 407 MS
QTC - MUSE: 414 MS
QTC - MUSE: 433 MS
R AXIS - MUSE: 58 DEGREES
R AXIS - MUSE: 69 DEGREES
R AXIS - MUSE: 72 DEGREES
SYSTOLIC BLOOD PRESSURE - MUSE: NORMAL MMHG
T AXIS - MUSE: 57 DEGREES
T AXIS - MUSE: 63 DEGREES
T AXIS - MUSE: 69 DEGREES
VENTRICULAR RATE- MUSE: 53 BPM
VENTRICULAR RATE- MUSE: 59 BPM
VENTRICULAR RATE- MUSE: 75 BPM

## 2021-08-03 NOTE — TELEPHONE ENCOUNTER
Upcoming GERONIMO visit 8-18-21.  SPpoke with Patient regarding F/Up heart cath and the do H&P for possible next heart cath.     Patient agreeable, Patient wonders what will be done at that heart cath. - Is it just to re look at stents?    Patient will discuss questions at that visit.

## 2021-08-05 ENCOUNTER — HOSPITAL ENCOUNTER (OUTPATIENT)
Dept: CARDIAC REHAB | Facility: CLINIC | Age: 61
End: 2021-08-05
Attending: INTERNAL MEDICINE
Payer: COMMERCIAL

## 2021-08-05 DIAGNOSIS — I25.10 CORONARY ARTERY DISEASE INVOLVING NATIVE CORONARY ARTERY OF NATIVE HEART WITHOUT ANGINA PECTORIS: ICD-10-CM

## 2021-08-05 PROCEDURE — 93798 PHYS/QHP OP CAR RHAB W/ECG: CPT

## 2021-08-05 PROCEDURE — 93797 PHYS/QHP OP CAR RHAB WO ECG: CPT | Mod: XU

## 2021-08-09 ENCOUNTER — HOSPITAL ENCOUNTER (OUTPATIENT)
Dept: CARDIAC REHAB | Facility: CLINIC | Age: 61
End: 2021-08-09
Attending: INTERNAL MEDICINE
Payer: COMMERCIAL

## 2021-08-09 PROCEDURE — 93798 PHYS/QHP OP CAR RHAB W/ECG: CPT

## 2021-08-11 ENCOUNTER — HOSPITAL ENCOUNTER (OUTPATIENT)
Dept: CARDIAC REHAB | Facility: CLINIC | Age: 61
End: 2021-08-11
Attending: INTERNAL MEDICINE
Payer: COMMERCIAL

## 2021-08-11 DIAGNOSIS — I25.10 CORONARY ARTERY DISEASE INVOLVING NATIVE CORONARY ARTERY OF NATIVE HEART WITHOUT ANGINA PECTORIS: ICD-10-CM

## 2021-08-11 PROCEDURE — 93798 PHYS/QHP OP CAR RHAB W/ECG: CPT

## 2021-08-11 RX ORDER — ROSUVASTATIN CALCIUM 40 MG/1
40 TABLET, COATED ORAL DAILY
Qty: 90 TABLET | Refills: 1 | Status: SHIPPED | OUTPATIENT
Start: 2021-08-11 | End: 2021-12-01

## 2021-08-16 ENCOUNTER — HOSPITAL ENCOUNTER (OUTPATIENT)
Dept: CARDIAC REHAB | Facility: CLINIC | Age: 61
End: 2021-08-16
Attending: INTERNAL MEDICINE
Payer: COMMERCIAL

## 2021-08-16 PROCEDURE — 93798 PHYS/QHP OP CAR RHAB W/ECG: CPT | Performed by: REHABILITATION PRACTITIONER

## 2021-08-17 ENCOUNTER — APPOINTMENT (OUTPATIENT)
Dept: URBAN - METROPOLITAN AREA CLINIC 256 | Age: 61
Setting detail: DERMATOLOGY
End: 2021-08-17

## 2021-08-17 VITALS — RESPIRATION RATE: 14 BRPM | HEIGHT: 69 IN | WEIGHT: 165 LBS

## 2021-08-17 DIAGNOSIS — L82.1 OTHER SEBORRHEIC KERATOSIS: ICD-10-CM

## 2021-08-17 DIAGNOSIS — Z85.828 PERSONAL HISTORY OF OTHER MALIGNANT NEOPLASM OF SKIN: ICD-10-CM

## 2021-08-17 DIAGNOSIS — L30.4 ERYTHEMA INTERTRIGO: ICD-10-CM

## 2021-08-17 DIAGNOSIS — L81.4 OTHER MELANIN HYPERPIGMENTATION: ICD-10-CM

## 2021-08-17 DIAGNOSIS — L57.8 OTHER SKIN CHANGES DUE TO CHRONIC EXPOSURE TO NONIONIZING RADIATION: ICD-10-CM

## 2021-08-17 DIAGNOSIS — L73.8 OTHER SPECIFIED FOLLICULAR DISORDERS: ICD-10-CM

## 2021-08-17 DIAGNOSIS — D18.0 HEMANGIOMA: ICD-10-CM

## 2021-08-17 PROBLEM — D18.01 HEMANGIOMA OF SKIN AND SUBCUTANEOUS TISSUE: Status: ACTIVE | Noted: 2021-08-17

## 2021-08-17 PROCEDURE — 99213 OFFICE O/P EST LOW 20 MIN: CPT

## 2021-08-17 PROCEDURE — OTHER COUNSELING: OTHER

## 2021-08-17 PROCEDURE — OTHER REASSURANCE: OTHER

## 2021-08-17 ASSESSMENT — LOCATION ZONE DERM
LOCATION ZONE: FACE
LOCATION ZONE: EAR

## 2021-08-17 ASSESSMENT — LOCATION SIMPLE DESCRIPTION DERM
LOCATION SIMPLE: LEFT CHEEK
LOCATION SIMPLE: RIGHT CHEEK
LOCATION SIMPLE: RIGHT EAR

## 2021-08-17 ASSESSMENT — LOCATION DETAILED DESCRIPTION DERM
LOCATION DETAILED: LEFT INFERIOR PREAURICULAR CHEEK
LOCATION DETAILED: RIGHT SCAPHA
LOCATION DETAILED: RIGHT CENTRAL MALAR CHEEK
LOCATION DETAILED: LEFT MEDIAL MALAR CHEEK

## 2021-08-18 ENCOUNTER — OFFICE VISIT (OUTPATIENT)
Dept: CARDIOLOGY | Facility: CLINIC | Age: 61
End: 2021-08-18
Payer: COMMERCIAL

## 2021-08-18 ENCOUNTER — HOSPITAL ENCOUNTER (OUTPATIENT)
Dept: CARDIAC REHAB | Facility: CLINIC | Age: 61
End: 2021-08-18
Attending: INTERNAL MEDICINE
Payer: COMMERCIAL

## 2021-08-18 ENCOUNTER — HOSPITAL ENCOUNTER (OUTPATIENT)
Facility: CLINIC | Age: 61
End: 2021-08-18
Payer: COMMERCIAL

## 2021-08-18 VITALS
HEART RATE: 78 BPM | BODY MASS INDEX: 25.2 KG/M2 | HEIGHT: 71 IN | SYSTOLIC BLOOD PRESSURE: 106 MMHG | DIASTOLIC BLOOD PRESSURE: 75 MMHG | WEIGHT: 180 LBS

## 2021-08-18 DIAGNOSIS — R93.1 ABNORMAL FINDINGS ON DIAGNOSTIC IMAGING OF HEART AND CORONARY CIRCULATION: ICD-10-CM

## 2021-08-18 DIAGNOSIS — I25.10 CORONARY ARTERY DISEASE INVOLVING NATIVE CORONARY ARTERY OF NATIVE HEART WITHOUT ANGINA PECTORIS: Primary | ICD-10-CM

## 2021-08-18 DIAGNOSIS — I25.10 CORONARY ARTERY DISEASE INVOLVING NATIVE CORONARY ARTERY OF NATIVE HEART WITHOUT ANGINA PECTORIS: ICD-10-CM

## 2021-08-18 DIAGNOSIS — Z11.59 ENCOUNTER FOR SCREENING FOR OTHER VIRAL DISEASES: ICD-10-CM

## 2021-08-18 PROCEDURE — 99214 OFFICE O/P EST MOD 30 MIN: CPT | Performed by: PHYSICIAN ASSISTANT

## 2021-08-18 PROCEDURE — 93798 PHYS/QHP OP CAR RHAB W/ECG: CPT | Performed by: REHABILITATION PRACTITIONER

## 2021-08-18 RX ORDER — METOPROLOL SUCCINATE 25 MG/1
25 TABLET, EXTENDED RELEASE ORAL DAILY
Qty: 90 TABLET | Refills: 3 | Status: SHIPPED | OUTPATIENT
Start: 2021-08-18 | End: 2021-09-13

## 2021-08-18 RX ORDER — LOSARTAN POTASSIUM 25 MG/1
25 TABLET ORAL DAILY
Qty: 90 TABLET | Refills: 3 | Status: SHIPPED | OUTPATIENT
Start: 2021-08-18 | End: 2021-09-13

## 2021-08-18 ASSESSMENT — MIFFLIN-ST. JEOR: SCORE: 1653.6

## 2021-08-18 NOTE — LETTER
8/18/2021    Bharathi Chowdary MD  [address]  [facility] [address]    RE: Chris Barriga       Dear Colleague,    I had the pleasure of seeing Chris Barriga in the [facility].      Cardiology Clinic Progress Note    Chris Barriga MRN# [id number]   YOB: 1960 Age: 60 year old   Primary cardiologist: Dr. Park         Assessment and Plan:     In summary, Chris Barriga presents today for follow up after complex PCI of the highly tortuous LCx/OM1 bifurcation with stenting back to the LCx ostium, on 7/30/21 with Dr. Morales. His angina has completely resolved. He was noted to have some under-expansion at the ostial LCX as well as mild-moderate ISR of his prior prox LAD stent. For this reason, it is recommended he return to the cath lab for reassessment with possible shockwave in September.    Risks and benefits of left heart catheterization and coronary angiogram were discussed with the patient in detail. 0.1-0.3% (for diagnostic angio) and 1-2% (for PCI)  risk of stroke, MI, death, emergent bypass for diagnostic angio, risk of contrast induced allergic reaction, renal dysfunction, vascular complications were discussed. Patient understands and wishes to proceed.     In the meantime, he will continue aspirin, Effient, and Crestor. He'll stop Imdur and amlodipine, and re-start Losartan at a lower dose (25 mg daily). We will add Toprol 25 mg daily. We'll continue close monitoring through cardiac rehab.     I've asked him to return to see me in 1-2 weeks after the procedure, and to see Dr. Park in the next 2-3 months, with a lipid panel / ALT.        History of Presenting Illness:      Chris Barriga is a pleasant 60 year old patient who presents today for follow up after elective PCI for unstable angina.    He has a history of CAD, NSTEMI in 2012, s/p PCI to prox LAD. Since that time, he has been  "described as a \"model patient,\" compliant with routine rigorous exercise, a heart-healthy diet, and medications.    He was doing incredibly well, until he developed exertional angina in July of this year, primarily with bike rides. A stress echo was ordered. This took place on 7/23 and showed distal anterolateral ischemia in a diagonal / OM distribution post stress with chest discomfort during the test (at peak exercise). He exercised 15 METs without ischemic changes on EKG. BP response to exercise was normal. Amlodipine was started and an office visit was arranged.  When I met Chava to review his stress echo on July 27th, he relayed an episode of rest pain the night prior. I directed him to the ED for admission and angiogram. Troponins were negative. Angiogram took place the following day. This showed that his previously placed ostial to proximal LAD stent was widely patent.  He had a culprit 95% OM1 lesion which was eccentric and thrombotic.  Due to significant tortuosity Dr. Martin was unable to cross the lesion.  Therefore, medical management was recommended for the time being.  Imdur was initiated, and Losartan was discontinued. He was also switched from Lipitor to Crestor 40, with an LDL already in the 20's.   When I saw him again on July 30th, he continued to experience intermittent chest pain, which was present to a mild degree in clinic. After discussion with Dr. Morales, he was taken back to the cath lab, where he had an extensive several hour procedure resulting in successful, HD IVUS guided PCI of the highly tortuous LCx/OM1 bifurcation with stenting back to the LCx ostium.  There was mild-moderate underexpansion of the ostial portion of the LCx stent due to significant underlying calcium burden. There was also noted to be moderate in-stent restenosis of the prior proximal LAD stent. He discharged home the following day with aspirin and Effient. It was recommended he return to the cath lab in 4-6 weeks for " "FFR of the proximal LAD and possible shockwave of the proximal LCx stent for improved expansion.     Today, Chava returns to clinic stating he feels 10x better. He's had no recurrent chest pain. He denies shortness of breath, PND, orthopnea, edema. BP is well-controlled. He's been compliant with his medications. Still some lightheadedness with Imdur. He's participating in cardiac rehab and limiting his home exercises to what he's been doing in rehab. Some residual R groin bruising which has moved down the leg, but overall this is healing very normally. We reviewed his recent course and recommended future procedure.          Review of Systems:     12-pt ROS is negative except for as noted in the HPI.          Physical Exam:     Vitals: /75   Pulse 78   Ht 1.803 m (5' 11\")   Wt 81.6 kg (180 lb)   BMI 25.10 kg/m    Wt Readings from Last 10 Encounters:   08/18/21 81.6 kg (180 lb)   07/31/21 82.1 kg (181 lb)   07/30/21 81.2 kg (179 lb)   07/28/21 79.5 kg (175 lb 4.8 oz)   05/29/19 82.6 kg (182 lb 3.2 oz)   11/13/18 80.2 kg (176 lb 12.8 oz)   10/06/17 81.6 kg (179 lb 12.8 oz)   09/06/17 81 kg (178 lb 8 oz)   05/22/17 77.1 kg (170 lb)   02/17/17 79.4 kg (175 lb)       Constitutional:  Patient is pleasant, alert, cooperative, and in NAD.  HEENT:  NCAT. PERRLA. EOM's intact.   Neck:  CVP appears normal. No carotid bruits.   Pulmonary: Normal respiratory effort. CTAB.   Cardiac: RRR, normal S1/S2, no S3/S4, no murmur or rub.   Abdomen:  Non-tender abdomen, no hepatosplenomegaly appreciated.   Vascular: Pulses in the upper and lower extremities are 2+ and equal bilaterally.  Extremities: No edema, erythema, cyanosis or tenderness appreciated.  Skin:  No rashes or lesions appreciated.   Neurological:  No gross motor or sensory deficits.   Psych: Appropriate affect.        Data:     Labs reviewed:  Recent Labs   Lab Test 07/31/21  0003 07/28/21  0431 01/21/21  0824   LDL 26 50 35   HDL 66 92 91   NHDL 39 63 56   CHOL 105 " 155 147   TRIG 64 67 107       Lab Results   Component Value Date    WBC 5.9 07/30/2021    WBC 5.5 08/21/2017    RBC 4.37 (L) 07/30/2021    RBC 4.81 08/21/2017    HGB 14.4 07/30/2021    HGB 15.4 08/21/2017    HCT 41.8 07/30/2021    HCT 44.3 08/21/2017    MCV 96 07/30/2021    MCV 92 08/21/2017    MCH 33.0 07/30/2021    MCH 32 08/21/2017    MCHC 34.4 07/30/2021    MCHC 34.8 08/21/2017    RDW 13.2 07/30/2021    RDW 13 08/21/2017     07/30/2021     08/21/2017       Lab Results   Component Value Date     07/31/2021     01/21/2021    POTASSIUM 4.0 07/31/2021    POTASSIUM 4.2 01/21/2021    CHLORIDE 110 (H) 07/31/2021    CHLORIDE 106 01/21/2021    CO2 28 07/31/2021    CO2 30 01/21/2021    ANIONGAP <1 (L) 07/31/2021    ANIONGAP 2 (L) 01/21/2021    GLC 95 07/31/2021    GLC 97 01/21/2021    BUN 23 07/31/2021    BUN 20 01/21/2021    CR 0.88 07/31/2021    CR 0.90 01/21/2021    GFRESTIMATED >90 07/31/2021    GFRESTIMATED >90 01/21/2021    GFRESTBLACK >90 01/21/2021    DANY 8.0 (L) 07/31/2021    DANY 8.9 01/21/2021      Lab Results   Component Value Date    AST 21 09/04/2018    ALT 24 09/17/2020       Lab Results   Component Value Date    A1C 5.0 07/28/2021       Lab Results   Component Value Date    INR 0.97 07/30/2021    INR 0.89 05/25/2012           Problem List:     Patient Active Problem List   Diagnosis     Pain in joint, lower leg     Other postprocedural status(V45.89)     Iliac artery aneurysm, left (H)     Coronary artery disease involving native coronary artery of native heart without angina pectoris     Benign essential hypertension     Pure hypercholesterolemia     Aortic root dilation (H)     Ischemic cardiomyopathy     Chest pain     Abnormal cardiovascular stress test     Hx of cardiac catheterization     Abnormal findings on diagnostic imaging of heart and coronary circulation     Percutaneous transluminal coronary angioplasty status           Medications:     Current Outpatient Medications    Medication Sig Dispense Refill     acetaminophen (TYLENOL) 325 MG tablet Take 2 tablets (650 mg) by mouth every 4 hours as needed for mild pain or headaches       ASPIRIN EC PO Take 81 mg by mouth daily       Flaxseed, Linseed, (FLAX SEED OIL) 1000 MG capsule Take 1 capsule by mouth daily       glucosamine-chondroitin 500-400 MG CAPS Take 2 capsules by mouth daily        hydrocortisone (WESTCORT) 0.2 % cream Apply topically 2 times daily as needed        isosorbide mononitrate (IMDUR) 30 MG 24 hr tablet Take 1 tablet (30 mg) by mouth daily 90 tablet 3     loratadine (CLARITIN) 10 MG capsule Take 10 mg by mouth daily       Lysine 500 MG TABS Take 1 tablet by mouth daily.       magnesium 500 MG TABS Take 500 mg by mouth every evening       MINOCYCLINE HCL PO Take 100 mg by mouth daily as needed (acne)        Multiple Vitamin (MULTI-VITAMIN) per tablet Take 1 tablet by mouth daily.       prasugrel (EFFIENT) 10 MG TABS tablet Take 1 tablet (10 mg) by mouth daily 30 tablet 0     rosuvastatin (CRESTOR) 40 MG tablet Take 1 tablet (40 mg) by mouth daily 90 tablet 1     amLODIPine (NORVASC) 5 MG tablet Take 1 tablet (5 mg) by mouth daily (Patient not taking: Reported on 8/18/2021) 30 tablet 0     nitroGLYcerin (NITROSTAT) 0.4 MG sublingual tablet Place 1 tablet (0.4 mg) under the tongue every 5 minutes as needed for chest pain Administer every 5 minutes as needed.  Maximum 3 doses in 15 minutes. (Patient not taking: Reported on 7/30/2021) 30 tablet 0           Past Medical History:     Past Medical History:   Diagnosis Date     Adenoma of ascending colon      Aortic root dilation (H)      Chest pain      Coronary artery disease     5/2012: Stent to proximal LAD for unstable angina. Proximal left circumflex 30-40%. EF 60%     HTN (hypertension)      Hyperlipidaemia      Iliac artery aneurysm, left (H)      Past Surgical History:   Procedure Laterality Date     APPENDECTOMY OPEN       ARTHROSCOPY KNEE BILATERAL      both  knees     ARTHROSCOPY SHOULDER      left     C ORAL SURGERY PROCEDURE       CARDIAC SURGERY      stent x 1     COLONOSCOPY N/A 2/17/2017    Procedure: COLONOSCOPY;  Surgeon: Junie Grier MD;  Location:  OR     COLONOSCOPY  12/2016    [facility] [place]     COLONOSCOPY  05/22/2017    Dr. Grier [facility]     COLONOSCOPY N/A 5/29/2018    Procedure: COLONOSCOPY;  colonoscopy (crsal);  Surgeon: Junie Grier MD;  Location:  GI     CV HEART CATHETERIZATION WITH POSSIBLE INTERVENTION N/A 7/28/2021    Procedure: Heart Catheterization with Possible Intervention;  Surgeon: Buddy Martin MD;  Location:  HEART CARDIAC CATH LAB     CV INTRAVASULAR ULTRASOUND N/A 7/30/2021    Procedure: Intravascular Ultrasound;  Surgeon: Mitchell Moarles MD;  Location:  HEART CARDIAC CATH LAB     CV PCI STENT DRUG ELUTING N/A 7/30/2021    Procedure: Percutaneous Coronary Intervention Stent Drug Eluting;  Surgeon: Mitchell Morales MD;  Location:  HEART CARDIAC CATH LAB     LAPAROSCOPIC HERNIORRHAPHY INGUINAL  12/30/2013    Procedure: LAPAROSCOPIC HERNIORRHAPHY INGUINAL;  LAPAROSCOPIC RIGHT INGUINAL HERNIA REPAIR WITH MESH;  Surgeon: Reji Pizano MD;  Location: [facility]     ORTHOPEDIC SURGERY      scope of both shoulders     VASECTOMY       Family History   Problem Relation Age of Onset     Breast Cancer Mother      Cerebrovascular Disease Father      Breast Cancer Maternal Grandmother      Breast Cancer Son      Colon Cancer No family hx of      Social History     Socioeconomic History     Marital status:      Spouse name: Not on file     Number of children: Not on file     Years of education: Not on file     Highest education level: Not on file   Occupational History     Not on file   Tobacco Use     Smoking status: Never Smoker     Smokeless tobacco: Never Used   Vaping Use     Vaping Use: Never used   Substance and Sexual Activity     Alcohol use: Yes     Comment: 2-3 per week      Drug use: No     Sexual activity: Not on file   Other Topics Concern     Parent/sibling w/ CABG, MI or angioplasty before 65F 55M? Not Asked   Social History Narrative     Not on file     Social Determinants of Health     Financial Resource Strain:      Difficulty of Paying Living Expenses:    Food Insecurity:      Worried About Running Out of Food in the Last Year:      Ran Out of Food in the Last Year:    Transportation Needs:      Lack of Transportation (Medical):      Lack of Transportation (Non-Medical):    Physical Activity:      Days of Exercise per Week:      Minutes of Exercise per Session:    Stress:      Feeling of Stress :    Social Connections:      Frequency of Communication with Friends and Family:      Frequency of Social Gatherings with Friends and Family:      Attends Congregation Services:      Active Member of Clubs or Organizations:      Attends Club or Organization Meetings:      Marital Status:    Intimate Partner Violence:      Fear of Current or Ex-Partner:      Emotionally Abused:      Physically Abused:      Sexually Abused:            Allergies:   Seasonal allergies, Hydrocodone-acetaminophen, and Molds & smuts      HIPOLITO [facility]  Pager: [phone]        Thank you for allowing me to participate in the care of your patient.      Sincerely,     HIPOLITO Eisen[facility]  cc:   Junie Matthew PA-C  [address] [place] [id number]

## 2021-08-18 NOTE — PROGRESS NOTES
"  Cardiology Clinic Progress Note    Chris Barriga MRN# [id number]   YOB: 1960 Age: 60 year old   Primary cardiologist: Dr. Park         Assessment and Plan:     In summary, Chris Barriga presents today for follow up after complex PCI of the highly tortuous LCx/OM1 bifurcation with stenting back to the LCx ostium, on 7/30/21 with Dr. Morales. His angina has completely resolved. He was noted to have some under-expansion at the ostial LCX as well as mild-moderate ISR of his prior prox LAD stent. For this reason, it is recommended he return to the cath lab for reassessment with possible shockwave in September.    Risks and benefits of left heart catheterization and coronary angiogram were discussed with the patient in detail. 0.1-0.3% (for diagnostic angio) and 1-2% (for PCI)  risk of stroke, MI, death, emergent bypass for diagnostic angio, risk of contrast induced allergic reaction, renal dysfunction, vascular complications were discussed. Patient understands and wishes to proceed.     In the meantime, he will continue aspirin, Effient, and Crestor. He'll stop Imdur and amlodipine, and re-start Losartan at a lower dose (25 mg daily). We will add Toprol 25 mg daily. We'll continue close monitoring through cardiac rehab.     I've asked him to return to see me in 1-2 weeks after the procedure, and to see Dr. Park in the next 2-3 months, with a lipid panel / ALT.        History of Presenting Illness:      Chris Barriga is a pleasant 60 year old patient who presents today for follow up after elective PCI for unstable angina.    He has a history of CAD, NSTEMI in 2012, s/p PCI to prox LAD. Since that time, he has been described as a \"model patient,\" compliant with routine rigorous exercise, a heart-healthy diet, and medications.    He was doing incredibly well, until he developed exertional angina in July of this year, primarily with bike rides. A stress echo was ordered. This " took place on 7/23 and showed distal anterolateral ischemia in a diagonal / OM distribution post stress with chest discomfort during the test (at peak exercise). He exercised 15 METs without ischemic changes on EKG. BP response to exercise was normal. Amlodipine was started and an office visit was arranged.  When I met Chava to review his stress echo on July 27th, he relayed an episode of rest pain the night prior. I directed him to the ED for admission and angiogram. Troponins were negative. Angiogram took place the following day. This showed that his previously placed ostial to proximal LAD stent was widely patent.  He had a culprit 95% OM1 lesion which was eccentric and thrombotic.  Due to significant tortuosity Dr. Martin was unable to cross the lesion.  Therefore, medical management was recommended for the time being.  Imdur was initiated, and Losartan was discontinued. He was also switched from Lipitor to Crestor 40, with an LDL already in the 20's.   When I saw him again on July 30th, he continued to experience intermittent chest pain, which was present to a mild degree in clinic. After discussion with Dr. Morales, he was taken back to the cath lab, where he had an extensive several hour procedure resulting in successful, HD IVUS guided PCI of the highly tortuous LCx/OM1 bifurcation with stenting back to the LCx ostium.  There was mild-moderate underexpansion of the ostial portion of the LCx stent due to significant underlying calcium burden. There was also noted to be moderate in-stent restenosis of the prior proximal LAD stent. He discharged home the following day with aspirin and Effient. It was recommended he return to the cath lab in 4-6 weeks for FFR of the proximal LAD and possible shockwave of the proximal LCx stent for improved expansion.     Today, Chava returns to clinic stating he feels 10x better. He's had no recurrent chest pain. He denies shortness of breath, PND, orthopnea, edema. BP is  "well-controlled. He's been compliant with his medications. Still some lightheadedness with Imdur. He's participating in cardiac rehab and limiting his home exercises to what he's been doing in rehab. Some residual R groin bruising which has moved down the leg, but overall this is healing very normally. We reviewed his recent course and recommended future procedure.          Review of Systems:     12-pt ROS is negative except for as noted in the HPI.          Physical Exam:     Vitals: /75   Pulse 78   Ht 1.803 m (5' 11\")   Wt 81.6 kg (180 lb)   BMI 25.10 kg/m    Wt Readings from Last 10 Encounters:   08/18/21 81.6 kg (180 lb)   07/31/21 82.1 kg (181 lb)   07/30/21 81.2 kg (179 lb)   07/28/21 79.5 kg (175 lb 4.8 oz)   05/29/19 82.6 kg (182 lb 3.2 oz)   11/13/18 80.2 kg (176 lb 12.8 oz)   10/06/17 81.6 kg (179 lb 12.8 oz)   09/06/17 81 kg (178 lb 8 oz)   05/22/17 77.1 kg (170 lb)   02/17/17 79.4 kg (175 lb)       Constitutional:  Patient is pleasant, alert, cooperative, and in NAD.  HEENT:  NCAT. PERRLA. EOM's intact.   Neck:  CVP appears normal. No carotid bruits.   Pulmonary: Normal respiratory effort. CTAB.   Cardiac: RRR, normal S1/S2, no S3/S4, no murmur or rub.   Abdomen:  Non-tender abdomen, no hepatosplenomegaly appreciated.   Vascular: Pulses in the upper and lower extremities are 2+ and equal bilaterally.  Extremities: No edema, erythema, cyanosis or tenderness appreciated.  Skin:  No rashes or lesions appreciated.   Neurological:  No gross motor or sensory deficits.   Psych: Appropriate affect.        Data:     Labs reviewed:  Recent Labs   Lab Test 07/31/21  0003 07/28/21  0431 01/21/21  0824   LDL 26 50 35   HDL 66 92 91   NHDL 39 63 56   CHOL 105 155 147   TRIG 64 67 107       Lab Results   Component Value Date    WBC 5.9 07/30/2021    WBC 5.5 08/21/2017    RBC 4.37 (L) 07/30/2021    RBC 4.81 08/21/2017    HGB 14.4 07/30/2021    HGB 15.4 08/21/2017    HCT 41.8 07/30/2021    HCT 44.3 08/21/2017 "    MCV 96 07/30/2021    MCV 92 08/21/2017    MCH 33.0 07/30/2021    MCH 32 08/21/2017    MCHC 34.4 07/30/2021    MCHC 34.8 08/21/2017    RDW 13.2 07/30/2021    RDW 13 08/21/2017     07/30/2021     08/21/2017       Lab Results   Component Value Date     07/31/2021     01/21/2021    POTASSIUM 4.0 07/31/2021    POTASSIUM 4.2 01/21/2021    CHLORIDE 110 (H) 07/31/2021    CHLORIDE 106 01/21/2021    CO2 28 07/31/2021    CO2 30 01/21/2021    ANIONGAP <1 (L) 07/31/2021    ANIONGAP 2 (L) 01/21/2021    GLC 95 07/31/2021    GLC 97 01/21/2021    BUN 23 07/31/2021    BUN 20 01/21/2021    CR 0.88 07/31/2021    CR 0.90 01/21/2021    GFRESTIMATED >90 07/31/2021    GFRESTIMATED >90 01/21/2021    GFRESTBLACK >90 01/21/2021    DANY 8.0 (L) 07/31/2021    DANY 8.9 01/21/2021      Lab Results   Component Value Date    AST 21 09/04/2018    ALT 24 09/17/2020       Lab Results   Component Value Date    A1C 5.0 07/28/2021       Lab Results   Component Value Date    INR 0.97 07/30/2021    INR 0.89 05/25/2012           Problem List:     Patient Active Problem List   Diagnosis     Pain in joint, lower leg     Other postprocedural status(V45.89)     Iliac artery aneurysm, left (H)     Coronary artery disease involving native coronary artery of native heart without angina pectoris     Benign essential hypertension     Pure hypercholesterolemia     Aortic root dilation (H)     Ischemic cardiomyopathy     Chest pain     Abnormal cardiovascular stress test     Hx of cardiac catheterization     Abnormal findings on diagnostic imaging of heart and coronary circulation     Percutaneous transluminal coronary angioplasty status           Medications:     Current Outpatient Medications   Medication Sig Dispense Refill     acetaminophen (TYLENOL) 325 MG tablet Take 2 tablets (650 mg) by mouth every 4 hours as needed for mild pain or headaches       ASPIRIN EC PO Take 81 mg by mouth daily       Flaxseed, Linseed, (FLAX SEED OIL) 1000  MG capsule Take 1 capsule by mouth daily       glucosamine-chondroitin 500-400 MG CAPS Take 2 capsules by mouth daily        hydrocortisone (WESTCORT) 0.2 % cream Apply topically 2 times daily as needed        isosorbide mononitrate (IMDUR) 30 MG 24 hr tablet Take 1 tablet (30 mg) by mouth daily 90 tablet 3     loratadine (CLARITIN) 10 MG capsule Take 10 mg by mouth daily       Lysine 500 MG TABS Take 1 tablet by mouth daily.       magnesium 500 MG TABS Take 500 mg by mouth every evening       MINOCYCLINE HCL PO Take 100 mg by mouth daily as needed (acne)        Multiple Vitamin (MULTI-VITAMIN) per tablet Take 1 tablet by mouth daily.       prasugrel (EFFIENT) 10 MG TABS tablet Take 1 tablet (10 mg) by mouth daily 30 tablet 0     rosuvastatin (CRESTOR) 40 MG tablet Take 1 tablet (40 mg) by mouth daily 90 tablet 1     amLODIPine (NORVASC) 5 MG tablet Take 1 tablet (5 mg) by mouth daily (Patient not taking: Reported on 8/18/2021) 30 tablet 0     nitroGLYcerin (NITROSTAT) 0.4 MG sublingual tablet Place 1 tablet (0.4 mg) under the tongue every 5 minutes as needed for chest pain Administer every 5 minutes as needed.  Maximum 3 doses in 15 minutes. (Patient not taking: Reported on 7/30/2021) 30 tablet 0           Past Medical History:     Past Medical History:   Diagnosis Date     Adenoma of ascending colon      Aortic root dilation (H)      Chest pain      Coronary artery disease     5/2012: Stent to proximal LAD for unstable angina. Proximal left circumflex 30-40%. EF 60%     HTN (hypertension)      Hyperlipidaemia      Iliac artery aneurysm, left (H)      Past Surgical History:   Procedure Laterality Date     APPENDECTOMY OPEN       ARTHROSCOPY KNEE BILATERAL      both knees     ARTHROSCOPY SHOULDER      left     C ORAL SURGERY PROCEDURE       CARDIAC SURGERY      stent x 1     COLONOSCOPY N/A 2/17/2017    Procedure: COLONOSCOPY;  Surgeon: Junie Grier MD;  Location:  OR     COLONOSCOPY  12/2016    [facility] in [place]     COLONOSCOPY  05/22/2017    Dr. Grier [facility]     COLONOSCOPY N/A 5/29/2018    Procedure: COLONOSCOPY;  colonoscopy (crsal);  Surgeon: Junie Grier MD;  Location:  GI     CV HEART CATHETERIZATION WITH POSSIBLE INTERVENTION N/A 7/28/2021    Procedure: Heart Catheterization with Possible Intervention;  Surgeon: Buddy Martin MD;  Location:  HEART CARDIAC CATH LAB     CV INTRAVASULAR ULTRASOUND N/A 7/30/2021    Procedure: Intravascular Ultrasound;  Surgeon: Mitchell Morales MD;  Location:  HEART CARDIAC CATH LAB     CV PCI STENT DRUG ELUTING N/A 7/30/2021    Procedure: Percutaneous Coronary Intervention Stent Drug Eluting;  Surgeon: Mitchell Morales MD;  Location: [facility] CARDIAC CATH LAB     LAPAROSCOPIC HERNIORRHAPHY INGUINAL  12/30/2013    Procedure: LAPAROSCOPIC HERNIORRHAPHY INGUINAL;  LAPAROSCOPIC RIGHT INGUINAL HERNIA REPAIR WITH MESH;  Surgeon: Reji Pizano MD;  Location: [facility]     ORTHOPEDIC SURGERY      scope of both shoulders     VASECTOMY       Family History   Problem Relation Age of Onset     Breast Cancer Mother      Cerebrovascular Disease Father      Breast Cancer Maternal Grandmother      Breast Cancer Son      Colon Cancer No family hx of      Social History     Socioeconomic History     Marital status:      Spouse name: Not on file     Number of children: Not on file     Years of education: Not on file     Highest education level: Not on file   Occupational History     Not on file   Tobacco Use     Smoking status: Never Smoker     Smokeless tobacco: Never Used   Vaping Use     Vaping Use: Never used   Substance and Sexual Activity     Alcohol use: Yes     Comment: 2-3 per week     Drug use: No     Sexual activity: Not on file   Other Topics Concern     Parent/sibling w/ CABG, MI or angioplasty before 65F 55M? Not Asked   Social History Narrative     Not on file     Social Determinants of Health     Financial Resource Strain:       Difficulty of Paying Living Expenses:    Food Insecurity:      Worried About Running Out of Food in the Last Year:      Ran Out of Food in the Last Year:    Transportation Needs:      Lack of Transportation (Medical):      Lack of Transportation (Non-Medical):    Physical Activity:      Days of Exercise per Week:      Minutes of Exercise per Session:    Stress:      Feeling of Stress :    Social Connections:      Frequency of Communication with Friends and Family:      Frequency of Social Gatherings with Friends and Family:      Attends [employer] Services:      Active Member of Clubs or Organizations:      Attends Club or Organization Meetings:      Marital Status:    Intimate Partner Violence:      Fear of Current or Ex-Partner:      Emotionally Abused:      Physically Abused:      Sexually Abused:            Allergies:   Seasonal allergies, Hydrocodone-acetaminophen, and Molds & smuts      Junie Matthew PA-C  [facility]  Pager: [phone]

## 2021-08-18 NOTE — PATIENT INSTRUCTIONS
Today's Plan:   - Dr. Morales would like to do another procedure in September to reassess the second to last stent in the LAD with a possible shockwave to help with expansion, if needed. You can schedule that on your way out today. Keep up cardiac rehab in the meantime, you can decide if you'd like to continue it after the second procedure. I'm OK either way.  - Please stop Imdur, re-start Losartan (at 25 mg daily), and start metoprolol (25 mg daily).   - See if you can set something up with Dr. Park in the next few months. We'll re-check the cholesterol then.     If you have questions or concerns please call my nurse team at [phone].  Scheduling phone number: [phone]  For after hours urgent concerns call [phone] option 2.   Reminder: Please bring in all current medications, over the counter supplements and vitamin bottles to your next appointment.    It was a pleasure seeing you today!     Junie Matthew PA-C

## 2021-08-19 ENCOUNTER — MYC MEDICAL ADVICE (OUTPATIENT)
Dept: CARDIOLOGY | Facility: CLINIC | Age: 61
End: 2021-08-19

## 2021-08-19 DIAGNOSIS — I25.10 CORONARY ARTERY DISEASE INVOLVING NATIVE CORONARY ARTERY OF NATIVE HEART WITHOUT ANGINA PECTORIS: ICD-10-CM

## 2021-08-19 RX ORDER — PRASUGREL 10 MG/1
10 TABLET, FILM COATED ORAL DAILY
Qty: 90 TABLET | Refills: 3 | Status: SHIPPED | OUTPATIENT
Start: 2021-08-19 | End: 2022-07-12

## 2021-08-23 ENCOUNTER — HOSPITAL ENCOUNTER (OUTPATIENT)
Dept: CARDIAC REHAB | Facility: CLINIC | Age: 61
End: 2021-08-23
Attending: INTERNAL MEDICINE
Payer: COMMERCIAL

## 2021-08-23 PROCEDURE — 93798 PHYS/QHP OP CAR RHAB W/ECG: CPT

## 2021-08-25 ENCOUNTER — HOSPITAL ENCOUNTER (OUTPATIENT)
Dept: CARDIAC REHAB | Facility: CLINIC | Age: 61
End: 2021-08-25
Attending: INTERNAL MEDICINE
Payer: COMMERCIAL

## 2021-08-25 PROCEDURE — 93798 PHYS/QHP OP CAR RHAB W/ECG: CPT | Performed by: REHABILITATION PRACTITIONER

## 2021-08-30 ENCOUNTER — HOSPITAL ENCOUNTER (OUTPATIENT)
Dept: CARDIAC REHAB | Facility: CLINIC | Age: 61
End: 2021-08-30
Attending: INTERNAL MEDICINE
Payer: COMMERCIAL

## 2021-08-30 PROCEDURE — 93798 PHYS/QHP OP CAR RHAB W/ECG: CPT

## 2021-09-01 ENCOUNTER — HOSPITAL ENCOUNTER (OUTPATIENT)
Dept: CARDIAC REHAB | Facility: CLINIC | Age: 61
End: 2021-09-01
Attending: INTERNAL MEDICINE
Payer: COMMERCIAL

## 2021-09-01 PROCEDURE — 93798 PHYS/QHP OP CAR RHAB W/ECG: CPT

## 2021-09-08 ENCOUNTER — HOSPITAL ENCOUNTER (OUTPATIENT)
Dept: CARDIAC REHAB | Facility: CLINIC | Age: 61
End: 2021-09-08
Attending: INTERNAL MEDICINE
Payer: COMMERCIAL

## 2021-09-08 PROCEDURE — 93798 PHYS/QHP OP CAR RHAB W/ECG: CPT

## 2021-09-10 ENCOUNTER — TELEPHONE (OUTPATIENT)
Dept: CARDIOLOGY | Facility: CLINIC | Age: 61
End: 2021-09-10

## 2021-09-10 ENCOUNTER — HOSPITAL ENCOUNTER (OUTPATIENT)
Dept: CARDIOLOGY | Facility: CLINIC | Age: 61
Discharge: HOME | End: 2021-09-10
Attending: INTERNAL MEDICINE | Admitting: INTERNAL MEDICINE
Payer: COMMERCIAL

## 2021-09-10 DIAGNOSIS — I25.10 CORONARY ARTERY DISEASE INVOLVING NATIVE CORONARY ARTERY OF NATIVE HEART WITHOUT ANGINA PECTORIS: ICD-10-CM

## 2021-09-10 PROCEDURE — 93017 CV STRESS TEST TRACING ONLY: CPT

## 2021-09-10 PROCEDURE — 93016 CV STRESS TEST SUPVJ ONLY: CPT | Performed by: INTERNAL MEDICINE

## 2021-09-10 PROCEDURE — C8928 TTE W OR W/O FOL W/CON,STRES: HCPCS

## 2021-09-10 PROCEDURE — 93350 STRESS TTE ONLY: CPT | Mod: 26 | Performed by: INTERNAL MEDICINE

## 2021-09-10 PROCEDURE — 93018 CV STRESS TEST I&R ONLY: CPT | Performed by: INTERNAL MEDICINE

## 2021-09-10 PROCEDURE — 93321 DOPPLER ECHO F-UP/LMTD STD: CPT | Mod: 26 | Performed by: INTERNAL MEDICINE

## 2021-09-10 PROCEDURE — 255N000002 HC RX 255 OP 636: Performed by: INTERNAL MEDICINE

## 2021-09-10 PROCEDURE — 93325 DOPPLER ECHO COLOR FLOW MAPG: CPT | Mod: 26 | Performed by: INTERNAL MEDICINE

## 2021-09-10 RX ADMIN — HUMAN ALBUMIN MICROSPHERES AND PERFLUTREN 3 ML: 10; .22 INJECTION, SOLUTION INTRAVENOUS at 14:35

## 2021-09-10 NOTE — TELEPHONE ENCOUNTER
"Stress echo completed today, see below. OV 9/17/21 w/ Junie Matthew. Routed to providers for review.     Note 8/30/21 by Dr Park: \"I reviewed his angiogram with Nathaniel.  I called patient on the phone.  At this time I think he got an adequate result and I do not think he necessarily needs another procedure.  I recommended we could consider a stress echo in another 2 to 4 weeks to evaluate if there is any ischemia in either the LAD or circumflex distribution.  If so then we can go back to the Cath Lab for a look.  If it is okay I will just follow him up as usual        The patient exercised 13:00 min on standard Frantz protocol.  The patient did not exhibit any symptoms during exercise.  The EKG portion of this stress test was negative for inducible ischemia (see echo results below).  Normal rest wall motion with probable mild stress-induced wall motion abnormalities consistent with mild ischemia in the john-septal and apical wall(s). Clinical correlation advised.  "

## 2021-09-13 DIAGNOSIS — I25.10 CORONARY ARTERY DISEASE INVOLVING NATIVE CORONARY ARTERY OF NATIVE HEART WITHOUT ANGINA PECTORIS: ICD-10-CM

## 2021-09-13 RX ORDER — METOPROLOL SUCCINATE 25 MG/1
25 TABLET, EXTENDED RELEASE ORAL DAILY
Qty: 90 TABLET | Refills: 0 | Status: SHIPPED | OUTPATIENT
Start: 2021-09-13 | End: 2021-09-17

## 2021-09-13 RX ORDER — LOSARTAN POTASSIUM 25 MG/1
25 TABLET ORAL DAILY
Qty: 90 TABLET | Refills: 0 | Status: SHIPPED | OUTPATIENT
Start: 2021-09-13 | End: 2021-09-27

## 2021-09-15 ENCOUNTER — HOSPITAL ENCOUNTER (OUTPATIENT)
Dept: CARDIAC REHAB | Facility: CLINIC | Age: 61
End: 2021-09-15
Attending: INTERNAL MEDICINE
Payer: COMMERCIAL

## 2021-09-15 PROCEDURE — 93798 PHYS/QHP OP CAR RHAB W/ECG: CPT | Performed by: REHABILITATION PRACTITIONER

## 2021-09-17 ENCOUNTER — OFFICE VISIT (OUTPATIENT)
Dept: CARDIOLOGY | Facility: CLINIC | Age: 61
End: 2021-09-17
Attending: PHYSICIAN ASSISTANT
Payer: COMMERCIAL

## 2021-09-17 ENCOUNTER — HOSPITAL ENCOUNTER (OUTPATIENT)
Dept: CARDIAC REHAB | Facility: CLINIC | Age: 61
End: 2021-09-17
Attending: INTERNAL MEDICINE
Payer: COMMERCIAL

## 2021-09-17 VITALS
HEART RATE: 68 BPM | HEIGHT: 71 IN | OXYGEN SATURATION: 100 % | WEIGHT: 182 LBS | SYSTOLIC BLOOD PRESSURE: 136 MMHG | BODY MASS INDEX: 25.48 KG/M2 | DIASTOLIC BLOOD PRESSURE: 86 MMHG

## 2021-09-17 DIAGNOSIS — I25.10 CORONARY ARTERY DISEASE INVOLVING NATIVE CORONARY ARTERY OF NATIVE HEART WITHOUT ANGINA PECTORIS: ICD-10-CM

## 2021-09-17 PROCEDURE — 93798 PHYS/QHP OP CAR RHAB W/ECG: CPT

## 2021-09-17 PROCEDURE — 99215 OFFICE O/P EST HI 40 MIN: CPT | Performed by: PHYSICIAN ASSISTANT

## 2021-09-17 RX ORDER — METOPROLOL SUCCINATE 25 MG/1
12.5 TABLET, EXTENDED RELEASE ORAL AT BEDTIME
Qty: 90 TABLET | Refills: 0 | COMMUNITY
Start: 2021-09-17 | End: 2021-12-01

## 2021-09-17 ASSESSMENT — MIFFLIN-ST. JEOR: SCORE: 1657.68

## 2021-09-17 NOTE — PROGRESS NOTES
"  Cardiology Clinic Progress Note    Chris Barriga MRN# [id number]   YOB: 1960 Age: 61 year old   Primary cardiologist: Dr. Park         Assessment and Plan:     In summary, Chris Barriga presents today for follow up on CAD. He underwent  complex PCI of the highly tortuous LCx/OM1 bifurcation with stenting back to the LCx ostium, on 7/30/21 with Dr. Morales. With that, his angina has completely resolved. He was noted to have some under-expansion at the ostial LCX as well as mild-moderate ISR of his prior prox LAD stent, for which return to the cath lab with possible shockwave was being considered. However, after discussion between Dr. Andrew Larsen and Dr. Park, it was felt that this could be deferred and stress echocardiogram pursued to evaluate for ischemia. This showed possibly mild anteroseptal and apical ischemia at an excellent workload, no symptoms and no ischemic EKG changes. Continued medical management has been advised.     Continue aspirin, Effient, Crestor, Losartan. Reduce the Toprol from 25 to 12.5 mg nightly to see if his fatigue improves with this. If BP becomes uncontrolled with this, will increase Losartan. Will continue to monitor symptoms through cardiac rehab and exercise at home. Will plan for a repeat stress echo down the line.     Follow up scheduled with Dr. Park in November, with a lipid panel / ALT prior.         History of Presenting Illness:      Chris Barriga is a pleasant 61 year old patient who presents today for follow up on CAD.     He has a history of CAD, NSTEMI in 2012, s/p PCI to prox LAD. Since that time, he has been described as a \"model patient,\" compliant with routine rigorous exercise, a heart-healthy diet, and medications.    He was doing incredibly well, until he developed exertional angina in July of this year, primarily with bike rides. A stress echo was ordered. This took place on 7/23 and showed distal anterolateral " ischemia in a diagonal / OM distribution post stress with chest discomfort during the test (at peak exercise). He exercised 15 METs without ischemic changes on EKG. BP response to exercise was normal. Amlodipine was started and an office visit was arranged.  When I met Chava to review his stress echo on July 27th, he relayed an episode of rest pain the night prior. I directed him to the ED for admission and angiogram. Troponins were negative. Angiogram took place the following day. This showed that his previously placed ostial to proximal LAD stent was widely patent.  He had a culprit 95% OM1 lesion which was eccentric and thrombotic.  Due to significant tortuosity Dr. Martin was unable to cross the lesion.  Therefore, medical management was recommended for the time being.  Imdur was initiated, and Losartan was discontinued. He was also switched from Lipitor to Crestor 40, with an LDL already in the 20's.     When I saw him again on July 30th, he continued to experience intermittent chest pain, which was present to a mild degree in clinic. After discussion with Dr. Morales, he was taken back to the cath lab, where he had an extensive several hour procedure resulting in successful, HD IVUS guided PCI of the highly tortuous LCx/OM1 bifurcation with stenting back to the LCx ostium.  There was mild-moderate underexpansion of the ostial portion of the LCx stent due to significant underlying calcium burden. There was also noted to be moderate in-stent restenosis of the prior proximal LAD stent. He discharged home the following day with aspirin and Effient. It was recommended he return to the cath lab in 4-6 weeks for FFR of the proximal LAD and possible shockwave of the proximal LCx stent for improved expansion. However, Chava had complete resolution in his symptoms with the initial PCI, and after discussion between Chava, Dr. Morales and Dr. Park, it was felt that return to the lab could be deferred and stress  echocardiogram pursued to evaluate for ischemia.    Stress echo took place on 9/10. He exercise 15 METs without symptoms or ischemic EKG changes. Echo showed Normal rest wall motion with probable mild stress-induced wall motion abnormalities consistent with mild ischemia in the john-septal and apical walls.  Medical management was advised by Dr. Park.     He saw Dr. Brett Hightower for a second opinion on 9/14:  1. I reviewed the angiographic interventional findings with the patient. Essentially she had a successful procedure and a very complex anatomy and heavy calcifications with angulations that prohibited or rotational atherectomy. The residual stenosis although present is not severe and is mild to moderate. There is plaque shifting in the left anterior descending artery of about 50 to 60% that was not treated. His stress echocardiogram read stratifies him to a low risk subset which should do well on medical therapy.  2. Given the plaque shifting left anterior descending artery I would recommend a CT coronary angiogram to reevaluate this bifurcation to ensure that there is noncritical disease or plaque shifting given his mildly abnormal stress echocardiogram as he is very active and wants to continue to participate in biking and other exertional activities. If the stenosis in the left anterior sending artery and left circumflex study are not severe then medical therapy will be recommended.  3. I recommend a stress echocardiogram in 6 months time given the high risk of restenosis in these underexpanded areas.    Today, Chava returns to clinic stating he continues to feel well from a cardiovascular standpoint, without recurrent chest pain. He did feel pretty wiped out the day after the stress test. Also notes intermittent fatigue, which he wonders if it's related to the metoprolol we started last month. Notes that he can get more of an awareness of his chest on days he feels tired, but again no ronel chest pain.  "Similar to how he felt immediately post-PCI 9 years ago. He denies shortness of breath, PND, orthopnea, edema. He's been compliant with his medications. Started CoQ 10 yesterday due to some knee pain that he thinks may be related to his recent statin increase. He's continued to participate in cardiac rehab. We reviewed his stress echo results. BP's at home and in rehab have been controlled.          Review of Systems:     12-pt ROS is negative except for as noted in the HPI.          Physical Exam:     Vitals: BP (!) 146/90 (BP Location: Right arm, Cuff Size: Adult Regular)   Pulse 68   Ht 1.803 m (5' 11\")   Wt 82.6 kg (182 lb)   SpO2 100%   BMI 25.38 kg/m    Wt Readings from Last 10 Encounters:   09/17/21 82.6 kg (182 lb)   08/18/21 81.6 kg (180 lb)   07/31/21 82.1 kg (181 lb)   07/30/21 81.2 kg (179 lb)   07/28/21 79.5 kg (175 lb 4.8 oz)   05/29/19 82.6 kg (182 lb 3.2 oz)   11/13/18 80.2 kg (176 lb 12.8 oz)   10/06/17 81.6 kg (179 lb 12.8 oz)   09/06/17 81 kg (178 lb 8 oz)   05/22/17 77.1 kg (170 lb)       Constitutional:  Patient is pleasant, alert, cooperative, and in NAD.  HEENT:  NCAT. PERRLA. EOM's intact.   Neck:  CVP appears normal. No carotid bruits.   Pulmonary: Normal respiratory effort. CTAB.   Cardiac: RRR, normal S1/S2, no S3/S4, no murmur or rub.   Abdomen:  Non-tender abdomen, no hepatosplenomegaly appreciated.   Vascular: Pulses in the upper and lower extremities are 2+ and equal bilaterally.  Extremities: No edema, erythema, cyanosis or tenderness appreciated.  Skin:  No rashes or lesions appreciated.   Neurological:  No gross motor or sensory deficits.   Psych: Appropriate affect.        Data:     Labs reviewed:  Recent Labs   Lab Test 07/31/21  0003 07/28/21  0431 01/21/21  0824   LDL 26 50 35   HDL 66 92 91   NHDL 39 63 56   CHOL 105 155 147   TRIG 64 67 107       Lab Results   Component Value Date    WBC 5.9 07/30/2021    WBC 5.5 08/21/2017    RBC 4.37 (L) 07/30/2021    RBC 4.81 " 08/21/2017    HGB 14.4 07/30/2021    HGB 15.4 08/21/2017    HCT 41.8 07/30/2021    HCT 44.3 08/21/2017    MCV 96 07/30/2021    MCV 92 08/21/2017    MCH 33.0 07/30/2021    MCH 32 08/21/2017    MCHC 34.4 07/30/2021    MCHC 34.8 08/21/2017    RDW 13.2 07/30/2021    RDW 13 08/21/2017     07/30/2021     08/21/2017       Lab Results   Component Value Date     07/31/2021     01/21/2021    POTASSIUM 4.0 07/31/2021    POTASSIUM 4.2 01/21/2021    CHLORIDE 110 (H) 07/31/2021    CHLORIDE 106 01/21/2021    CO2 28 07/31/2021    CO2 30 01/21/2021    ANIONGAP <1 (L) 07/31/2021    ANIONGAP 2 (L) 01/21/2021    GLC 95 07/31/2021    GLC 97 01/21/2021    BUN 23 07/31/2021    BUN 20 01/21/2021    CR 0.88 07/31/2021    CR 0.90 01/21/2021    GFRESTIMATED >90 07/31/2021    GFRESTIMATED >90 01/21/2021    GFRESTBLACK >90 01/21/2021    DANY 8.0 (L) 07/31/2021    DANY 8.9 01/21/2021      Lab Results   Component Value Date    AST 21 09/04/2018    ALT 24 09/17/2020       Lab Results   Component Value Date    A1C 5.0 07/28/2021       Lab Results   Component Value Date    INR 0.97 07/30/2021    INR 0.89 05/25/2012           Problem List:     Patient Active Problem List   Diagnosis     Pain in joint, lower leg     Other postprocedural status(V45.89)     Iliac artery aneurysm, left (H)     Coronary artery disease involving native coronary artery of native heart without angina pectoris     Benign essential hypertension     Pure hypercholesterolemia     Aortic root dilation (H)     Ischemic cardiomyopathy     Chest pain     Abnormal cardiovascular stress test     Hx of cardiac catheterization     Abnormal findings on diagnostic imaging of heart and coronary circulation     Percutaneous transluminal coronary angioplasty status           Medications:     Current Outpatient Medications   Medication Sig Dispense Refill     acetaminophen (TYLENOL) 325 MG tablet Take 2 tablets (650 mg) by mouth every 4 hours as needed for mild pain or  headaches       ASPIRIN EC PO Take 81 mg by mouth daily       Flaxseed, Linseed, (FLAX SEED OIL) 1000 MG capsule Take 1 capsule by mouth daily       glucosamine-chondroitin 500-400 MG CAPS Take 2 capsules by mouth daily        hydrocortisone (WESTCORT) 0.2 % cream Apply topically 2 times daily as needed        loratadine (CLARITIN) 10 MG capsule Take 10 mg by mouth daily       losartan (COZAAR) 25 MG tablet Take 1 tablet (25 mg) by mouth daily 90 tablet 0     Lysine 500 MG TABS Take 1 tablet by mouth daily.       magnesium 500 MG TABS Take 500 mg by mouth every evening       metoprolol succinate ER (TOPROL XL) 25 MG 24 hr tablet Take 1 tablet (25 mg) by mouth daily 90 tablet 0     MINOCYCLINE HCL PO Take 100 mg by mouth daily as needed (acne)        Multiple Vitamin (MULTI-VITAMIN) per tablet Take 1 tablet by mouth daily.       nitroGLYcerin (NITROSTAT) 0.4 MG sublingual tablet Place 1 tablet (0.4 mg) under the tongue every 5 minutes as needed for chest pain Administer every 5 minutes as needed.  Maximum 3 doses in 15 minutes. 30 tablet 0     prasugrel (EFFIENT) 10 MG TABS tablet Take 1 tablet (10 mg) by mouth daily 90 tablet 3     rosuvastatin (CRESTOR) 40 MG tablet Take 1 tablet (40 mg) by mouth daily 90 tablet 1           Past Medical History:     Past Medical History:   Diagnosis Date     Adenoma of ascending colon      Aortic root dilation (H)      Chest pain      Coronary artery disease     5/2012: Stent to proximal LAD for unstable angina. Proximal left circumflex 30-40%. EF 60%     HTN (hypertension)      Hyperlipidaemia      Iliac artery aneurysm, left (H)      Past Surgical History:   Procedure Laterality Date     APPENDECTOMY OPEN       ARTHROSCOPY KNEE BILATERAL      both knees     ARTHROSCOPY SHOULDER      left     C ORAL SURGERY PROCEDURE       CARDIAC SURGERY      stent x 1     COLONOSCOPY N/A 2/17/2017    Procedure: COLONOSCOPY;  Surgeon: Junie Grier MD;  Location: [facility] OR     COLONOSCOPY   12/2016    [facility] [place]     COLONOSCOPY  05/22/2017    Dr. Grier [facility]     COLONOSCOPY N/A 5/29/2018    Procedure: COLONOSCOPY;  colonoscopy (crsal);  Surgeon: Junie Grier MD;  Location:  GI     CV HEART CATHETERIZATION WITH POSSIBLE INTERVENTION N/A 7/28/2021    Procedure: Heart Catheterization with Possible Intervention;  Surgeon: Buddy Martin MD;  Location:  HEART CARDIAC CATH LAB     CV INTRAVASULAR ULTRASOUND N/A 7/30/2021    Procedure: Intravascular Ultrasound;  Surgeon: Mitchell Morales MD;  Location:  HEART CARDIAC CATH LAB     CV PCI STENT DRUG ELUTING N/A 7/30/2021    Procedure: Percutaneous Coronary Intervention Stent Drug Eluting;  Surgeon: Mitchell Morales MD;  Location:  HEART CARDIAC CATH LAB     LAPAROSCOPIC HERNIORRHAPHY INGUINAL  12/30/2013    Procedure: LAPAROSCOPIC HERNIORRHAPHY INGUINAL;  LAPAROSCOPIC RIGHT INGUINAL HERNIA REPAIR WITH MESH;  Surgeon: Reji Pizano MD;  Location: [facility]     ORTHOPEDIC SURGERY      scope of both shoulders     VASECTOMY       Family History   Problem Relation Age of Onset     Breast Cancer Mother      Cerebrovascular Disease Father      Breast Cancer Maternal Grandmother      Breast Cancer Son      Colon Cancer No family hx of      Social History     Socioeconomic History     Marital status:      Spouse name: Not on file     Number of children: Not on file     Years of education: Not on file     Highest education level: Not on file   Occupational History     Not on file   Tobacco Use     Smoking status: Never Smoker     Smokeless tobacco: Never Used   Vaping Use     Vaping Use: Never used   Substance and Sexual Activity     Alcohol use: Yes     Comment: 2-3 per week     Drug use: No     Sexual activity: Not on file   Other Topics Concern     Parent/sibling w/ CABG, MI or angioplasty before 65F 55M? Not Asked   Social History Narrative     Not on file     Social Determinants of Health     Financial  Resource Strain:      Difficulty of Paying Living Expenses:    Food Insecurity:      Worried About Running Out of Food in the Last Year:      Ran Out of Food in the Last Year:    Transportation Needs:      Lack of Transportation (Medical):      Lack of Transportation (Non-Medical):    Physical Activity:      Days of Exercise per Week:      Minutes of Exercise per Session:    Stress:      Feeling of Stress :    Social Connections:      Frequency of Communication with Friends and Family:      Frequency of Social Gatherings with Friends and Family:      Attends Confucianist Services:      Active Member of Clubs or Organizations:      Attends Club or Organization Meetings:      Marital Status:    Intimate Partner Violence:      Fear of Current or Ex-Partner:      Emotionally Abused:      Physically Abused:      Sexually Abused:            Allergies:   Seasonal allergies, Hydrocodone-acetaminophen, and Molds & smuts      Junie Matthew PA-C  [facility]  Pager: [phone]

## 2021-09-17 NOTE — PATIENT INSTRUCTIONS
Today's Plan:   - Reduce the metoprolol to 12.5 mg nightly (1/2 tab) to see if this helps with your fatigue. If your BP starts to sit persistently >130/80 mmHg with this change, let me know, so we can increase the Losartan.   - Continue to monitor your chest symptoms and let me know if they change.   - Return to see Dr. Park in November, as scheduled.     If you have questions or concerns please call my nurse team at [phone].  Scheduling phone number: [phone]  For after hours urgent concerns call [phone] option 2.   Reminder: Please bring in all current medications, over the counter supplements and vitamin bottles to your next appointment.    It was a pleasure seeing you today!     Junie Matthew PA-C

## 2021-09-17 NOTE — LETTER
"9/17/2021    Bharathi Chowdary MD  [address]  [facility] [address]    RE: Chris Barriga       Dear Colleague,    I had the pleasure of seeing Chris Barriga in the [facility].      Cardiology Clinic Progress Note    Chris Barriga MRN# [id number]   YOB: 1960 Age: 61 year old   Primary cardiologist: Dr. Park         Assessment and Plan:     In summary, Chris Barriga presents today for follow up on CAD. He underwent  complex PCI of the highly tortuous LCx/OM1 bifurcation with stenting back to the LCx ostium, on 7/30/21 with Dr. Morales. With that, his angina has completely resolved. He was noted to have some under-expansion at the ostial LCX as well as mild-moderate ISR of his prior prox LAD stent, for which return to the cath lab with possible shockwave was being considered. However, after discussion between Chava, Dr. Morales and Dr. Park, it was felt that this could be deferred and stress echocardiogram pursued to evaluate for ischemia. This showed possibly mild anteroseptal and apical ischemia at an excellent workload, no symptoms and no ischemic EKG changes. Continued medical management has been advised.     Continue aspirin, Effient, Crestor, Losartan. Reduce the Toprol from 25 to 12.5 mg nightly to see if his fatigue improves with this. If BP becomes uncontrolled with this, will increase Losartan. Will continue to monitor symptoms through cardiac rehab and exercise at home. Will plan for a repeat stress echo down the line.     Follow up scheduled with Dr. Park in November, with a lipid panel / ALT prior.         History of Presenting Illness:      Chris Barriga is a pleasant 61 year old patient who presents today for follow up on CAD.     He has a history of CAD, NSTEMI in 2012, s/p PCI to prox LAD. Since that time, he has been described as a \"model patient,\" compliant with routine " rigorous exercise, a heart-healthy diet, and medications.    He was doing incredibly well, until he developed exertional angina in July of this year, primarily with bike rides. A stress echo was ordered. This took place on 7/23 and showed distal anterolateral ischemia in a diagonal / OM distribution post stress with chest discomfort during the test (at peak exercise). He exercised 15 METs without ischemic changes on EKG. BP response to exercise was normal. Amlodipine was started and an office visit was arranged.  When I met Chava to review his stress echo on July 27th, he relayed an episode of rest pain the night prior. I directed him to the ED for admission and angiogram. Troponins were negative. Angiogram took place the following day. This showed that his previously placed ostial to proximal LAD stent was widely patent.  He had a culprit 95% OM1 lesion which was eccentric and thrombotic.  Due to significant tortuosity Dr. Martin was unable to cross the lesion.  Therefore, medical management was recommended for the time being.  Imdur was initiated, and Losartan was discontinued. He was also switched from Lipitor to Crestor 40, with an LDL already in the 20's.     When I saw him again on July 30th, he continued to experience intermittent chest pain, which was present to a mild degree in clinic. After discussion with Dr. Morales, he was taken back to the cath lab, where he had an extensive several hour procedure resulting in successful, HD IVUS guided PCI of the highly tortuous LCx/OM1 bifurcation with stenting back to the LCx ostium.  There was mild-moderate underexpansion of the ostial portion of the LCx stent due to significant underlying calcium burden. There was also noted to be moderate in-stent restenosis of the prior proximal LAD stent. He discharged home the following day with aspirin and Effient. It was recommended he return to the cath lab in 4-6 weeks for FFR of the proximal LAD and possible shockwave of  the proximal LCx stent for improved expansion. However, Chava had complete resolution in his symptoms with the initial PCI, and after discussion between Chava, Dr. Morales and Dr. Park, it was felt that return to the lab could be deferred and stress echocardiogram pursued to evaluate for ischemia.    Stress echo took place on 9/10. He exercise 15 METs without symptoms or ischemic EKG changes. Echo showed Normal rest wall motion with probable mild stress-induced wall motion abnormalities consistent with mild ischemia in the john-septal and apical walls.  Medical management was advised by Dr. Park.     He saw Dr. Brett Hightower for a second opinion on 9/14:  1. I reviewed the angiographic interventional findings with the patient. Essentially she had a successful procedure and a very complex anatomy and heavy calcifications with angulations that prohibited or rotational atherectomy. The residual stenosis although present is not severe and is mild to moderate. There is plaque shifting in the left anterior descending artery of about 50 to 60% that was not treated. His stress echocardiogram read stratifies him to a low risk subset which should do well on medical therapy.  2. Given the plaque shifting left anterior descending artery I would recommend a CT coronary angiogram to reevaluate this bifurcation to ensure that there is noncritical disease or plaque shifting given his mildly abnormal stress echocardiogram as he is very active and wants to continue to participate in biking and other exertional activities. If the stenosis in the left anterior sending artery and left circumflex study are not severe then medical therapy will be recommended.  3. I recommend a stress echocardiogram in 6 months time given the high risk of restenosis in these underexpanded areas.    Today, Chava returns to clinic stating he continues to feel well from a cardiovascular standpoint, without recurrent chest pain. He did feel pretty wiped out  "the day after the stress test. Also notes intermittent fatigue, which he wonders if it's related to the metoprolol we started last month. Notes that he can get more of an awareness of his chest on days he feels tired, but again no ronel chest pain. Similar to how he felt immediately post-PCI 9 years ago. He denies shortness of breath, PND, orthopnea, edema. He's been compliant with his medications. Started CoQ 10 yesterday due to some knee pain that he thinks may be related to his recent statin increase. He's continued to participate in cardiac rehab. We reviewed his stress echo results. BP's at home and in rehab have been controlled.          Review of Systems:     12-pt ROS is negative except for as noted in the HPI.          Physical Exam:     Vitals: BP (!) 146/90 (BP Location: Right arm, Cuff Size: Adult Regular)   Pulse 68   Ht 1.803 m (5' 11\")   Wt 82.6 kg (182 lb)   SpO2 100%   BMI 25.38 kg/m    Wt Readings from Last 10 Encounters:   09/17/21 82.6 kg (182 lb)   08/18/21 81.6 kg (180 lb)   07/31/21 82.1 kg (181 lb)   07/30/21 81.2 kg (179 lb)   07/28/21 79.5 kg (175 lb 4.8 oz)   05/29/19 82.6 kg (182 lb 3.2 oz)   11/13/18 80.2 kg (176 lb 12.8 oz)   10/06/17 81.6 kg (179 lb 12.8 oz)   09/06/17 81 kg (178 lb 8 oz)   05/22/17 77.1 kg (170 lb)       Constitutional:  Patient is pleasant, alert, cooperative, and in NAD.  HEENT:  NCAT. PERRLA. EOM's intact.   Neck:  CVP appears normal. No carotid bruits.   Pulmonary: Normal respiratory effort. CTAB.   Cardiac: RRR, normal S1/S2, no S3/S4, no murmur or rub.   Abdomen:  Non-tender abdomen, no hepatosplenomegaly appreciated.   Vascular: Pulses in the upper and lower extremities are 2+ and equal bilaterally.  Extremities: No edema, erythema, cyanosis or tenderness appreciated.  Skin:  No rashes or lesions appreciated.   Neurological:  No gross motor or sensory deficits.   Psych: Appropriate affect.        Data:     Labs reviewed:  Recent Labs   Lab Test " 07/31/21  0003 07/28/21  0431 01/21/21  0824   LDL 26 50 35   HDL 66 92 91   NHDL 39 63 56   CHOL 105 155 147   TRIG 64 67 107       Lab Results   Component Value Date    WBC 5.9 07/30/2021    WBC 5.5 08/21/2017    RBC 4.37 (L) 07/30/2021    RBC 4.81 08/21/2017    HGB 14.4 07/30/2021    HGB 15.4 08/21/2017    HCT 41.8 07/30/2021    HCT 44.3 08/21/2017    MCV 96 07/30/2021    MCV 92 08/21/2017    MCH 33.0 07/30/2021    MCH 32 08/21/2017    MCHC 34.4 07/30/2021    MCHC 34.8 08/21/2017    RDW 13.2 07/30/2021    RDW 13 08/21/2017     07/30/2021     08/21/2017       Lab Results   Component Value Date     07/31/2021     01/21/2021    POTASSIUM 4.0 07/31/2021    POTASSIUM 4.2 01/21/2021    CHLORIDE 110 (H) 07/31/2021    CHLORIDE 106 01/21/2021    CO2 28 07/31/2021    CO2 30 01/21/2021    ANIONGAP <1 (L) 07/31/2021    ANIONGAP 2 (L) 01/21/2021    GLC 95 07/31/2021    GLC 97 01/21/2021    BUN 23 07/31/2021    BUN 20 01/21/2021    CR 0.88 07/31/2021    CR 0.90 01/21/2021    GFRESTIMATED >90 07/31/2021    GFRESTIMATED >90 01/21/2021    GFRESTBLACK >90 01/21/2021    DANY 8.0 (L) 07/31/2021    DANY 8.9 01/21/2021      Lab Results   Component Value Date    AST 21 09/04/2018    ALT 24 09/17/2020       Lab Results   Component Value Date    A1C 5.0 07/28/2021       Lab Results   Component Value Date    INR 0.97 07/30/2021    INR 0.89 05/25/2012           Problem List:     Patient Active Problem List   Diagnosis     Pain in joint, lower leg     Other postprocedural status(V45.89)     Iliac artery aneurysm, left (H)     Coronary artery disease involving native coronary artery of native heart without angina pectoris     Benign essential hypertension     Pure hypercholesterolemia     Aortic root dilation (H)     Ischemic cardiomyopathy     Chest pain     Abnormal cardiovascular stress test     Hx of cardiac catheterization     Abnormal findings on diagnostic imaging of heart and coronary circulation      Percutaneous transluminal coronary angioplasty status           Medications:     Current Outpatient Medications   Medication Sig Dispense Refill     acetaminophen (TYLENOL) 325 MG tablet Take 2 tablets (650 mg) by mouth every 4 hours as needed for mild pain or headaches       ASPIRIN EC PO Take 81 mg by mouth daily       Flaxseed, Linseed, (FLAX SEED OIL) 1000 MG capsule Take 1 capsule by mouth daily       glucosamine-chondroitin 500-400 MG CAPS Take 2 capsules by mouth daily        hydrocortisone (WESTCORT) 0.2 % cream Apply topically 2 times daily as needed        loratadine (CLARITIN) 10 MG capsule Take 10 mg by mouth daily       losartan (COZAAR) 25 MG tablet Take 1 tablet (25 mg) by mouth daily 90 tablet 0     Lysine 500 MG TABS Take 1 tablet by mouth daily.       magnesium 500 MG TABS Take 500 mg by mouth every evening       metoprolol succinate ER (TOPROL XL) 25 MG 24 hr tablet Take 1 tablet (25 mg) by mouth daily 90 tablet 0     MINOCYCLINE HCL PO Take 100 mg by mouth daily as needed (acne)        Multiple Vitamin (MULTI-VITAMIN) per tablet Take 1 tablet by mouth daily.       nitroGLYcerin (NITROSTAT) 0.4 MG sublingual tablet Place 1 tablet (0.4 mg) under the tongue every 5 minutes as needed for chest pain Administer every 5 minutes as needed.  Maximum 3 doses in 15 minutes. 30 tablet 0     prasugrel (EFFIENT) 10 MG TABS tablet Take 1 tablet (10 mg) by mouth daily 90 tablet 3     rosuvastatin (CRESTOR) 40 MG tablet Take 1 tablet (40 mg) by mouth daily 90 tablet 1           Past Medical History:     Past Medical History:   Diagnosis Date     Adenoma of ascending colon      Aortic root dilation (H)      Chest pain      Coronary artery disease     5/2012: Stent to proximal LAD for unstable angina. Proximal left circumflex 30-40%. EF 60%     HTN (hypertension)      Hyperlipidaemia      Iliac artery aneurysm, left (H)      Past Surgical History:   Procedure Laterality Date     APPENDECTOMY OPEN       ARTHROSCOPY  KNEE BILATERAL      both knees     ARTHROSCOPY SHOULDER      left     C ORAL SURGERY PROCEDURE       CARDIAC SURGERY      stent x 1     COLONOSCOPY N/A 2/17/2017    Procedure: COLONOSCOPY;  Surgeon: Junie Grier MD;  Location:  OR     COLONOSCOPY  12/2016    [facility] [place]     COLONOSCOPY  05/22/2017    [facility]     COLONOSCOPY N/A 5/29/2018    Procedure: COLONOSCOPY;  colonoscopy (crsal);  Surgeon: Junie Grier MD;  Location:  GI     CV HEART CATHETERIZATION WITH POSSIBLE INTERVENTION N/A 7/28/2021    Procedure: Heart Catheterization with Possible Intervention;  Surgeon: Buddy Martin MD;  Location:  HEART CARDIAC CATH LAB     CV INTRAVASULAR ULTRASOUND N/A 7/30/2021    Procedure: Intravascular Ultrasound;  Surgeon: Mitchell Morales MD;  Location:  HEART CARDIAC CATH LAB     CV PCI STENT DRUG ELUTING N/A 7/30/2021    Procedure: Percutaneous Coronary Intervention Stent Drug Eluting;  Surgeon: Mitchell Morales MD;  Location:  HEART CARDIAC CATH LAB     LAPAROSCOPIC HERNIORRHAPHY INGUINAL  12/30/2013    Procedure: LAPAROSCOPIC HERNIORRHAPHY INGUINAL;  LAPAROSCOPIC RIGHT INGUINAL HERNIA REPAIR WITH MESH;  Surgeon: Reji Pizano MD;  Location: [facility]     ORTHOPEDIC SURGERY      scope of both shoulders     VASECTOMY       Family History   Problem Relation Age of Onset     Breast Cancer Mother      Cerebrovascular Disease Father      Breast Cancer Maternal Grandmother      Breast Cancer Son      Colon Cancer No family hx of      Social History     Socioeconomic History     Marital status:      Spouse name: Not on file     Number of children: Not on file     Years of education: Not on file     Highest education level: Not on file   Occupational History     Not on file   Tobacco Use     Smoking status: Never Smoker     Smokeless tobacco: Never Used   Vaping Use     Vaping Use: Never used   Substance and Sexual Activity     Alcohol use: Yes      Comment: 2-3 per week     Drug use: No     Sexual activity: Not on file   Other Topics Concern     Parent/sibling w/ CABG, MI or angioplasty before 65F 55M? Not Asked   Social History Narrative     Not on file     Social Determinants of Health     Financial Resource Strain:      Difficulty of Paying Living Expenses:    Food Insecurity:      Worried About Running Out of Food in the Last Year:      Ran Out of Food in the Last Year:    Transportation Needs:      Lack of Transportation (Medical):      Lack of Transportation (Non-Medical):    Physical Activity:      Days of Exercise per Week:      Minutes of Exercise per Session:    Stress:      Feeling of Stress :    Social Connections:      Frequency of Communication with Friends and Family:      Frequency of Social Gatherings with Friends and Family:      Attends Mandaeism Services:      Active Member of Clubs or Organizations:      Attends Club or Organization Meetings:      Marital Status:    Intimate Partner Violence:      Fear of Current or Ex-Partner:      Emotionally Abused:      Physically Abused:      Sexually Abused:            Allergies:   Seasonal allergies, Hydrocodone-acetaminophen, and Molds & smuts      HIPOLITO Eisenberg [facility]  Pager: [phone]        Thank you for allowing me to participate in the care of your patient.      Sincerely,     HIPOLITO Eisenberg [facility]  cc:   Junie Matthew PA-C  [address] [address]

## 2021-09-20 ENCOUNTER — HOSPITAL ENCOUNTER (OUTPATIENT)
Dept: CARDIAC REHAB | Facility: CLINIC | Age: 61
End: 2021-09-20
Attending: INTERNAL MEDICINE
Payer: COMMERCIAL

## 2021-09-20 PROCEDURE — 93798 PHYS/QHP OP CAR RHAB W/ECG: CPT

## 2021-09-22 ENCOUNTER — HOSPITAL ENCOUNTER (OUTPATIENT)
Dept: CARDIAC REHAB | Facility: CLINIC | Age: 61
End: 2021-09-22
Attending: INTERNAL MEDICINE
Payer: COMMERCIAL

## 2021-09-22 PROCEDURE — 93798 PHYS/QHP OP CAR RHAB W/ECG: CPT

## 2021-09-25 ENCOUNTER — HEALTH MAINTENANCE LETTER (OUTPATIENT)
Age: 61
End: 2021-09-25

## 2021-09-27 ENCOUNTER — HOSPITAL ENCOUNTER (OUTPATIENT)
Dept: CARDIAC REHAB | Facility: CLINIC | Age: 61
End: 2021-09-27
Attending: INTERNAL MEDICINE
Payer: COMMERCIAL

## 2021-09-27 PROCEDURE — 93798 PHYS/QHP OP CAR RHAB W/ECG: CPT | Performed by: REHABILITATION PRACTITIONER

## 2021-09-29 ENCOUNTER — HOSPITAL ENCOUNTER (OUTPATIENT)
Dept: CARDIAC REHAB | Facility: CLINIC | Age: 61
End: 2021-09-29
Attending: INTERNAL MEDICINE
Payer: COMMERCIAL

## 2021-09-29 PROCEDURE — 93798 PHYS/QHP OP CAR RHAB W/ECG: CPT | Mod: XU | Performed by: REHABILITATION PRACTITIONER

## 2021-09-29 PROCEDURE — 999N000108 HC STATISTIC OP CARDIAC VISIT #2: Performed by: REHABILITATION PRACTITIONER

## 2021-09-29 NOTE — PROGRESS NOTES
NUTRITION ASSESSMENT & EDUCATION NOTE    REASON FOR ASSESSMENT  Chris Barriga is a 61 year old male seen by Registered Dietitian for 1:1 Cardiac Rehab consult     NUTRITION HISTORY    Information obtained from patient    Patient has been instructed to follow a heart healthy, 2g sodium    Previous diet education: after first heart event --> 1:1 with RD     Patient has attended Nutrition 2, and others during past admit     Nutrition-related goals: reinforcing what he is already doing and wanting new ideas     Grocery shopping, cooking: both spouse and patient     Frequency of eating out: 2 x per week     Typical intake as follows:   - Breakfast: cup of coffee, small bowl (bran and cereal - cherrios, crispex), OR yogurt fruit flavor (low fat, low sugar - chobani OR Singaporean skyr) with granola    - Lunch: sandwich (turkey, cheese, avocado OR mustard  - never parsons) and yogurt OR salad (balsamic, stir leslie vegetables - peppers, carrots, asparagus, mushrooms) and yogurt OR leftovers    - Dinner: fish or chicken with vegetables (asparagus, brussels sprouts, salad, sauteed vegetables, sweet potatoes), rice, fruit    - Snacks: 1 scoop of ice cream once a week    - Fluids: coffee, water (16-20 oz 3 x per day), sometimes a glass of juice       Changes to diet since cardiac event: paying more attention to sodium content, no sodium added in years, fruits and vegetables daily, cut almost all red meat out of the diet, changing lunch habits (previously sandwiches), eating more salads instead, more fish (2x per week), lean proteins the other days and cut back on added sugar. Since first event, adding flax seed, fiber supplements, cut back on dairy --> previously drank 2-3 glasses per day now more like 2-3 glasses per week.     Barriers to dietary changes: none     NUTRITION DIAGNOSIS  Food- and nutrition- related knowledge deficit related to the heart healthy diet as evidenced by pt with limited previous formal education on  the aforementioned diet     INTERVENTIONS  Nutrition Prescription:  Cardiac diet:   Low saturated fat, Na <2300 mg  Fiber >25 g per day  Emphasis on plate method for balanced meals     Implementation    Assessed learning needs and learning preference.    Nutrition Education (Content):  a) Provided handouts:  a. Nutrition 2 slide show  b. Heart Healthy Nutrition Therapy via the nutrition care manual   b) Discussed heart healthy diet recommendations, including label reading, minimizing added salts/saturated fats/sugars, balanced meals TID, heart healthy substitutes, eliminating all trans fat, sugar free beverages    Nutrition Education (Application):  a) Discussed eating habits and recommended alternative food choices:  a. Emphasized fruits, vegetables, low sodium nuts/heart healthy fats, fish, low fat dairy  b. Reviewed recipes and new food ideas such as beans, lentils, vegetables and tofu for an increase in plant based options     Goals/Follow up/Monitoring For Cardiac Rehab Staff    Adherence to nutrition related recommendations, follow with cardiac rehab    Pt to include 5-6 servings of fruit and vegetables daily into the diet.     Additional questions/concerns related to heart healthy guidelines    Josee Lazo RD, LD  Clinical Dietitian   [facility]  Office: [phone]    DIRECT PATIENT CARE TIME: 45 MINUTES

## 2021-10-01 ENCOUNTER — HOSPITAL ENCOUNTER (OUTPATIENT)
Dept: CARDIAC REHAB | Facility: CLINIC | Age: 61
End: 2021-10-01
Attending: INTERNAL MEDICINE
Payer: COMMERCIAL

## 2021-10-01 PROCEDURE — 93798 PHYS/QHP OP CAR RHAB W/ECG: CPT

## 2021-10-04 ENCOUNTER — HOSPITAL ENCOUNTER (OUTPATIENT)
Dept: CARDIAC REHAB | Facility: CLINIC | Age: 61
End: 2021-10-04
Attending: INTERNAL MEDICINE
Payer: COMMERCIAL

## 2021-10-04 PROCEDURE — 93798 PHYS/QHP OP CAR RHAB W/ECG: CPT

## 2021-10-06 ENCOUNTER — HOSPITAL ENCOUNTER (OUTPATIENT)
Dept: CARDIAC REHAB | Facility: CLINIC | Age: 61
End: 2021-10-06
Attending: INTERNAL MEDICINE
Payer: COMMERCIAL

## 2021-10-06 PROCEDURE — 93797 PHYS/QHP OP CAR RHAB WO ECG: CPT | Performed by: REHABILITATION PRACTITIONER

## 2021-10-06 PROCEDURE — 93798 PHYS/QHP OP CAR RHAB W/ECG: CPT

## 2021-10-08 ENCOUNTER — HOSPITAL ENCOUNTER (OUTPATIENT)
Dept: CARDIAC REHAB | Facility: CLINIC | Age: 61
End: 2021-10-08
Attending: INTERNAL MEDICINE
Payer: COMMERCIAL

## 2021-10-08 PROCEDURE — 93798 PHYS/QHP OP CAR RHAB W/ECG: CPT

## 2021-10-13 ENCOUNTER — HOSPITAL ENCOUNTER (OUTPATIENT)
Dept: CARDIAC REHAB | Facility: CLINIC | Age: 61
End: 2021-10-13
Attending: INTERNAL MEDICINE
Payer: COMMERCIAL

## 2021-10-13 ENCOUNTER — LAB (OUTPATIENT)
Dept: LAB | Facility: CLINIC | Age: 61
End: 2021-10-13
Payer: COMMERCIAL

## 2021-10-13 DIAGNOSIS — I25.10 CORONARY ARTERY DISEASE INVOLVING NATIVE CORONARY ARTERY OF NATIVE HEART WITHOUT ANGINA PECTORIS: ICD-10-CM

## 2021-10-13 LAB
ANION GAP SERPL CALCULATED.3IONS-SCNC: 3 MMOL/L (ref 3–14)
BUN SERPL-MCNC: 18 MG/DL (ref 7–30)
CALCIUM SERPL-MCNC: 8.6 MG/DL (ref 8.5–10.1)
CHLORIDE BLD-SCNC: 109 MMOL/L (ref 94–109)
CO2 SERPL-SCNC: 29 MMOL/L (ref 20–32)
CREAT SERPL-MCNC: 0.77 MG/DL (ref 0.66–1.25)
GFR SERPL CREATININE-BSD FRML MDRD: >90 ML/MIN/1.73M2
GLUCOSE BLD-MCNC: 92 MG/DL (ref 70–99)
POTASSIUM BLD-SCNC: 3.9 MMOL/L (ref 3.4–5.3)
SODIUM SERPL-SCNC: 141 MMOL/L (ref 133–144)

## 2021-10-13 PROCEDURE — 80048 BASIC METABOLIC PNL TOTAL CA: CPT | Performed by: PHYSICIAN ASSISTANT

## 2021-10-13 PROCEDURE — 93797 PHYS/QHP OP CAR RHAB WO ECG: CPT | Performed by: REHABILITATION PRACTITIONER

## 2021-10-13 PROCEDURE — 36415 COLL VENOUS BLD VENIPUNCTURE: CPT | Performed by: PHYSICIAN ASSISTANT

## 2021-10-13 PROCEDURE — 93798 PHYS/QHP OP CAR RHAB W/ECG: CPT | Performed by: REHABILITATION PRACTITIONER

## 2021-10-20 DIAGNOSIS — I25.10 CORONARY ARTERY DISEASE INVOLVING NATIVE CORONARY ARTERY OF NATIVE HEART WITHOUT ANGINA PECTORIS: ICD-10-CM

## 2021-10-20 RX ORDER — LOSARTAN POTASSIUM 50 MG/1
50 TABLET ORAL DAILY
Qty: 90 TABLET | Refills: 0 | Status: SHIPPED | OUTPATIENT
Start: 2021-10-20 | End: 2021-12-01

## 2021-10-22 ENCOUNTER — HOSPITAL ENCOUNTER (OUTPATIENT)
Dept: CARDIAC REHAB | Facility: CLINIC | Age: 61
End: 2021-10-22
Attending: INTERNAL MEDICINE
Payer: COMMERCIAL

## 2021-10-22 PROCEDURE — 93798 PHYS/QHP OP CAR RHAB W/ECG: CPT | Performed by: REHABILITATION PRACTITIONER

## 2021-10-25 ENCOUNTER — HOSPITAL ENCOUNTER (OUTPATIENT)
Dept: CARDIAC REHAB | Facility: CLINIC | Age: 61
End: 2021-10-25
Attending: INTERNAL MEDICINE
Payer: COMMERCIAL

## 2021-10-25 PROCEDURE — 93798 PHYS/QHP OP CAR RHAB W/ECG: CPT

## 2021-10-26 ENCOUNTER — RX ONLY (RX ONLY)
Age: 61
End: 2021-10-26

## 2021-10-26 RX ORDER — MINOCYCLINE HYDROCHLORIDE 100 MG/1
CAPSULE ORAL BID
Qty: 180 | Refills: 3 | Status: ERX | COMMUNITY
Start: 2021-10-26

## 2021-10-27 ENCOUNTER — HOSPITAL ENCOUNTER (OUTPATIENT)
Dept: CARDIAC REHAB | Facility: CLINIC | Age: 61
End: 2021-10-27
Attending: INTERNAL MEDICINE
Payer: COMMERCIAL

## 2021-10-27 PROCEDURE — 93798 PHYS/QHP OP CAR RHAB W/ECG: CPT | Performed by: REHABILITATION PRACTITIONER

## 2021-10-29 ENCOUNTER — HOSPITAL ENCOUNTER (OUTPATIENT)
Dept: CARDIAC REHAB | Facility: CLINIC | Age: 61
End: 2021-10-29
Attending: INTERNAL MEDICINE
Payer: COMMERCIAL

## 2021-10-29 PROCEDURE — 93798 PHYS/QHP OP CAR RHAB W/ECG: CPT | Performed by: REHABILITATION PRACTITIONER

## 2021-11-01 ENCOUNTER — HOSPITAL ENCOUNTER (OUTPATIENT)
Dept: CARDIAC REHAB | Facility: CLINIC | Age: 61
End: 2021-11-01
Attending: INTERNAL MEDICINE
Payer: COMMERCIAL

## 2021-11-01 PROCEDURE — 93798 PHYS/QHP OP CAR RHAB W/ECG: CPT

## 2021-11-03 ENCOUNTER — HOSPITAL ENCOUNTER (OUTPATIENT)
Dept: CARDIAC REHAB | Facility: CLINIC | Age: 61
End: 2021-11-03
Attending: INTERNAL MEDICINE
Payer: COMMERCIAL

## 2021-11-03 PROCEDURE — 93798 PHYS/QHP OP CAR RHAB W/ECG: CPT

## 2021-11-05 ENCOUNTER — HOSPITAL ENCOUNTER (OUTPATIENT)
Dept: CARDIAC REHAB | Facility: CLINIC | Age: 61
End: 2021-11-05
Attending: INTERNAL MEDICINE
Payer: COMMERCIAL

## 2021-11-05 PROCEDURE — 93798 PHYS/QHP OP CAR RHAB W/ECG: CPT

## 2021-11-08 ENCOUNTER — HOSPITAL ENCOUNTER (OUTPATIENT)
Dept: CARDIAC REHAB | Facility: CLINIC | Age: 61
End: 2021-11-08
Attending: INTERNAL MEDICINE
Payer: COMMERCIAL

## 2021-11-08 PROCEDURE — 93798 PHYS/QHP OP CAR RHAB W/ECG: CPT

## 2021-11-10 ENCOUNTER — PRE VISIT (OUTPATIENT)
Dept: CARDIOLOGY | Facility: CLINIC | Age: 61
End: 2021-11-10
Payer: COMMERCIAL

## 2021-11-12 ENCOUNTER — HOSPITAL ENCOUNTER (OUTPATIENT)
Dept: CARDIAC REHAB | Facility: CLINIC | Age: 61
End: 2021-11-12
Attending: INTERNAL MEDICINE
Payer: COMMERCIAL

## 2021-11-12 PROCEDURE — 93798 PHYS/QHP OP CAR RHAB W/ECG: CPT | Performed by: REHABILITATION PRACTITIONER

## 2021-11-15 ENCOUNTER — HOSPITAL ENCOUNTER (OUTPATIENT)
Dept: CARDIAC REHAB | Facility: CLINIC | Age: 61
End: 2021-11-15
Attending: INTERNAL MEDICINE
Payer: COMMERCIAL

## 2021-11-15 PROCEDURE — 93798 PHYS/QHP OP CAR RHAB W/ECG: CPT

## 2021-11-16 ENCOUNTER — IMMUNIZATION (OUTPATIENT)
Dept: NURSING | Facility: CLINIC | Age: 61
End: 2021-11-16
Payer: COMMERCIAL

## 2021-11-16 PROCEDURE — 0064A COVID-19,PF,MODERNA (18+ YRS BOOSTER .25ML): CPT

## 2021-11-16 PROCEDURE — 91306 COVID-19,PF,MODERNA (18+ YRS BOOSTER .25ML): CPT

## 2021-11-17 ENCOUNTER — HOSPITAL ENCOUNTER (OUTPATIENT)
Dept: CARDIAC REHAB | Facility: CLINIC | Age: 61
End: 2021-11-17
Attending: INTERNAL MEDICINE
Payer: COMMERCIAL

## 2021-11-17 PROCEDURE — 93797 PHYS/QHP OP CAR RHAB WO ECG: CPT | Mod: KX

## 2021-11-17 PROCEDURE — 93668 PERIPHERAL VASCULAR REHAB: CPT

## 2021-11-30 ENCOUNTER — LAB (OUTPATIENT)
Dept: LAB | Facility: CLINIC | Age: 61
End: 2021-11-30
Payer: COMMERCIAL

## 2021-11-30 DIAGNOSIS — I25.10 CORONARY ARTERY DISEASE INVOLVING NATIVE CORONARY ARTERY OF NATIVE HEART WITHOUT ANGINA PECTORIS: ICD-10-CM

## 2021-11-30 LAB
ALT SERPL W P-5'-P-CCNC: 38 U/L (ref 0–70)
CHOLEST SERPL-MCNC: 122 MG/DL
FASTING STATUS PATIENT QL REPORTED: YES
HDLC SERPL-MCNC: 88 MG/DL
LDLC SERPL CALC-MCNC: 20 MG/DL
NONHDLC SERPL-MCNC: 34 MG/DL
TRIGL SERPL-MCNC: 72 MG/DL

## 2021-11-30 PROCEDURE — 84460 ALANINE AMINO (ALT) (SGPT): CPT

## 2021-11-30 PROCEDURE — 36415 COLL VENOUS BLD VENIPUNCTURE: CPT

## 2021-11-30 PROCEDURE — 80061 LIPID PANEL: CPT

## 2021-12-01 ENCOUNTER — OFFICE VISIT (OUTPATIENT)
Dept: CARDIOLOGY | Facility: CLINIC | Age: 61
End: 2021-12-01
Attending: PHYSICIAN ASSISTANT
Payer: COMMERCIAL

## 2021-12-01 VITALS
HEIGHT: 71 IN | HEART RATE: 73 BPM | OXYGEN SATURATION: 97 % | WEIGHT: 188.7 LBS | SYSTOLIC BLOOD PRESSURE: 142 MMHG | BODY MASS INDEX: 26.42 KG/M2 | DIASTOLIC BLOOD PRESSURE: 84 MMHG

## 2021-12-01 DIAGNOSIS — R53.83 OTHER FATIGUE: ICD-10-CM

## 2021-12-01 DIAGNOSIS — R07.89 ATYPICAL CHEST PAIN: ICD-10-CM

## 2021-12-01 DIAGNOSIS — I10 BENIGN ESSENTIAL HYPERTENSION: ICD-10-CM

## 2021-12-01 DIAGNOSIS — E78.00 PURE HYPERCHOLESTEROLEMIA: ICD-10-CM

## 2021-12-01 DIAGNOSIS — R94.39 ABNORMAL CARDIOVASCULAR STRESS TEST: ICD-10-CM

## 2021-12-01 DIAGNOSIS — I25.10 CORONARY ARTERY DISEASE INVOLVING NATIVE CORONARY ARTERY OF NATIVE HEART WITHOUT ANGINA PECTORIS: Primary | ICD-10-CM

## 2021-12-01 PROCEDURE — 99215 OFFICE O/P EST HI 40 MIN: CPT | Performed by: INTERNAL MEDICINE

## 2021-12-01 RX ORDER — ZINC GLUCONATE 50 MG
TABLET ORAL
COMMUNITY
Start: 2021-01-01

## 2021-12-01 RX ORDER — MAGNESIUM GLUCONATE 30 MG(550)
TABLET ORAL
COMMUNITY
Start: 2021-01-01

## 2021-12-01 RX ORDER — LOSARTAN POTASSIUM 100 MG/1
100 TABLET ORAL DAILY
Qty: 90 TABLET | Refills: 3
Start: 2021-12-01 | End: 2022-01-03 | Stop reason: ALTCHOICE

## 2021-12-01 RX ORDER — ROSUVASTATIN CALCIUM 20 MG/1
20 TABLET, COATED ORAL DAILY
Qty: 90 TABLET | Refills: 3
Start: 2021-12-01 | End: 2022-01-03

## 2021-12-01 RX ORDER — SILDENAFIL 50 MG/1
TABLET, FILM COATED ORAL
COMMUNITY
Start: 2021-11-11

## 2021-12-01 RX ORDER — RAMELTEON 8 MG/1
TABLET ORAL
COMMUNITY
Start: 2021-11-11

## 2021-12-01 ASSESSMENT — MIFFLIN-ST. JEOR: SCORE: 1688.07

## 2021-12-01 NOTE — PROGRESS NOTES
HPI and Plan:   See dictation    Today's clinic visit entailed:  Review of the result(s) of each unique test - Angiogram, lab work  The following tests were independently interpreted by me as noted in my documentation: Angiogram  Ordering of each unique test  Prescription drug management  60 minutes spent on the date of the encounter doing chart review, history and exam, documentation and further activities per the note  Provider  Link to MDM Help Grid     The level of medical decision making during this visit was of moderate complexity.      Orders Placed This Encounter   Procedures     Lipid Profile     Follow-Up with Cardiac Advanced Practice Provider     Follow-Up with Cardiologist     Exercise Stress Echocardiogram       Orders Placed This Encounter   Medications     ramelteon (ROZEREM) 8 MG tablet     Potassium Gluconate 595 (99 K) MG TABS     sildenafil (VIAGRA) 50 MG tablet     Sig: TAKE 1 TABLET BEFORE SEXUAL ACTIVITY. MAX 100MG/24HRS     zinc gluconate 50 MG tablet     rosuvastatin (CRESTOR) 20 MG tablet     Sig: Take 1 tablet (20 mg) by mouth daily     Dispense:  90 tablet     Refill:  3     losartan (COZAAR) 100 MG tablet     Sig: Take 1 tablet (100 mg) by mouth daily     Dispense:  90 tablet     Refill:  3       Medications Discontinued During This Encounter   Medication Reason     metoprolol succinate ER (TOPROL XL) 25 MG 24 hr tablet      rosuvastatin (CRESTOR) 40 MG tablet      losartan (COZAAR) 50 MG tablet          Encounter Diagnoses   Name Primary?     Coronary artery disease involving native coronary artery of native heart without angina pectoris Yes     Atypical chest pain      Other fatigue      Benign essential hypertension      Pure hypercholesterolemia      Abnormal cardiovascular stress test        CURRENT MEDICATIONS:  Current Outpatient Medications   Medication Sig Dispense Refill     acetaminophen (TYLENOL) 325 MG tablet Take 2 tablets (650 mg) by mouth every 4 hours as needed for mild  pain or headaches       ASPIRIN EC PO Take 81 mg by mouth daily       Flaxseed, Linseed, (FLAX SEED OIL) 1000 MG capsule Take 1 capsule by mouth daily       glucosamine-chondroitin 500-400 MG CAPS Take 2 capsules by mouth daily        hydrocortisone (WESTCORT) 0.2 % cream Apply topically 2 times daily as needed        loratadine (CLARITIN) 10 MG capsule Take 10 mg by mouth daily       losartan (COZAAR) 100 MG tablet Take 1 tablet (100 mg) by mouth daily 90 tablet 3     Lysine 500 MG TABS Take 1 tablet by mouth daily.       magnesium 500 MG TABS Take 500 mg by mouth every evening       MINOCYCLINE HCL PO Take 100 mg by mouth daily as needed (acne)        Multiple Vitamin (MULTI-VITAMIN) per tablet Take 1 tablet by mouth daily.       nitroGLYcerin (NITROSTAT) 0.4 MG sublingual tablet Place 1 tablet (0.4 mg) under the tongue every 5 minutes as needed for chest pain Administer every 5 minutes as needed.  Maximum 3 doses in 15 minutes. 30 tablet 0     Potassium Gluconate 595 (99 K) MG TABS        prasugrel (EFFIENT) 10 MG TABS tablet Take 1 tablet (10 mg) by mouth daily 90 tablet 3     ramelteon (ROZEREM) 8 MG tablet        rosuvastatin (CRESTOR) 20 MG tablet Take 1 tablet (20 mg) by mouth daily 90 tablet 3     sildenafil (VIAGRA) 50 MG tablet TAKE 1 TABLET BEFORE SEXUAL ACTIVITY. MAX 100MG/24HRS       zinc gluconate 50 MG tablet          ALLERGIES     Allergies   Allergen Reactions     Seasonal Allergies      Hydrocodone-Acetaminophen Other (See Comments)     FEELS WIRED  FEELS WIRED  Hyperactive, insomnia.        Molds & Smuts        PAST MEDICAL HISTORY:  Past Medical History:   Diagnosis Date     Adenoma of ascending colon      Aortic root dilation (H)      Chest pain      Coronary artery disease     5/2012: Stent to proximal LAD for unstable angina. Proximal left circumflex 30-40%. EF 60%     HTN (hypertension)      Hyperlipidaemia      Iliac artery aneurysm, left (H)        PAST SURGICAL HISTORY:  Past Surgical  History:   Procedure Laterality Date     APPENDECTOMY OPEN       ARTHROSCOPY KNEE BILATERAL      both knees     ARTHROSCOPY SHOULDER      left     C ORAL SURGERY PROCEDURE       CARDIAC SURGERY      stent x 1     COLONOSCOPY N/A 2/17/2017    Procedure: COLONOSCOPY;  Surgeon: Junie Grier MD;  Location:  OR     COLONOSCOPY  12/2016    [place] [facility] [place]     COLONOSCOPY  05/22/2017    Dr. Grier [facility]     COLONOSCOPY N/A 5/29/2018    Procedure: COLONOSCOPY;  colonoscopy (crsal);  Surgeon: Junie Grier MD;  Location:  GI     CV HEART CATHETERIZATION WITH POSSIBLE INTERVENTION N/A 7/28/2021    Procedure: Heart Catheterization with Possible Intervention;  Surgeon: Buddy Martin MD;  Location:  HEART CARDIAC CATH LAB     CV INTRAVASULAR ULTRASOUND N/A 7/30/2021    Procedure: Intravascular Ultrasound;  Surgeon: Mitchell Morales MD;  Location:  HEART CARDIAC CATH LAB     CV PCI STENT DRUG ELUTING N/A 7/30/2021    Procedure: Percutaneous Coronary Intervention Stent Drug Eluting;  Surgeon: Mitchell Morales MD;  Location:  HEART CARDIAC CATH LAB     LAPAROSCOPIC HERNIORRHAPHY INGUINAL  12/30/2013    Procedure: LAPAROSCOPIC HERNIORRHAPHY INGUINAL;  LAPAROSCOPIC RIGHT INGUINAL HERNIA REPAIR WITH MESH;  Surgeon: Reji Pizano MD;  Location: [facility]     ORTHOPEDIC SURGERY      scope of both shoulders     VASECTOMY         FAMILY HISTORY:  Family History   Problem Relation Age of Onset     Breast Cancer Mother      Cerebrovascular Disease Father      Breast Cancer Maternal Grandmother      Breast Cancer Son      Colon Cancer No family hx of        SOCIAL HISTORY:  Social History     Socioeconomic History     Marital status:      Spouse name: None     Number of children: None     Years of education: None     Highest education level: None   Occupational History     None   Tobacco Use     Smoking status: Never Smoker     Smokeless tobacco: Never Used   Vaping Use      "Vaping Use: Never used   Substance and Sexual Activity     Alcohol use: Yes     Comment: 2-3 per week     Drug use: No     Sexual activity: None   Other Topics Concern     Parent/sibling w/ CABG, MI or angioplasty before 65F 55M? Not Asked   Social History Narrative     None     Social Determinants of Health     Financial Resource Strain: Not on file   Food Insecurity: Not on file   Transportation Needs: Not on file   Physical Activity: Not on file   Stress: Not on file   Social Connections: Not on file   Intimate Partner Violence: Not on file   Housing Stability: Not on file       Review of Systems:  Skin:  Negative       Eyes:  Positive for glasses    ENT:  Negative      Respiratory:  Positive for dyspnea at rest     Cardiovascular:    Positive for;fatigue pressure and discomfort in the chest-noticed when tired.  Does not radiate.  Last episode on 9/11/21. Goes away after resting. Periods of energy and then fatigue.  Noticed more after beta blocker administration.  Gastroenterology: Negative      Genitourinary:  Negative      Musculoskeletal:  Positive for arthritis    Neurologic:  Negative      Psychiatric:  Negative      Heme/Lymph/Imm:  Positive for allergies;hay fever seasonal  Endocrine:  Negative        Physical Exam:  Vitals: BP (!) 142/84   Pulse 73   Ht 1.803 m (5' 11\")   Wt 85.6 kg (188 lb 11.2 oz)   SpO2 97%   BMI 26.32 kg/m      Constitutional:  cooperative, alert and oriented, well developed, well nourished, in no acute distress   fit appearing    Skin:  warm and dry to the touch, no apparent skin lesions or masses noted          Head:  normocephalic, no masses or lesions        Eyes:  pupils equal and round, conjunctivae and lids unremarkable, sclera white, no xanthalasma, EOMS intact, no nystagmus        Lymph:      ENT:  no pallor or cyanosis, dentition good        Neck:  carotid pulses are full and equal bilaterally;no carotid bruit        Respiratory:  normal breath sounds, clear to " auscultation, normal A-P diameter, normal symmetry, normal respiratory excursion, no use of accessory muscles         Cardiac: regular rhythm;normal S1 and S2       systolic ejection murmur;grade 1;LUSB        pulses full and equal                                        GI:           Extremities and Muscular Skeletal:  no edema;no spinal abnormalities noted;normal muscle strength and tone              Neurological:  no gross motor deficits        Psych:  affect appropriate, oriented to time, person and place        CC  Junie Matthew PA-C  [address]  [place] [address]

## 2021-12-01 NOTE — PROGRESS NOTES
Service Date: 12/01/2021    CLINIC VISIT    HISTORY OF PRESENT ILLNESS:  Chava is a very nice 61-year-old gentleman who appears much younger than his stated age and appears to be a very fit individual.    PAST MEDICAL HISTORY:  Significant for stenting of his proximal left anterior descending artery due to unstable angina 2012, and in 07/2021, due to unstable angina, underwent complex stenting of a very angulated first obtuse marginal, requiring stents in his obtuse marginal, proximal circumflex and mid circumflex coronary artery.  This did result in some plaque shift into his proximal left anterior descending artery.  Nonetheless, this resulted in complete resolution of his symptoms.  However, patient has been plagued by fatigue, tiredness and lack of energy which have improved somewhat with decreasing his metoprolol to 12.5 mg daily.    He has hypercholesterolemia and ischemic cardiomyopathy and mildly dilated ascending aorta.  I first met him in 2017 when he wanted to establish with Cardiology.  Was having some side effects with statins and wanted to treat things in a more natural fashion.    Chris, over the years, has been a model patient.  Works out at least 5 days a week, alternating bike riding with walking.  He does resistance activity.  When he retired in 2020, he ramped up his exercise even more and follows a very strict diet.    Side effects included vivid dreams with rosuvastatin, feeling lousy on simvastatin, ultimately switching to atorvastatin, backing off to 10 mg on an every-other-day basis.  This was increased with his recent intervention.    Due to persistent fatigue, tiredness, he sought a second opinion through [facility] which did review his angiograms, recommending a followup stress test in 6 months to look for restenosis, recommending coenzyme Q10 for myalgias.    We did a followup stress test on him in September for which he was able to exercise 13 minutes of standard Frantz protocol  without symptoms, EKG changes.  Echo portion did appear to demonstrate a small area of apical and anteroseptal ischemia at a very high workload.  In discussing matters with him, we decided to continue with medical management.    Chava returns to clinic with his wife and they are quite frustrated with his persistent fatigue and tiredness.  He describes this as just lack of energy, although relates that he recently has been doing some workouts up to an hour, went kayaking for an hour, had no symptoms.  An hour or so later, did develop transient chest discomfort lasting about 5 minutes but for the most part, is having minimal chest discomfort.    His wife is concerned.  She thinks he looks pale.  His daughters do not think he is the same person he was before his last intervention.  He is concerned about his statin being too high and his cholesterol being overtreated.    He is having no bleeding problems with his Effient therapy.    ASSESSMENT AND PLAN:  Chava does not appear to be having any classic exertional anginal symptoms.  I did review his angiogram.  There clearly was some plaque shift into left anterior descending artery.  Ultimately, he did get a good result in the circumflex and first obtuse marginal.  I have told him I agree we should do a stress test in another 3 months, as that will be 6 months out from his July intervention.  He does go down to [place] and we would do this when he returns in February.    He does not appear to be having any symptoms of heart failure or significant arrhythmia.    He clearly still appears to be having beta blocker side effects and I have recommended that he get rid of his metoprolol altogether.  He is down to 12.5 mg.  We talked about beta blocker rebound.    Blood pressure is still not ideally controlled.  We talked about the SPRINT trial.  He is 142/84 at home.  He typically gets in the 135 or above.  We will increase his losartan back to 100 mg.  Prior to his last  intervention, he was on 100/25.  Depending on how he does with stopping his beta blocker, if there is any question of vasospasm, I may consider adding amlodipine 2.5 mg to his regimen.  If this is not the case, then I would put him back on his 25 mg of hydrochlorothiazide if we need to achieve SPRINT-like numbers.    As stated, we will plan on a followup stress test in 3 months, sooner if he should develop symptoms.    Weight is 188 pounds.  Body mass index is 26.3.    I congratulated him on his exercise regimen and encouraged him to continue to do so.    Fasting lipid profile probably is overtreated.  Total cholesterol is 122, HDL is 88, LDL is 20, triglycerides are 72.  We did talk about the ODYSSEY and the FOURIER trial.  At this time, we are going to cut his rosuvastatin from 40 mg back to 20 mg.  We will recheck a fasting lipid profile in 1 month.  My goal would be to have his LDL 50 or lower.  I think 20 is overkill at this time.    Basic metabolic profile shows normal creatinine of 0.77 and normal electrolytes.  As stated, changing the losartan.  I will recheck this when he comes back.    Over 60 minutes was spent with Chava and his wife today, answering their questions, reviewing their studies.    Jan Park MD, [facility]        D: 2021   T: 2021   MT: yohannes    Name:     NGA RIOS  MRN:      [id number]        Account:      [id number]   :      1960           Service Date: 2021       Document: [id number]

## 2021-12-01 NOTE — LETTER
12/1/2021    Bharathi Chowdary MD  [address]  [facility] [address]    RE: Chris Joni Linus       Dear Colleague,    I had the pleasure of seeing Chris Quinones Linus in the [facility].    HPI and Plan:   See dictation    Today's clinic visit entailed:  Review of the result(s) of each unique test - Angiogram, lab work  The following tests were independently interpreted by me as noted in my documentation: Angiogram  Ordering of each unique test  Prescription drug management  60 minutes spent on the date of the encounter doing chart review, history and exam, documentation and further activities per the note  Provider  Link to MDM Help Grid     The level of medical decision making during this visit was of moderate complexity.      Orders Placed This Encounter   Procedures     Lipid Profile     Follow-Up with Cardiac Advanced Practice Provider     Follow-Up with Cardiologist     Exercise Stress Echocardiogram       Orders Placed This Encounter   Medications     ramelteon (ROZEREM) 8 MG tablet     Potassium Gluconate 595 (99 K) MG TABS     sildenafil (VIAGRA) 50 MG tablet     Sig: TAKE 1 TABLET BEFORE SEXUAL ACTIVITY. MAX 100MG/24HRS     zinc gluconate 50 MG tablet     rosuvastatin (CRESTOR) 20 MG tablet     Sig: Take 1 tablet (20 mg) by mouth daily     Dispense:  90 tablet     Refill:  3     losartan (COZAAR) 100 MG tablet     Sig: Take 1 tablet (100 mg) by mouth daily     Dispense:  90 tablet     Refill:  3       Medications Discontinued During This Encounter   Medication Reason     metoprolol succinate ER (TOPROL XL) 25 MG 24 hr tablet      rosuvastatin (CRESTOR) 40 MG tablet      losartan (COZAAR) 50 MG tablet          Encounter Diagnoses   Name Primary?     Coronary artery disease involving native coronary artery of native heart without angina pectoris Yes     Atypical chest pain      Other fatigue      Benign essential hypertension      Pure  hypercholesterolemia      Abnormal cardiovascular stress test        CURRENT MEDICATIONS:  Current Outpatient Medications   Medication Sig Dispense Refill     acetaminophen (TYLENOL) 325 MG tablet Take 2 tablets (650 mg) by mouth every 4 hours as needed for mild pain or headaches       ASPIRIN EC PO Take 81 mg by mouth daily       Flaxseed, Linseed, (FLAX SEED OIL) 1000 MG capsule Take 1 capsule by mouth daily       glucosamine-chondroitin 500-400 MG CAPS Take 2 capsules by mouth daily        hydrocortisone (WESTCORT) 0.2 % cream Apply topically 2 times daily as needed        loratadine (CLARITIN) 10 MG capsule Take 10 mg by mouth daily       losartan (COZAAR) 100 MG tablet Take 1 tablet (100 mg) by mouth daily 90 tablet 3     Lysine 500 MG TABS Take 1 tablet by mouth daily.       magnesium 500 MG TABS Take 500 mg by mouth every evening       MINOCYCLINE HCL PO Take 100 mg by mouth daily as needed (acne)        Multiple Vitamin (MULTI-VITAMIN) per tablet Take 1 tablet by mouth daily.       nitroGLYcerin (NITROSTAT) 0.4 MG sublingual tablet Place 1 tablet (0.4 mg) under the tongue every 5 minutes as needed for chest pain Administer every 5 minutes as needed.  Maximum 3 doses in 15 minutes. 30 tablet 0     Potassium Gluconate 595 (99 K) MG TABS        prasugrel (EFFIENT) 10 MG TABS tablet Take 1 tablet (10 mg) by mouth daily 90 tablet 3     ramelteon (ROZEREM) 8 MG tablet        rosuvastatin (CRESTOR) 20 MG tablet Take 1 tablet (20 mg) by mouth daily 90 tablet 3     sildenafil (VIAGRA) 50 MG tablet TAKE 1 TABLET BEFORE SEXUAL ACTIVITY. MAX 100MG/24HRS       zinc gluconate 50 MG tablet          ALLERGIES     Allergies   Allergen Reactions     Seasonal Allergies      Hydrocodone-Acetaminophen Other (See Comments)     FEELS WIRED  FEELS WIRED  Hyperactive, insomnia.        Molds & Smuts        PAST MEDICAL HISTORY:  Past Medical History:   Diagnosis Date     Adenoma of ascending colon      Aortic root dilation (H)       Chest pain      Coronary artery disease     5/2012: Stent to proximal LAD for unstable angina. Proximal left circumflex 30-40%. EF 60%     HTN (hypertension)      Hyperlipidaemia      Iliac artery aneurysm, left (H)        PAST SURGICAL HISTORY:  Past Surgical History:   Procedure Laterality Date     APPENDECTOMY OPEN       ARTHROSCOPY KNEE BILATERAL      both knees     ARTHROSCOPY SHOULDER      left     C ORAL SURGERY PROCEDURE       CARDIAC SURGERY      stent x 1     COLONOSCOPY N/A 2/17/2017    Procedure: COLONOSCOPY;  Surgeon: Junie Grier MD;  Location:  OR     COL[place]OS[place]  12/2016    [place] [facility] [place]     COLONOSCOPY  05/22/2017    Dr. rGier [facility]     COLONOSCOPY N/A 5/29/2018    Procedure: COLONOSCOPY;  colonoscopy (crsal);  Surgeon: Junie Grier MD;  Location:  GI     CV HEART CATHETERIZATION WITH POSSIBLE INTERVENTION N/A 7/28/2021    Procedure: Heart Catheterization with Possible Intervention;  Surgeon: Buddy Martin MD;  Location:  HEART CARDIAC CATH LAB     CV INTRAVASULAR ULTRASOUND N/A 7/30/2021    Procedure: Intravascular Ultrasound;  Surgeon: Mitchell Morales MD;  Location:  HEART CARDIAC CATH LAB     CV PCI STENT DRUG ELUTING N/A 7/30/2021    Procedure: Percutaneous Coronary Intervention Stent Drug Eluting;  Surgeon: Mitchell Morales MD;  Location:  HEART CARDIAC CATH LAB     LAPAROSCOPIC HERNIORRHAPHY INGUINAL  12/30/2013    Procedure: LAPAROSCOPIC HERNIORRHAPHY INGUINAL;  LAPAROSCOPIC RIGHT INGUINAL HERNIA REPAIR WITH MESH;  Surgeon: Reji Pizano MD;  Location: [facility]     ORTHOPEDIC SURGERY      scope of both shoulders     VASECTOMY         FAMILY HISTORY:  Family History   Problem Relation Age of Onset     Breast Cancer Mother      Cerebrovascular Disease Father      Breast Cancer Maternal Grandmother      Breast Cancer Son      Colon Cancer No family hx of        SOCIAL HISTORY:  Social History     Socioeconomic History     Marital  "status:      Spouse name: None     Number of children: None     Years of education: None     Highest education level: None   Occupational History     None   Tobacco Use     Smoking status: Never Smoker     Smokeless tobacco: Never Used   Vaping Use     Vaping Use: Never used   Substance and Sexual Activity     Alcohol use: Yes     Comment: 2-3 per week     Drug use: No     Sexual activity: None   Other Topics Concern     Parent/sibling w/ CABG, MI or angioplasty before 65F 55M? Not Asked   Social History Narrative     None     Social Determinants of Health     Financial Resource Strain: Not on file   Food Insecurity: Not on file   Transportation Needs: Not on file   Physical Activity: Not on file   Stress: Not on file   Social Connections: Not on file   Intimate Partner Violence: Not on file   Housing Stability: Not on file       Review of Systems:  Skin:  Negative       Eyes:  Positive for glasses    ENT:  Negative      Respiratory:  Positive for dyspnea at rest     Cardiovascular:    Positive for;fatigue pressure and discomfort in the chest-noticed when tired.  Does not radiate.  Last episode on 9/11/21. Goes away after resting. Periods of energy and then fatigue.  Noticed more after beta blocker administration.  Gastroenterology: Negative      Genitourinary:  Negative      Musculoskeletal:  Positive for arthritis    Neurologic:  Negative      Psychiatric:  Negative      Heme/Lymph/Imm:  Positive for allergies;hay fever seasonal  Endocrine:  Negative        Physical Exam:  Vitals: BP (!) 142/84   Pulse 73   Ht 1.803 m (5' 11\")   Wt 85.6 kg (188 lb 11.2 oz)   SpO2 97%   BMI 26.32 kg/m      Constitutional:  cooperative, alert and oriented, well developed, well nourished, in no acute distress   fit appearing    Skin:  warm and dry to the touch, no apparent skin lesions or masses noted          Head:  normocephalic, no masses or lesions        Eyes:  pupils equal and round, conjunctivae and lids " unremarkable, sclera white, no xanthalasma, EOMS intact, no nystagmus        Lymph:      ENT:  no pallor or cyanosis, dentition good        Neck:  carotid pulses are full and equal bilaterally;no carotid bruit        Respiratory:  normal breath sounds, clear to auscultation, normal A-P diameter, normal symmetry, normal respiratory excursion, no use of accessory muscles         Cardiac: regular rhythm;normal S1 and S2       systolic ejection murmur;grade 1;LUSB        pulses full and equal                                        GI:           Extremities and Muscular Skeletal:  no edema;no spinal abnormalities noted;normal muscle strength and tone              Neurological:  no gross motor deficits        Psych:  affect appropriate, oriented to time, person and place            Thank you for allowing me to participate in the care of your patient.    Sincerely,     Jan Park MD     [facility]

## 2022-01-03 ENCOUNTER — VIRTUAL VISIT (OUTPATIENT)
Dept: CARDIOLOGY | Facility: CLINIC | Age: 62
End: 2022-01-03
Attending: INTERNAL MEDICINE
Payer: COMMERCIAL

## 2022-01-03 ENCOUNTER — LAB (OUTPATIENT)
Dept: LAB | Facility: CLINIC | Age: 62
End: 2022-01-03
Payer: COMMERCIAL

## 2022-01-03 DIAGNOSIS — E78.5 HYPERLIPIDEMIA LDL GOAL <70: ICD-10-CM

## 2022-01-03 DIAGNOSIS — I25.10 CORONARY ARTERY DISEASE INVOLVING NATIVE CORONARY ARTERY OF NATIVE HEART WITHOUT ANGINA PECTORIS: Primary | ICD-10-CM

## 2022-01-03 DIAGNOSIS — I25.10 CORONARY ARTERY DISEASE INVOLVING NATIVE CORONARY ARTERY OF NATIVE HEART WITHOUT ANGINA PECTORIS: ICD-10-CM

## 2022-01-03 DIAGNOSIS — I10 BENIGN ESSENTIAL HYPERTENSION: ICD-10-CM

## 2022-01-03 LAB
ANION GAP SERPL CALCULATED.3IONS-SCNC: 3 MMOL/L (ref 3–14)
BUN SERPL-MCNC: 24 MG/DL (ref 7–30)
CALCIUM SERPL-MCNC: 8.9 MG/DL (ref 8.5–10.1)
CHLORIDE BLD-SCNC: 107 MMOL/L (ref 94–109)
CHOLEST SERPL-MCNC: 130 MG/DL
CO2 SERPL-SCNC: 28 MMOL/L (ref 20–32)
CREAT SERPL-MCNC: 0.8 MG/DL (ref 0.66–1.25)
FASTING STATUS PATIENT QL REPORTED: YES
GFR SERPL CREATININE-BSD FRML MDRD: >90 ML/MIN/1.73M2
GLUCOSE BLD-MCNC: 96 MG/DL (ref 70–99)
HDLC SERPL-MCNC: 83 MG/DL
LDLC SERPL CALC-MCNC: 23 MG/DL
NONHDLC SERPL-MCNC: 47 MG/DL
POTASSIUM BLD-SCNC: 4.1 MMOL/L (ref 3.4–5.3)
SODIUM SERPL-SCNC: 138 MMOL/L (ref 133–144)
TRIGL SERPL-MCNC: 118 MG/DL

## 2022-01-03 PROCEDURE — 36415 COLL VENOUS BLD VENIPUNCTURE: CPT

## 2022-01-03 PROCEDURE — 80061 LIPID PANEL: CPT

## 2022-01-03 PROCEDURE — 80048 BASIC METABOLIC PNL TOTAL CA: CPT

## 2022-01-03 PROCEDURE — 99214 OFFICE O/P EST MOD 30 MIN: CPT | Mod: GT | Performed by: NURSE PRACTITIONER

## 2022-01-03 RX ORDER — LOSARTAN POTASSIUM AND HYDROCHLOROTHIAZIDE 25; 100 MG/1; MG/1
1 TABLET ORAL DAILY
Qty: 90 TABLET | Refills: 3 | Status: SHIPPED | OUTPATIENT
Start: 2022-01-03 | End: 2022-12-30

## 2022-01-03 RX ORDER — ROSUVASTATIN CALCIUM 20 MG/1
20 TABLET, COATED ORAL DAILY
Qty: 90 TABLET | Refills: 3 | Status: SHIPPED | OUTPATIENT
Start: 2022-01-03 | End: 2022-09-27 | Stop reason: SINTOL

## 2022-01-03 NOTE — PATIENT INSTRUCTIONS
Thanks for participating in a virtual visit with the [facility] today.  Doing well on a cardiac standpoint  Significant improvement in fatigue after stopping metoprolol  Blood pressure is not well controlled.  Add 25 mg hydrochlorothiazide to current dose of losartan.   BMP upon return from [place].  New prescription has been sent for Hyzaar  New prescription has been sent for rosuvastatin  Continue to monitor for any recurrent chest pains.  Seek ER evaluation immediately if pains become severe.  Follow-up stress echocardiogram in 3 months    Follow up in 3 months with Dr. Park    Please call my nurse at [phone] with any questions or concerns.    Scheduling phone number: [phone]  Reminder: Please bring in all current medications, over the counter supplements and vitamin bottles to your next appointment.

## 2022-01-03 NOTE — PROGRESS NOTES
"Chava is a 61 year old who is being evaluated via a billable video visit.      How would you like to obtain your AVS? MyChart  If the video visit is dropped, the invitation should be resent by: Text to cell phone: [phone]  Will anyone else be joining your video visit? No      Review Of Systems  Skin: Bruising  Eyes:Ears/Nose/Throat: Glasses  Respiratory: NEGATIVE  Cardiovascular:NEGATIVE  Gastrointestinal: NEGATIVE  Genitourinary:NEGATIVE   Musculoskeletal: Arthritis  Neurologic: NEGATIVE  Psychiatric: NEGATIVE  Hematologic/Lymphatic/Immunologic: NEGATIVE  Endocrine:  NEGATIVE  Vitals - Patient Reported  Systolic (Patient Reported): (!) 141  Diastolic (Patient Reported): 89  Weight (Patient Reported): 81.6 kg (180 lb)  Height (Patient Reported): 180.3 cm (5' 11\")  BMI (Based on Pt Reported Ht/Wt): 25.1  Pulse (Patient Reported): 76    Telephone number of patient: [phone]    ANISHA Conner LPN          CARDIOLOGY CLINIC VIDEO VISIT    Chris Barriga is a 61 year old male who is being evaluated via a billable video visit.      The patient has been notified of following:     This video visit will be conducted via a call between you and your physician/provider. We have found that certain health care needs can be provided without the need for an in-person physical exam.  This service lets us provide the care you need with a video conversation.  If a prescription is necessary we can send it directly to your pharmacy.  If lab work is needed we can place an order for that and you can then stop by our lab to have the test done at a later time.    Virtual visits are billed at different rates depending on your insurance coverage. During this emergency period, for some insurers they may be billed the same as an in-person visit.  Please reach out to your insurance provider with any questions.    If during the course of the call the physician/provider feels a video visit is not appropriate, you will not be charged for " this service.      Physician has received verbal consent for a Video Visit from the patient? Yes    I have reviewed and updated the patient's Past Medical History, Social History, Family History and Medication List.    MEDICATIONS:  Current Outpatient Medications   Medication Sig Dispense Refill     acetaminophen (TYLENOL) 325 MG tablet Take 2 tablets (650 mg) by mouth every 4 hours as needed for mild pain or headaches       ASPIRIN EC PO Take 81 mg by mouth daily       Flaxseed, Linseed, (FLAX SEED OIL) 1000 MG capsule Take 1 capsule by mouth daily       glucosamine-chondroitin 500-400 MG CAPS Take 2 capsules by mouth daily        hydrocortisone (WESTCORT) 0.2 % cream Apply topically 2 times daily as needed        loratadine (CLARITIN) 10 MG capsule Take 10 mg by mouth daily       losartan (COZAAR) 100 MG tablet Take 1 tablet (100 mg) by mouth daily 90 tablet 3     Lysine 500 MG TABS Take 1 tablet by mouth daily.       magnesium 500 MG TABS Take 500 mg by mouth every evening       MINOCYCLINE HCL PO Take 100 mg by mouth daily as needed (acne)        Multiple Vitamin (MULTI-VITAMIN) per tablet Take 1 tablet by mouth daily.       nitroGLYcerin (NITROSTAT) 0.4 MG sublingual tablet Place 1 tablet (0.4 mg) under the tongue every 5 minutes as needed for chest pain Administer every 5 minutes as needed.  Maximum 3 doses in 15 minutes. 30 tablet 0     Potassium Gluconate 595 (99 K) MG TABS        prasugrel (EFFIENT) 10 MG TABS tablet Take 1 tablet (10 mg) by mouth daily 90 tablet 3     ramelteon (ROZEREM) 8 MG tablet        rosuvastatin (CRESTOR) 20 MG tablet Take 1 tablet (20 mg) by mouth daily 90 tablet 3     sildenafil (VIAGRA) 50 MG tablet TAKE 1 TABLET BEFORE SEXUAL ACTIVITY. MAX 100MG/24HRS       zinc gluconate 50 MG tablet          ALLERGIES  Seasonal allergies, Hydrocodone-acetaminophen, and Molds & smuts    Brief physical exam:  General: In no acute distress, upright and calm.  Eyes: No apparent redness or  discharge.   Chest: No labored breathing, no cough during exam or audible wheezing.   Neuro: No obvious focal defects or tremors.   Psych: Alert and oriented. Does not appear anxious.     The rest of a comprehensive physical examination is deferred due to public health emergency video visit restrictions.     Primary cardiologist: Dr. Park    HPI:  Chris Franco is a very pleasant 61-year-old male who carries a past medical history significant for coronary artery disease status post stenting to the proximal LAD in 2012 and PCI to the first OM, proximal circumflex and mid circumflex in 2021, ischemic cardiomyopathy, hypercholesterolemia, hypertension and mildly dilated ascending aorta.    He was last seen on 12/1/2021 with multiple concerns consisting of chest pain with peak intensity workouts, fatigue, despite regular exercise, and statin side effects.  A stress echo in September of 2021 showed mild ischemia in the anterior septal and apical wall.  EF greater than 70%.  Medical management was recommended.  He is scheduled for follow-up stress echo in March. 2022. Due to fatigue his metoprolol was discontinued. Losartan was uptitrated to 100mg daily for better blood pressure control.     Today he is participating in a follow up virtual visit.  He reports a significant improvement in his fatigue after washing out of beta-blocker.  He has noticed a single episode of mild chest pain, quickly relieved with nitroglycerin.  At present, he denies chest pain, shortness of breath, palpitations, PND, orthopnea, presyncope, syncope, or lower extremity edema.    Blood pressures have consistently remained in the 140s systolic, despite up titration of losartan.    BMP today showed a sodium of 138, potassium 4.1, BUN 24, creatinine 0.80, and GFR >90.  Weight stable at 180 pounds  Lipid panel today showed a total cholesterol of 130, HDL 83, LDL 23, and triglycerides 118.  Rosuvastatin was recently reduced from 40 to 20 mg  daily.    Denies any evidence of bleeding on dual antiplatelet therapy.   Compliant with all medications.    Assessment/Plan:  Chris Franco is a very pleasant 61-year-old male who carries a past medical history significant for coronary artery disease status post stenting to the proximal LAD in 2012 and PCI to the first OM, proximal circumflex and mid circumflex in 2021, ischemic cardiomyopathy, hypercholesterolemia, hypertension and mildly dilated ascending aorta.    Overall, he is doing well on a cardiac standpoint.  He has had a noticeable improvement in his fatigue after stopping metoprolol.  Blood pressure remains consistently in the 140s systolic.  I will add 25 mg hydrochlorothiazide to his current regimen of losartan.  He does report an occasional episode of chest discomfort at maximum once per week.  We discussed possible low-dose amlodipine but will hold off for now unless episodes become more frequent or lasting longer duration.  He does have PRN sublingual nitroglycerin.  He is scheduled for a follow-up stress echocardiogram in March and follow-up with Dr. Park after. BMP was normal today. Lipid panel was excellent with a LDL of 23 after recent reduction in rosuvastatin.  I will continue on all other medications.  Encourage continued exercise and heart healthy diet.  He is planning to travel to [place] today where he will spend the next 6 weeks.  He will get a follow-up BMP upon his return.  Encouraged to notify the office with any worsening symptoms or seek ER evaluation if any symptoms become severe.      Video-Visit Details    Type of service:  Video Visit    Video Start Time: 9:43 am  Video End Time: 9:56 AM  An additional 20 minutes was spent reviewing chart and documentation.     Originating Location (pt. Location): Home    Distant Location (provider location):  [facility]- Virtual    Platform used for Video Visit: BETH Vora, CNP  Pager  [phone]  1/3/2022

## 2022-01-03 NOTE — LETTER
"1/3/2022    Bharathi Chowdary MD  [address]  [facility] [address]    RE: Chris Barriga     Dear Colleague,     I had the pleasure of seeing Chris Barriga in the [facility].  Chava is a 61 year old who is being evaluated via a billable video visit.      How would you like to obtain your AVS? MyChart  If the video visit is dropped, the invitation should be resent by: Text to cell phone: [phone]  Will anyone else be joining your video visit? No      Review Of Systems  Skin: Bruising  Eyes:Ears/Nose/Throat: Glasses  Respiratory: NEGATIVE  Cardiovascular:NEGATIVE  Gastrointestinal: NEGATIVE  Genitourinary:NEGATIVE   Musculoskeletal: Arthritis  Neurologic: NEGATIVE  Psychiatric: NEGATIVE  Hematologic/Lymphatic/Immunologic: NEGATIVE  Endocrine:  NEGATIVE  Vitals - Patient Reported  Systolic (Patient Reported): (!) 141  Diastolic (Patient Reported): 89  Weight (Patient Reported): 81.6 kg (180 lb)  Height (Patient Reported): 180.3 cm (5' 11\")  BMI (Based on Pt Reported Ht/Wt): 25.1  Pulse (Patient Reported): 76    Telephone number of patient: [phone]    ANISHA Conner LPN          CARDIOLOGY CLINIC VIDEO VISIT    Chris Barriga is a 61 year old male who is being evaluated via a billable video visit.      The patient has been notified of following:     This video visit will be conducted via a call between you and your physician/provider. We have found that certain health care needs can be provided without the need for an in-person physical exam.  This service lets us provide the care you need with a video conversation.  If a prescription is necessary we can send it directly to your pharmacy.  If lab work is needed we can place an order for that and you can then stop by our lab to have the test done at a later time.    Virtual visits are billed at different rates depending on your insurance coverage. During this emergency period, for some insurers they may be billed the same " as an in-person visit.  Please reach out to your insurance provider with any questions.    If during the course of the call the physician/provider feels a video visit is not appropriate, you will not be charged for this service.      Physician has received verbal consent for a Video Visit from the patient? Yes    I have reviewed and updated the patient's Past Medical History, Social History, Family History and Medication List.    MEDICATIONS:  Current Outpatient Medications   Medication Sig Dispense Refill     acetaminophen (TYLENOL) 325 MG tablet Take 2 tablets (650 mg) by mouth every 4 hours as needed for mild pain or headaches       ASPIRIN EC PO Take 81 mg by mouth daily       Flaxseed, Linseed, (FLAX SEED OIL) 1000 MG capsule Take 1 capsule by mouth daily       glucosamine-chondroitin 500-400 MG CAPS Take 2 capsules by mouth daily        hydrocortisone (WESTCORT) 0.2 % cream Apply topically 2 times daily as needed        loratadine (CLARITIN) 10 MG capsule Take 10 mg by mouth daily       losartan (COZAAR) 100 MG tablet Take 1 tablet (100 mg) by mouth daily 90 tablet 3     Lysine 500 MG TABS Take 1 tablet by mouth daily.       magnesium 500 MG TABS Take 500 mg by mouth every evening       MINOCYCLINE HCL PO Take 100 mg by mouth daily as needed (acne)        Multiple Vitamin (MULTI-VITAMIN) per tablet Take 1 tablet by mouth daily.       nitroGLYcerin (NITROSTAT) 0.4 MG sublingual tablet Place 1 tablet (0.4 mg) under the tongue every 5 minutes as needed for chest pain Administer every 5 minutes as needed.  Maximum 3 doses in 15 minutes. 30 tablet 0     Potassium Gluconate 595 (99 K) MG TABS        prasugrel (EFFIENT) 10 MG TABS tablet Take 1 tablet (10 mg) by mouth daily 90 tablet 3     ramelteon (ROZEREM) 8 MG tablet        rosuvastatin (CRESTOR) 20 MG tablet Take 1 tablet (20 mg) by mouth daily 90 tablet 3     sildenafil (VIAGRA) 50 MG tablet TAKE 1 TABLET BEFORE SEXUAL ACTIVITY. MAX 100MG/24HRS       zinc  gluconate 50 MG tablet          ALLERGIES  Seasonal allergies, Hydrocodone-acetaminophen, and Molds & smuts    Brief physical exam:  General: In no acute distress, upright and calm.  Eyes: No apparent redness or discharge.   Chest: No labored breathing, no cough during exam or audible wheezing.   Neuro: No obvious focal defects or tremors.   Psych: Alert and oriented. Does not appear anxious.     The rest of a comprehensive physical examination is deferred due to public health emergency video visit restrictions.     Primary cardiologist: Dr. Park    HPI:  Chris Franco is a very pleasant 61-year-old male who carries a past medical history significant for coronary artery disease status post stenting to the proximal LAD in 2012 and PCI to the first OM, proximal circumflex and mid circumflex in 2021, ischemic cardiomyopathy, hypercholesterolemia, hypertension and mildly dilated ascending aorta.    He was last seen on 12/1/2021 with multiple concerns consisting of chest pain with peak intensity workouts, fatigue, despite regular exercise, and statin side effects.  A stress echo in September of 2021 showed mild ischemia in the anterior septal and apical wall.  EF greater than 70%.  Medical management was recommended.  He is scheduled for follow-up stress echo in March. 2022. Due to fatigue his metoprolol was discontinued. Losartan was uptitrated to 100mg daily for better blood pressure control.     Today he is participating in a follow up virtual visit.  He reports a significant improvement in his fatigue after washing out of beta-blocker.  He has noticed a single episode of mild chest pain, quickly relieved with nitroglycerin.  At present, he denies chest pain, shortness of breath, palpitations, PND, orthopnea, presyncope, syncope, or lower extremity edema.    Blood pressures have consistently remained in the 140s systolic, despite up titration of losartan.    BMP today showed a sodium of 138, potassium 4.1, BUN  24, creatinine 0.80, and GFR >90.  Weight stable at 180 pounds  Lipid panel today showed a total cholesterol of 130, HDL 83, LDL 23, and triglycerides 118.  Rosuvastatin was recently reduced from 40 to 20 mg daily.    Denies any evidence of bleeding on dual antiplatelet therapy.   Compliant with all medications.    Assessment/Plan:  Chris Franco is a very pleasant 61-year-old male who carries a past medical history significant for coronary artery disease status post stenting to the proximal LAD in 2012 and PCI to the first OM, proximal circumflex and mid circumflex in 2021, ischemic cardiomyopathy, hypercholesterolemia, hypertension and mildly dilated ascending aorta.    Overall, he is doing well on a cardiac standpoint.  He has had a noticeable improvement in his fatigue after stopping metoprolol.  Blood pressure remains consistently in the 140s systolic.  I will add 25 mg hydrochlorothiazide to his current regimen of losartan.  He does report an occasional episode of chest discomfort at maximum once per week.  We discussed possible low-dose amlodipine but will hold off for now unless episodes become more frequent or lasting longer duration.  He does have PRN sublingual nitroglycerin.  He is scheduled for a follow-up stress echocardiogram in March and follow-up with Dr. Park after. BMP was normal today. Lipid panel was excellent with a LDL of 23 after recent reduction in rosuvastatin.  I will continue on all other medications.  Encourage continued exercise and heart healthy diet.  He is planning to travel to [place] today where he will spend the next 6 weeks.  He will get a follow-up BMP upon his return.  Encouraged to notify the office with any worsening symptoms or seek ER evaluation if any symptoms become severe.    Video-Visit Details  Type of service:  Video Visit  Video Start Time: 9:43 am  Video End Time: 9:56 AM  An additional 20 minutes was spent reviewing chart and documentation.  Originating  Location (pt. Location): Home  Distant Location (provider location):  [facility]- Virtual  Platform used for Video Visit: BETH Vora, CNP  Pager [phone]  1/3/2022     cc:   Jan Park MD  [address]  [place]  [place] [address]

## 2022-03-01 ENCOUNTER — HOSPITAL ENCOUNTER (OUTPATIENT)
Dept: CARDIOLOGY | Facility: CLINIC | Age: 62
Discharge: HOME | End: 2022-03-01
Attending: INTERNAL MEDICINE | Admitting: INTERNAL MEDICINE
Payer: COMMERCIAL

## 2022-03-01 ENCOUNTER — LAB (OUTPATIENT)
Dept: LAB | Facility: CLINIC | Age: 62
End: 2022-03-01
Payer: COMMERCIAL

## 2022-03-01 ENCOUNTER — PRE VISIT (OUTPATIENT)
Dept: CARDIOLOGY | Facility: CLINIC | Age: 62
End: 2022-03-01

## 2022-03-01 DIAGNOSIS — I25.10 CORONARY ARTERY DISEASE INVOLVING NATIVE CORONARY ARTERY OF NATIVE HEART WITHOUT ANGINA PECTORIS: ICD-10-CM

## 2022-03-01 DIAGNOSIS — I10 BENIGN ESSENTIAL HYPERTENSION: ICD-10-CM

## 2022-03-01 LAB
ANION GAP SERPL CALCULATED.3IONS-SCNC: 5 MMOL/L (ref 3–14)
BUN SERPL-MCNC: 20 MG/DL (ref 7–30)
CALCIUM SERPL-MCNC: 9 MG/DL (ref 8.5–10.1)
CHLORIDE BLD-SCNC: 105 MMOL/L (ref 94–109)
CO2 SERPL-SCNC: 27 MMOL/L (ref 20–32)
CREAT SERPL-MCNC: 0.91 MG/DL (ref 0.66–1.25)
GFR SERPL CREATININE-BSD FRML MDRD: >90 ML/MIN/1.73M2
GLUCOSE BLD-MCNC: 101 MG/DL (ref 70–99)
POTASSIUM BLD-SCNC: 3.6 MMOL/L (ref 3.4–5.3)
SODIUM SERPL-SCNC: 137 MMOL/L (ref 133–144)

## 2022-03-01 PROCEDURE — 93016 CV STRESS TEST SUPVJ ONLY: CPT | Performed by: INTERNAL MEDICINE

## 2022-03-01 PROCEDURE — 80048 BASIC METABOLIC PNL TOTAL CA: CPT | Performed by: INTERNAL MEDICINE

## 2022-03-01 PROCEDURE — 93018 CV STRESS TEST I&R ONLY: CPT | Performed by: INTERNAL MEDICINE

## 2022-03-01 PROCEDURE — 36415 COLL VENOUS BLD VENIPUNCTURE: CPT | Performed by: INTERNAL MEDICINE

## 2022-03-01 PROCEDURE — 93350 STRESS TTE ONLY: CPT | Mod: 26 | Performed by: INTERNAL MEDICINE

## 2022-03-01 PROCEDURE — 93017 CV STRESS TEST TRACING ONLY: CPT

## 2022-03-01 PROCEDURE — C8928 TTE W OR W/O FOL W/CON,STRES: HCPCS

## 2022-03-01 PROCEDURE — 93321 DOPPLER ECHO F-UP/LMTD STD: CPT | Mod: 26 | Performed by: INTERNAL MEDICINE

## 2022-03-01 PROCEDURE — 255N000002 HC RX 255 OP 636: Performed by: INTERNAL MEDICINE

## 2022-03-01 PROCEDURE — 93325 DOPPLER ECHO COLOR FLOW MAPG: CPT | Mod: 26 | Performed by: INTERNAL MEDICINE

## 2022-03-01 RX ADMIN — HUMAN ALBUMIN MICROSPHERES AND PERFLUTREN 9 ML: 10; .22 INJECTION, SOLUTION INTRAVENOUS at 13:19

## 2022-03-12 ENCOUNTER — HEALTH MAINTENANCE LETTER (OUTPATIENT)
Age: 62
End: 2022-03-12

## 2022-03-23 ENCOUNTER — TELEPHONE (OUTPATIENT)
Dept: CARDIOLOGY | Facility: CLINIC | Age: 62
End: 2022-03-23

## 2022-03-23 ENCOUNTER — OFFICE VISIT (OUTPATIENT)
Dept: CARDIOLOGY | Facility: CLINIC | Age: 62
End: 2022-03-23
Attending: INTERNAL MEDICINE
Payer: COMMERCIAL

## 2022-03-23 VITALS
DIASTOLIC BLOOD PRESSURE: 98 MMHG | BODY MASS INDEX: 26.32 KG/M2 | HEART RATE: 68 BPM | HEIGHT: 71 IN | SYSTOLIC BLOOD PRESSURE: 154 MMHG | WEIGHT: 188 LBS

## 2022-03-23 DIAGNOSIS — E78.00 PURE HYPERCHOLESTEROLEMIA: ICD-10-CM

## 2022-03-23 DIAGNOSIS — I10 BENIGN ESSENTIAL HYPERTENSION: ICD-10-CM

## 2022-03-23 DIAGNOSIS — I25.10 CORONARY ARTERY DISEASE INVOLVING NATIVE CORONARY ARTERY OF NATIVE HEART WITHOUT ANGINA PECTORIS: Primary | ICD-10-CM

## 2022-03-23 PROCEDURE — 99214 OFFICE O/P EST MOD 30 MIN: CPT | Performed by: INTERNAL MEDICINE

## 2022-03-23 NOTE — PROGRESS NOTES
Service Date: 03/23/2022    CLINIC VISIT     HISTORY OF PRESENT ILLNESS:  Chava is a very nice, 61-year-old gentleman who appears much younger than his stated age and appears to be a very fit individual.    His past medical history is significant for stenting of his proximal left anterior descending artery due to unstable angina in 2012 and in 07/2020 presenting with unstable angina and underwent complex stenting of a very angulated first obtuse marginal requiring stents in his obtuse marginal, proximal circumflex and mid circumflex.  This did result in some plaque shift into his proximal left anterior descending artery.  Nonetheless, this resulted in complete resolution of his symptoms.  The patient has been plagued with fatigue, tiredness and lack of energy, which resolved with discontinuing his metoprolol.    He did seek a second opinion at [facility] to review his angiogram after his complex procedure of July.    He also has hypercholesterolemia and ischemic cardiomyopathy with a mildly dilated ascending aorta.  I first met him in 2017 when he wanted to establish with Cardiology and was having some side effects with statins.    Chava returns to clinic stating that he is doing much better. Stopping the beta blocker has resulted in improvement in his overall well being.  He is back to riding his bike 10-15 miles a day without any symptoms.  He notes no side effects or problems with his current medical regimen other than some increased bruisability.  Denies dyspnea on exertion, orthopnea or PND.  No lightheadedness, dizziness, syncope or near syncope.    We did do a followup stress echo to reevaluate the plaque shift, and he was able to exercise to a high workload achieving 103% of his target heart rate, and the stress echo appeared to be completely normal.    ASSESSMENT AND PLAN:  Chava appears to be doing well from a cardiac standpoint without clinical evidence of ischemia, heart failure or significant  arrhythmia.    Blood pressure is quite high today, although he brings in a long list of blood pressures, the vast majority with systolics in the 120s-130s.  Blood pressure today is 154/98.  I am not going to intensify his antihypertensive regimen at this time, but just ask that he call in his blood pressure and that we verify it is well controlled.    He is concerned because his weight is up to 188 pounds.  He blames this on the beta blockade and not exercising as diligently due to its side effects.  However, 188 gives him a body mass index of 26.2 with clothes on.    I congratulated him on his exercise regimen and encouraged him to continue to do so.  He asked about whether we would do routine stress test going forward, and as he had symptoms both times and exercises on a regular basis, I told him I do not think surveillance stress testing is necessary.    Fasting lipid profile is outstanding.  We actually adjusted his rosuvastatin down previously.  He is now on 20 mg, and even with that, cholesterol is 130, HDL 83, LDL 23, triglycerides 118.  I will continue this as is.  I will recheck in 6 months.  I may back off on his rosuvastatin further, given the fact that the most aggressive current trials ODYSSEY and FOURIER done with PCSK9 inhibitors drove LDLs down below 35.    Over 30 minutes was spent with Chava today.    Thank you for allowing me to participate in his care.    Jan Park MD, [facility]        D: 2022   T: 2022   MT: [email or web address]    Name:     NGA RIOS  MRN:      [id number]        Account:      [id number]   :      1960           Service Date: 2022       Document: [id number]

## 2022-03-23 NOTE — TELEPHONE ENCOUNTER
Call from patient, his home BP after his visit today was 126/73. Results entered into screening log.    Will message Dr. Park with update

## 2022-03-23 NOTE — PROGRESS NOTES
HPI and Plan:   See dictation    Today's clinic visit entailed:  Review of the result(s) of each unique test - Stress test and lab work  Ordering of each unique test  Prescription drug management  30 minutes spent on the date of the encounter doing chart review, history and exam, documentation and further activities per the note  Provider  Link to MDM Help Grid     The level of medical decision making during this visit was of moderate complexity.      Orders Placed This Encounter   Procedures     Lipid Profile     ALT     Follow-Up with Cardiology       No orders of the defined types were placed in this encounter.      There are no discontinued medications.      Encounter Diagnoses   Name Primary?     Coronary artery disease involving native coronary artery of native heart without angina pectoris Yes     Benign essential hypertension      Pure hypercholesterolemia        CURRENT MEDICATIONS:  Current Outpatient Medications   Medication Sig Dispense Refill     acetaminophen (TYLENOL) 325 MG tablet Take 2 tablets (650 mg) by mouth every 4 hours as needed for mild pain or headaches       ASPIRIN EC PO Take 81 mg by mouth daily       Flaxseed, Linseed, (FLAX SEED OIL) 1000 MG capsule Take 1 capsule by mouth daily       glucosamine-chondroitin 500-400 MG CAPS Take 2 capsules by mouth daily        hydrocortisone (WESTCORT) 0.2 % cream Apply topically 2 times daily as needed        loratadine (CLARITIN) 10 MG capsule Take 10 mg by mouth daily       losartan-hydrochlorothiazide (HYZAAR) 100-25 MG tablet Take 1 tablet by mouth daily 90 tablet 3     Lysine 500 MG TABS Take 1 tablet by mouth daily.       magnesium 500 MG TABS Take 500 mg by mouth every evening       MINOCYCLINE HCL PO Take 100 mg by mouth daily as needed (acne)        Multiple Vitamin (MULTI-VITAMIN) per tablet Take 1 tablet by mouth daily.       nitroGLYcerin (NITROSTAT) 0.4 MG sublingual tablet Place 1 tablet (0.4 mg) under the tongue every 5 minutes as  needed for chest pain Administer every 5 minutes as needed.  Maximum 3 doses in 15 minutes. 30 tablet 0     Potassium Gluconate 595 (99 K) MG TABS        prasugrel (EFFIENT) 10 MG TABS tablet Take 1 tablet (10 mg) by mouth daily 90 tablet 3     ramelteon (ROZEREM) 8 MG tablet        rosuvastatin (CRESTOR) 20 MG tablet Take 1 tablet (20 mg) by mouth daily 90 tablet 3     sildenafil (VIAGRA) 50 MG tablet TAKE 1 TABLET BEFORE SEXUAL ACTIVITY. MAX 100MG/24HRS       zinc gluconate 50 MG tablet          ALLERGIES     Allergies   Allergen Reactions     Seasonal Allergies      Hydrocodone-Acetaminophen Other (See Comments)     FEELS WIRED  FEELS WIRED  Hyperactive, insomnia.        Molds & Smuts        PAST MEDICAL HISTORY:  Past Medical History:   Diagnosis Date     Adenoma of ascending colon      Aortic root dilation (H)      Chest pain      Coronary artery disease     5/2012: Stent to proximal LAD for unstable angina. Proximal left circumflex 30-40%. EF 60%     HTN (hypertension)      Hyperlipidaemia      Iliac artery aneurysm, left (H)        PAST SURGICAL HISTORY:  Past Surgical History:   Procedure Laterality Date     APPENDECTOMY OPEN       ARTHROSCOPY KNEE BILATERAL      both knees     ARTHROSCOPY SHOULDER      left     CARDIAC SURGERY      stent x 1     COLONOSCOPY N/A 2/17/2017    Procedure: COLONOSCOPY;  Surgeon: Junie Grier MD;  Location:  OR     COLONOSCOPY  12/2016    [place] [facility] [place]     COLONOSCOPY  05/22/2017    Dr. Grier On license of [facility]     COLONOSCOPY N/A 5/29/2018    Procedure: COLONOSCOPY;  colonoscopy (crsal);  Surgeon: Junie Grier MD;  Location:  GI     CV HEART CATHETERIZATION WITH POSSIBLE INTERVENTION N/A 7/28/2021    Procedure: Heart Catheterization with Possible Intervention;  Surgeon: Buddy Martin MD;  Location:  HEART CARDIAC CATH LAB     CV INTRAVASULAR ULTRASOUND N/A 7/30/2021    Procedure: Intravascular Ultrasound;  Surgeon: Mitchell Morales MD;   Location:  HEART CARDIAC CATH LAB     CV PCI STENT DRUG ELUTING N/A 7/30/2021    Procedure: Percutaneous Coronary Intervention Stent Drug Eluting;  Surgeon: Mitchell Morales MD;  Location:  HEART CARDIAC CATH LAB     LAPAROSCOPIC HERNIORRHAPHY INGUINAL  12/30/2013    Procedure: LAPAROSCOPIC HERNIORRHAPHY INGUINAL;  LAPAROSCOPIC RIGHT INGUINAL HERNIA REPAIR WITH MESH;  Surgeon: Reji Pizano MD;  Location: [facility]     ORTHOPEDIC SURGERY      scope of both shoulders     VASECTOMY       ZZC ORAL SURGERY PROCEDURE         FAMILY HISTORY:  Family History   Problem Relation Age of Onset     Breast Cancer Mother      Cerebrovascular Disease Father      Breast Cancer Maternal Grandmother      Breast Cancer Son      Colon Cancer No family hx of        SOCIAL HISTORY:  Social History     Socioeconomic History     Marital status:      Spouse name: None     Number of children: None     Years of education: None     Highest education level: None   Occupational History     None   Tobacco Use     Smoking status: Never Smoker     Smokeless tobacco: Never Used   Vaping Use     Vaping Use: Never used   Substance and Sexual Activity     Alcohol use: Yes     Comment: 1 glass of wine occ     Drug use: No     Sexual activity: None   Other Topics Concern     Parent/sibling w/ CABG, MI or angioplasty before 65F 55M? Not Asked   Social History Narrative     None     Social Determinants of Health     Financial Resource Strain: Not on file   Food Insecurity: Not on file   Transportation Needs: Not on file   Physical Activity: Not on file   Stress: Not on file   Social Connections: Not on file   Intimate Partner Violence: Not on file   Housing Stability: Not on file       Review of Systems:  Skin:  Negative       Eyes:  Positive for glasses    ENT:  Negative      Respiratory:  Negative       Cardiovascular:    Positive for;fatigue Notices symptoms when tired.  Gastroenterology: Negative      Genitourinary:  Negative     "  Musculoskeletal:  Negative      Neurologic:  Negative      Psychiatric:  Negative      Heme/Lymph/Imm:  Positive for allergies    Endocrine:  Negative        Physical Exam:  Vitals: BP (!) 154/98 (BP Location: Right arm, Cuff Size: Adult Regular)   Pulse 68   Ht 1.803 m (5' 11\")   Wt 85.3 kg (188 lb)   BMI 26.22 kg/m      Constitutional:  cooperative, alert and oriented, well developed, well nourished, in no acute distress   fit appearing    Skin:  warm and dry to the touch, no apparent skin lesions or masses noted          Head:  normocephalic, no masses or lesions        Eyes:  pupils equal and round, conjunctivae and lids unremarkable, sclera white, no xanthalasma, EOMS intact, no nystagmus        Lymph:      ENT:  no pallor or cyanosis, dentition good        Neck:  carotid pulses are full and equal bilaterally;no carotid bruit        Respiratory:  normal breath sounds, clear to auscultation, normal A-P diameter, normal symmetry, normal respiratory excursion, no use of accessory muscles         Cardiac: regular rhythm;normal S1 and S2       systolic ejection murmur;LUSB;grade 2        pulses full and equal                                        GI:           Extremities and Muscular Skeletal:  no edema;no spinal abnormalities noted;normal muscle strength and tone              Neurological:  no gross motor deficits        Psych:  affect appropriate, oriented to time, person and place        CC  Jan Park MD  [address]  [place] [id number]              "

## 2022-03-23 NOTE — LETTER
3/23/2022    Bharathi Chowdary MD  [address] [facility] [address]  [facility] [address]    RE: Chris Gomezohue       Dear Colleague,     I had the [facility] of seeing [facility] Barriga [facility].  HPI and Plan:   See dictation    Today's clinic visit entailed:  Review of the result(s) of each unique test - Stress test and lab work  Ordering of each unique test  Prescription drug management  30 minutes spent on the date of the encounter doing chart review, history and exam, documentation and further activities per the note  Provider  Link to [employer] Help Grid     The level of medical decision making during this visit was of moderate complexity.      Orders Placed This Encounter   Procedures     Lipid Profile     ALT     Follow-Up with Cardiology       No orders of the defined types were placed in this encounter.      There are no discontinued medications.      Encounter Diagnoses   Name Primary?     Coronary artery disease involving native coronary artery of native heart without angina pectoris Yes     Benign essential hypertension      Pure hypercholesterolemia        CURRENT MEDICATIONS:  Current Outpatient Medications   Medication Sig Dispense Refill     acetaminophen (TYLENOL) 325 MG tablet Take 2 tablets (650 mg) by mouth every 4 hours as needed for mild pain or headaches       ASPIRIN EC PO Take 81 mg by mouth daily       Flaxseed, Linseed, (FLAX SEED OIL) 1000 MG capsule Take 1 capsule by mouth daily       glucosamine-chondroitin 500-400 MG CAPS Take 2 capsules by mouth daily        hydrocortisone (WESTCORT) 0.2 % cream Apply topically 2 times daily as needed        loratadine (CLARITIN) 10 MG capsule Take 10 mg by mouth daily       losartan-hydrochlorothiazide (HYZAAR) 100-25 MG tablet Take 1 tablet by mouth daily 90 tablet 3     Lysine 500 MG TABS Take 1 tablet by mouth daily.       magnesium 500 MG TABS Take 500 mg by mouth every evening       MINOCYCLINE HCL PO Take 100 mg by mouth daily  as needed (acne)        Multiple Vitamin (MULTI-VITAMIN) per tablet Take 1 tablet by mouth daily.       nitroGLYcerin (NITROSTAT) 0.4 MG sublingual tablet Place 1 tablet (0.4 mg) under the tongue every 5 minutes as needed for chest pain Administer every 5 minutes as needed.  Maximum 3 doses in 15 minutes. 30 tablet 0     Potassium Gluconate 595 (99 K) MG TABS        prasugrel (EFFIENT) 10 MG TABS tablet Take 1 tablet (10 mg) by mouth daily 90 tablet 3     ramelteon (ROZEREM) 8 MG tablet        rosuvastatin (CRESTOR) 20 MG tablet Take 1 tablet (20 mg) by mouth daily 90 tablet 3     sildenafil (VIAGRA) 50 MG tablet TAKE 1 TABLET BEFORE SEXUAL ACTIVITY. MAX 100MG/24HRS       zinc gluconate 50 MG tablet          ALLERGIES     Allergies   Allergen Reactions     Seasonal Allergies      Hydrocodone-Acetaminophen Other (See Comments)     FEELS WIRED  FEELS WIRED  Hyperactive, insomnia.        Molds & Smuts        PAST MEDICAL HISTORY:  Past Medical History:   Diagnosis Date     Adenoma of ascending colon      Aortic root dilation (H)      Chest pain      Coronary artery disease     5/2012: Stent to proximal LAD for unstable angina. Proximal left circumflex 30-40%. EF 60%     HTN (hypertension)      Hyperlipidaemia      Iliac artery aneurysm, left (H)        PAST SURGICAL HISTORY:  Past Surgical History:   Procedure Laterality Date     APPENDECTOMY OPEN       ARTHROSCOPY KNEE BILATERAL      both knees     ARTHROSCOPY SHOULDER      left     CARDIAC SURGERY      stent x 1     COLONOSCOPY N/A 2/17/2017    Procedure: COLONOSCOPY;  Surgeon: Junie Grier MD;  Location:  OR     COLONOSCOPY  12/2016    [place] [facility] [place]     COLONOSCOPY  05/22/2017    Dr. Grier [facility]     COLONOSCOPY N/A 5/29/2018    Procedure: COLONOSCOPY;  colonoscopy (crsal);  Surgeon: Junie Grier MD;  Location:  GI     CV HEART CATHETERIZATION WITH POSSIBLE INTERVENTION N/A 7/28/2021    Procedure: Heart Catheterization with Possible  Intervention;  Surgeon: Buddy Martin MD;  Location:  HEART CARDIAC CATH LAB     CV INTRAVASULAR ULTRASOUND N/A 7/30/2021    Procedure: Intravascular Ultrasound;  Surgeon: Mitchell Morales MD;  Location:  HEART CARDIAC CATH LAB     CV PCI STENT DRUG ELUTING N/A 7/30/2021    Procedure: Percutaneous Coronary Intervention Stent Drug Eluting;  Surgeon: Mitchell Morales MD;  Location:  HEART CARDIAC CATH LAB     LAPAROSCOPIC HERNIORRHAPHY INGUINAL  12/30/2013    Procedure: LAPAROSCOPIC HERNIORRHAPHY INGUINAL;  LAPAROSCOPIC RIGHT INGUINAL HERNIA REPAIR WITH MESH;  Surgeon: Reji Pizano MD;  Location: [facility]     ORTHOPEDIC SURGERY      scope of both shoulders     VASECTOMY       ZZC ORAL SURGERY PROCEDURE         FAMILY HISTORY:  Family History   Problem Relation Age of Onset     Breast Cancer Mother      Cerebrovascular Disease Father      Breast Cancer Maternal Grandmother      Breast Cancer Son      Colon Cancer No family hx of        SOCIAL HISTORY:  Social History     Socioeconomic History     Marital status:      Spouse name: None     Number of children: None     Years of education: None     Highest education level: None   Occupational History     None   Tobacco Use     Smoking status: Never Smoker     Smokeless tobacco: Never Used   Vaping Use     Vaping Use: Never used   Substance and Sexual Activity     Alcohol use: Yes     Comment: 1 glass of wine occ     Drug use: No     Sexual activity: None   Other Topics Concern     Parent/sibling w/ CABG, MI or angioplasty before 65F 55M? Not Asked   Social History Narrative     None     Social Determinants of Health     Financial Resource Strain: Not on file   Food Insecurity: Not on file   Transportation Needs: Not on file   Physical Activity: Not on file   Stress: Not on file   Social Connections: Not on file   Intimate Partner Violence: Not on file   Housing Stability: Not on file       Review of Systems:  Skin:  Negative      "  Eyes:  Positive for glasses    ENT:  Negative      Respiratory:  Negative       Cardiovascular:    Positive for;fatigue Notices symptoms when tired.  Gastroenterology: Negative      Genitourinary:  Negative      Musculoskeletal:  Negative      Neurologic:  Negative      Psychiatric:  Negative      Heme/Lymph/Imm:  Positive for allergies    Endocrine:  Negative        Physical Exam:  Vitals: BP (!) 154/98 (BP Location: Right arm, Cuff Size: Adult Regular)   Pulse 68   Ht 1.803 m (5' 11\")   Wt 85.3 kg (188 lb)   BMI 26.22 kg/m      Constitutional:  cooperative, alert and oriented, well developed, well nourished, in no acute distress   fit appearing    Skin:  warm and dry to the touch, no apparent skin lesions or masses noted          Head:  normocephalic, no masses or lesions        Eyes:  pupils equal and round, conjunctivae and lids unremarkable, sclera white, no xanthalasma, EOMS intact, no nystagmus        Lymph:      ENT:  no pallor or cyanosis, dentition good        Neck:  carotid pulses are full and equal bilaterally;no carotid bruit        Respiratory:  normal breath sounds, clear to auscultation, normal A-P diameter, normal symmetry, normal respiratory excursion, no use of accessory muscles         Cardiac: regular rhythm;normal S1 and S2       systolic ejection murmur;LUSB;grade 2        pulses full and equal                                        GI:           Extremities and Muscular Skeletal:  no edema;no spinal abnormalities noted;normal muscle strength and tone              Neurological:  no gross motor deficits        Psych:  affect appropriate, oriented to time, person and place        CC  Jan Park MD  [address]  [place] [address]    Thank you for allowing me to participate in the care of your patient.      Sincerely,     Jan Park MD     [facility]  "

## 2022-05-13 ENCOUNTER — IMMUNIZATION (OUTPATIENT)
Dept: NURSING | Facility: CLINIC | Age: 62
End: 2022-05-13
Payer: COMMERCIAL

## 2022-05-13 PROCEDURE — 0064A COVID-19,PF,MODERNA (18+ YRS BOOSTER .25ML): CPT

## 2022-05-13 PROCEDURE — 91306 COVID-19,PF,MODERNA (18+ YRS BOOSTER .25ML): CPT

## 2022-07-12 ENCOUNTER — MYC MEDICAL ADVICE (OUTPATIENT)
Dept: CARDIOLOGY | Facility: CLINIC | Age: 62
End: 2022-07-12

## 2022-07-12 DIAGNOSIS — I25.10 CORONARY ARTERY DISEASE INVOLVING NATIVE CORONARY ARTERY OF NATIVE HEART WITHOUT ANGINA PECTORIS: Primary | ICD-10-CM

## 2022-07-12 RX ORDER — CLOPIDOGREL BISULFATE 75 MG/1
TABLET ORAL
Qty: 90 TABLET | Refills: 3 | Status: SHIPPED | OUTPATIENT
Start: 2022-07-12 | End: 2023-06-21

## 2022-07-12 NOTE — TELEPHONE ENCOUNTER
Dr Park's reply sent to patient via kasia-    Jan Park MD  [facility] Heart Team 2  Going forward I would like to stop both effient and ASA and treat him with clopidogrel 75 mgs daily with a 300 mg load Thanks

## 2022-07-12 NOTE — TELEPHONE ENCOUNTER
My Chart message - 7-12-22  Dr. Park:     I just received notification from [employer] that my blood thinner will be up for renewal soon.  As I recall, I was to take this for a year post stent procedure.  Please confirm whether I should continue to take this after July 30; if so, I likely will need another refill.  My next appointment with you is scheduled for 9/27/2022.     Thanks.     Chris Barriga [phone]    Next Dr. Park annual OV 9-27-22    Last Dr. Park OV 3-23-22 -

## 2022-09-20 ENCOUNTER — MYC MEDICAL ADVICE (OUTPATIENT)
Dept: CARDIOLOGY | Facility: CLINIC | Age: 62
End: 2022-09-20

## 2022-09-20 DIAGNOSIS — E78.00 PURE HYPERCHOLESTEROLEMIA: ICD-10-CM

## 2022-09-20 DIAGNOSIS — I25.10 CORONARY ARTERY DISEASE INVOLVING NATIVE CORONARY ARTERY OF NATIVE HEART WITHOUT ANGINA PECTORIS: Primary | ICD-10-CM

## 2022-09-20 NOTE — TELEPHONE ENCOUNTER
My chart message from patient:  Dr. Park:     I have my roughly 1 year follow up appointment on 9/27/2022.  I'm sure we will discuss several things, but 2 in particular I want to discuss: 1) Current statin - I continue to have some adverse reactions to the rosuvastatin (poor sleep, depression, moodiness), which I believe I had before (similar to simvastatin as well) when taking this medication.  I was on lipitor prior to my last procedure due to some of these reactions.  I'd like to discuss moving to something different since I seem to be reacting again. 2) Plavix - I want to understand more about this change and any long term effects.  Look forward to seeing you next week.     Chris Barriga     Reply from Dr. Park:      JULIENNE TAVAREZ. Options include cutting you rosuvastatin in half and see if it is dose dependent. Go back to atorvastatin/lipitor if you were tolerating it in the past, or switch to pravastatin the most gentle and weakest statin.  Multiple studies, most recently the [employer] meta- analysis showed that after one year it was safer and better to stay on clopidogrel longterm instead of ASA      Message from patient:      Thanks.  How about if I go back to atorvastatin / lipitor and see if I tolerate it better since it seemed fine before.  If not, we still have the pravastatin as another option.  Let me know if this works for you.     Thanks.     Chris Barriga    2nd message forwarded to Dr. Park

## 2022-09-27 RX ORDER — ATORVASTATIN CALCIUM 10 MG/1
10 TABLET, FILM COATED ORAL DAILY
Qty: 90 TABLET | Refills: 1 | Status: SHIPPED | OUTPATIENT
Start: 2022-09-27 | End: 2023-02-21

## 2022-09-27 NOTE — TELEPHONE ENCOUNTER
Message from Dr. Park:  Jan Park MD  P [facility] Heart Team 2  Please switch patient to Atorvastatin 10 mgs daily and recheck a flp in 4-8 weeks     Called patient to review Dr. Park's recommendation to try lipitor 10mg daily. [facility] per patient request. He will be seeing Dr. Park on 11/23/2022 with flp at the visit.

## 2022-10-11 ENCOUNTER — RX ONLY (RX ONLY)
Age: 62
End: 2022-10-11

## 2022-10-11 RX ORDER — MINOCYCLINE HYDROCHLORIDE 100 MG/1
CAPSULE ORAL BID
Qty: 180 | Refills: 0 | Status: ERX

## 2022-11-07 ENCOUNTER — APPOINTMENT (OUTPATIENT)
Dept: URBAN - METROPOLITAN AREA CLINIC 256 | Age: 62
Setting detail: DERMATOLOGY
End: 2022-11-07

## 2022-11-07 VITALS — WEIGHT: 180 LBS | HEIGHT: 60 IN

## 2022-11-07 DIAGNOSIS — Z71.89 OTHER SPECIFIED COUNSELING: ICD-10-CM

## 2022-11-07 DIAGNOSIS — L81.4 OTHER MELANIN HYPERPIGMENTATION: ICD-10-CM

## 2022-11-07 DIAGNOSIS — L57.0 ACTINIC KERATOSIS: ICD-10-CM

## 2022-11-07 DIAGNOSIS — Z85.828 PERSONAL HISTORY OF OTHER MALIGNANT NEOPLASM OF SKIN: ICD-10-CM

## 2022-11-07 DIAGNOSIS — L81.8 OTHER SPECIFIED DISORDERS OF PIGMENTATION: ICD-10-CM

## 2022-11-07 DIAGNOSIS — L28.1 PRURIGO NODULARIS: ICD-10-CM

## 2022-11-07 DIAGNOSIS — D22 MELANOCYTIC NEVI: ICD-10-CM

## 2022-11-07 DIAGNOSIS — L82.1 OTHER SEBORRHEIC KERATOSIS: ICD-10-CM

## 2022-11-07 DIAGNOSIS — D18.0 HEMANGIOMA: ICD-10-CM

## 2022-11-07 DIAGNOSIS — L57.8 OTHER SKIN CHANGES DUE TO CHRONIC EXPOSURE TO NONIONIZING RADIATION: ICD-10-CM

## 2022-11-07 DIAGNOSIS — L70.2 ACNE VARIOLIFORMIS: ICD-10-CM

## 2022-11-07 PROBLEM — D22.5 MELANOCYTIC NEVI OF TRUNK: Status: ACTIVE | Noted: 2022-11-07

## 2022-11-07 PROBLEM — D18.01 HEMANGIOMA OF SKIN AND SUBCUTANEOUS TISSUE: Status: ACTIVE | Noted: 2022-11-07

## 2022-11-07 PROCEDURE — 17003 DESTRUCT PREMALG LES 2-14: CPT

## 2022-11-07 PROCEDURE — OTHER PRESCRIPTION: OTHER

## 2022-11-07 PROCEDURE — 99214 OFFICE O/P EST MOD 30 MIN: CPT | Mod: 25

## 2022-11-07 PROCEDURE — OTHER REASSURANCE: OTHER

## 2022-11-07 PROCEDURE — OTHER MIPS QUALITY: OTHER

## 2022-11-07 PROCEDURE — OTHER LIQUID NITROGEN: OTHER

## 2022-11-07 PROCEDURE — 17000 DESTRUCT PREMALG LESION: CPT

## 2022-11-07 PROCEDURE — OTHER PRESCRIPTION MEDICATION MANAGEMENT: OTHER

## 2022-11-07 PROCEDURE — OTHER COUNSELING: OTHER

## 2022-11-07 RX ORDER — DOXYCYCLINE HYCLATE 100 MG/1
100MG TABLET, COATED ORAL BID
Qty: 60 | Refills: 2 | Status: ERX | COMMUNITY
Start: 2022-11-07

## 2022-11-07 ASSESSMENT — LOCATION DETAILED DESCRIPTION DERM
LOCATION DETAILED: RIGHT MEDIAL MALAR CHEEK
LOCATION DETAILED: RIGHT CENTRAL MALAR CHEEK
LOCATION DETAILED: LEFT INFERIOR PREAURICULAR CHEEK
LOCATION DETAILED: RIGHT SCAPHA
LOCATION DETAILED: LEFT INFERIOR OCCIPITAL SCALP
LOCATION DETAILED: INFERIOR THORACIC SPINE
LOCATION DETAILED: LEFT MEDIAL UPPER BACK
LOCATION DETAILED: LEFT INFERIOR MEDIAL UPPER BACK
LOCATION DETAILED: RIGHT SUPERIOR MEDIAL UPPER BACK
LOCATION DETAILED: RIGHT SUPERIOR PARIETAL SCALP
LOCATION DETAILED: LEFT NASAL DORSUM

## 2022-11-07 ASSESSMENT — LOCATION SIMPLE DESCRIPTION DERM
LOCATION SIMPLE: NOSE
LOCATION SIMPLE: UPPER BACK
LOCATION SIMPLE: LEFT CHEEK
LOCATION SIMPLE: RIGHT CHEEK
LOCATION SIMPLE: LEFT UPPER BACK
LOCATION SIMPLE: SCALP
LOCATION SIMPLE: RIGHT EAR
LOCATION SIMPLE: RIGHT UPPER BACK

## 2022-11-07 ASSESSMENT — LOCATION ZONE DERM
LOCATION ZONE: NOSE
LOCATION ZONE: FACE
LOCATION ZONE: SCALP
LOCATION ZONE: TRUNK
LOCATION ZONE: EAR

## 2022-11-07 NOTE — PROCEDURE: LIQUID NITROGEN
Consent: - Discussed that these are a result of cumulative sun exposure.\\n- Verbal and written consent was obtained, and risks were reviewed prior to procedure today. \\n- Risks discussed include but are not limited to pain, crusting, scabbing, blistering, scarring, temporary or permanent darker or lighter pigmentary change, recurrence, incomplete resolution, and infection.
Render Note In Bullet Format When Appropriate: Yes
Post-Care Instructions: - Patient was instructed to avoid picking at any of the treated lesions.
Detail Level: Detailed
Duration Of Freeze Thaw-Cycle (Seconds): 5
Number Of Freeze-Thaw Cycles: 2 freeze-thaw cycles

## 2022-11-07 NOTE — PROCEDURE: COUNSELING
Patient Specific Counseling (Will Not Stick From Patient To Patient): Patient has been using Minocycline for many many years and is experiencing some discoloration on both shins.
Detail Level: Detailed
Detail Level: Generalized

## 2022-11-07 NOTE — PROCEDURE: MIPS QUALITY
Quality 226: Preventive Care And Screening: Tobacco Use: Screening And Cessation Intervention: Patient screened for tobacco use and is an ex/non-smoker
Quality 110: Preventive Care And Screening: Influenza Immunization: Influenza Immunization Ordered or Recommended, but not Administered due to system reason
Quality 431: Preventive Care And Screening: Unhealthy Alcohol Use - Screening: Patient not identified as an unhealthy alcohol user when screened for unhealthy alcohol use using a systematic screening method
Quality 130: Documentation Of Current Medications In The Medical Record: Current Medications Documented
Detail Level: Generalized

## 2022-11-07 NOTE — PROCEDURE: PRESCRIPTION MEDICATION MANAGEMENT
Render In Strict Bullet Format?: No
Plan: See Acne impression for further information
Discontinue Regimen: Minocycline due to discoloration on legs
Initiate Treatment: Doxycycline 100mg BID
Detail Level: Zone
Initiate Treatment: Doxycycline
Discontinue Regimen: Minocycline

## 2022-12-26 ENCOUNTER — HEALTH MAINTENANCE LETTER (OUTPATIENT)
Age: 62
End: 2022-12-26

## 2022-12-30 DIAGNOSIS — I10 BENIGN ESSENTIAL HYPERTENSION: ICD-10-CM

## 2022-12-30 DIAGNOSIS — E78.5 HYPERLIPIDEMIA LDL GOAL <70: ICD-10-CM

## 2022-12-30 DIAGNOSIS — I25.10 CORONARY ARTERY DISEASE INVOLVING NATIVE CORONARY ARTERY OF NATIVE HEART WITHOUT ANGINA PECTORIS: ICD-10-CM

## 2022-12-30 RX ORDER — LOSARTAN POTASSIUM AND HYDROCHLOROTHIAZIDE 25; 100 MG/1; MG/1
1 TABLET ORAL DAILY
Qty: 90 TABLET | Refills: 0 | Status: SHIPPED | OUTPATIENT
Start: 2022-12-30 | End: 2023-03-30

## 2023-02-20 ENCOUNTER — LAB (OUTPATIENT)
Dept: LAB | Facility: CLINIC | Age: 63
End: 2023-02-20
Payer: COMMERCIAL

## 2023-02-20 DIAGNOSIS — I25.10 CORONARY ARTERY DISEASE INVOLVING NATIVE CORONARY ARTERY OF NATIVE HEART WITHOUT ANGINA PECTORIS: ICD-10-CM

## 2023-02-20 LAB
ALT SERPL W P-5'-P-CCNC: 29 U/L (ref 10–50)
CHOLEST SERPL-MCNC: 171 MG/DL
HDLC SERPL-MCNC: 86 MG/DL
LDLC SERPL CALC-MCNC: 72 MG/DL
NONHDLC SERPL-MCNC: 85 MG/DL
TRIGL SERPL-MCNC: 63 MG/DL

## 2023-02-20 PROCEDURE — 84460 ALANINE AMINO (ALT) (SGPT): CPT | Performed by: INTERNAL MEDICINE

## 2023-02-20 PROCEDURE — 36415 COLL VENOUS BLD VENIPUNCTURE: CPT | Performed by: INTERNAL MEDICINE

## 2023-02-20 PROCEDURE — 80061 LIPID PANEL: CPT | Performed by: INTERNAL MEDICINE

## 2023-02-21 ENCOUNTER — OFFICE VISIT (OUTPATIENT)
Dept: CARDIOLOGY | Facility: CLINIC | Age: 63
End: 2023-02-21
Payer: COMMERCIAL

## 2023-02-21 VITALS
WEIGHT: 190 LBS | DIASTOLIC BLOOD PRESSURE: 82 MMHG | OXYGEN SATURATION: 99 % | HEART RATE: 80 BPM | HEIGHT: 71 IN | SYSTOLIC BLOOD PRESSURE: 138 MMHG | BODY MASS INDEX: 26.6 KG/M2

## 2023-02-21 DIAGNOSIS — I25.118 CORONARY ARTERY DISEASE OF NATIVE ARTERY OF NATIVE HEART WITH STABLE ANGINA PECTORIS (H): ICD-10-CM

## 2023-02-21 DIAGNOSIS — I25.10 CORONARY ARTERY DISEASE INVOLVING NATIVE CORONARY ARTERY OF NATIVE HEART WITHOUT ANGINA PECTORIS: Primary | ICD-10-CM

## 2023-02-21 DIAGNOSIS — R07.89 ATYPICAL CHEST PAIN: ICD-10-CM

## 2023-02-21 DIAGNOSIS — I25.5 ISCHEMIC CARDIOMYOPATHY: ICD-10-CM

## 2023-02-21 DIAGNOSIS — I77.810 AORTIC ROOT DILATION (H): ICD-10-CM

## 2023-02-21 DIAGNOSIS — E78.00 PURE HYPERCHOLESTEROLEMIA: ICD-10-CM

## 2023-02-21 DIAGNOSIS — I10 BENIGN ESSENTIAL HYPERTENSION: ICD-10-CM

## 2023-02-21 PROCEDURE — 99214 OFFICE O/P EST MOD 30 MIN: CPT | Performed by: INTERNAL MEDICINE

## 2023-02-21 RX ORDER — ATORVASTATIN CALCIUM 20 MG/1
20 TABLET, FILM COATED ORAL DAILY
Qty: 90 TABLET | Refills: 3 | Status: SHIPPED | OUTPATIENT
Start: 2023-02-21

## 2023-02-21 RX ORDER — DOXYCYCLINE HYCLATE 100 MG
100 TABLET ORAL 2 TIMES DAILY
COMMUNITY

## 2023-02-21 NOTE — PROGRESS NOTES
HPI and Plan:   See dictation    Today's clinic visit entailed:  Review of the result(s) of each unique test - Lab work, stress echocardiogram  Ordering of each unique test  Prescription drug management  38 minutes spent on the date of the encounter doing chart review, history and exam, documentation and further activities per the note  Provider  Link to MDM Help Grid     The level of medical decision making during this visit was of moderate complexity.      Orders Placed This Encounter   Procedures     Lipid Profile     ALT     Lipid Profile     ALT     Basic metabolic panel     Follow-Up with Cardiology GERONIMO     Follow-Up with Cardiology GERONIMO     Follow-Up with Cardiology       Orders Placed This Encounter   Medications     doxycycline hyclate (VIBRA-TABS) 100 MG tablet     Sig: Take 100 mg by mouth 2 times daily Taking as needed     atorvastatin (LIPITOR) 20 MG tablet     Sig: Take 1 tablet (20 mg) by mouth daily     Dispense:  90 tablet     Refill:  3       Medications Discontinued During This Encounter   Medication Reason     atorvastatin (LIPITOR) 10 MG tablet          Encounter Diagnoses   Name Primary?     Coronary artery disease involving native coronary artery of native heart without angina pectoris Yes     Pure hypercholesterolemia      Coronary artery disease of native artery of native heart with stable angina pectoris (H)      Benign essential hypertension      Ischemic cardiomyopathy      Aortic root dilation (H)      Atypical chest pain        CURRENT MEDICATIONS:  Current Outpatient Medications   Medication Sig Dispense Refill     acetaminophen (TYLENOL) 325 MG tablet Take 2 tablets (650 mg) by mouth every 4 hours as needed for mild pain or headaches       atorvastatin (LIPITOR) 20 MG tablet Take 1 tablet (20 mg) by mouth daily 90 tablet 3     clopidogrel (PLAVIX) 75 MG tablet Take 4 tablets (300mg) by mouth the first day and then 1 tablet (75mg) by mouth daily thereafter. 90 tablet 3     doxycycline  hyclate (VIBRA-TABS) 100 MG tablet Take 100 mg by mouth 2 times daily Taking as needed       Flaxseed, Linseed, (FLAX SEED OIL) 1000 MG capsule Take 1 capsule by mouth daily       glucosamine-chondroitin 500-400 MG CAPS Take 2 capsules by mouth daily        hydrocortisone (WESTCORT) 0.2 % cream Apply topically 2 times daily as needed        loratadine 10 MG capsule Take 10 mg by mouth daily       losartan-hydrochlorothiazide (HYZAAR) 100-25 MG tablet Take 1 tablet by mouth daily 90 tablet 0     Lysine 500 MG TABS Take 1 tablet by mouth daily.       magnesium 500 MG TABS Take 500 mg by mouth every evening       Multiple Vitamin (MULTI-VITAMIN) per tablet Take 1 tablet by mouth daily.       nitroGLYcerin (NITROSTAT) 0.4 MG sublingual tablet Place 1 tablet (0.4 mg) under the tongue every 5 minutes as needed for chest pain Administer every 5 minutes as needed.  Maximum 3 doses in 15 minutes. 30 tablet 0     Potassium Gluconate 595 (99 K) MG TABS        ramelteon (ROZEREM) 8 MG tablet        sildenafil (VIAGRA) 50 MG tablet TAKE 1 TABLET BEFORE SEXUAL ACTIVITY. MAX 100MG/24HRS       zinc gluconate 50 MG tablet        MINOCYCLINE HCL PO Take 100 mg by mouth daily as needed (acne)  (Patient not taking: Reported on 2/21/2023)         ALLERGIES     Allergies   Allergen Reactions     Seasonal Allergies      Crestor [Rosuvastatin] Muscle Pain (Myalgia)     Hydrocodone-Acetaminophen Other (See Comments)     FEELS WIRED  FEELS WIRED  Hyperactive, insomnia.        Molds & Smuts        PAST MEDICAL HISTORY:  Past Medical History:   Diagnosis Date     Adenoma of ascending colon      Aortic root dilation (H)      Chest pain      Coronary artery disease     5/2012: Stent to proximal LAD for unstable angina. Proximal left circumflex 30-40%. EF 60%     HTN (hypertension)      Hyperlipidaemia      Iliac artery aneurysm, left (H)        PAST SURGICAL HISTORY:  Past Surgical History:   Procedure Laterality Date     APPENDECTOMY OPEN        ARTHROSCOPY KNEE BILATERAL      both knees     ARTHROSCOPY SHOULDER      left     CARDIAC SURGERY      stent x 1     COLONOSCOPY N/A 2/17/2017    Procedure: COLONOSCOPY;  Surgeon: Junie Grier MD;  Location:  OR     COLONOSCOPY  12/2016    [place] [facility] [place]     COLONOSCOPY  05/22/2017    Dr. Grier [facility]     COLONOSCOPY N/A 5/29/2018    Procedure: COLONOSCOPY;  colonoscopy (crsal);  Surgeon: Junie Grier MD;  Location:  GI     CV HEART CATHETERIZATION WITH POSSIBLE INTERVENTION N/A 7/28/2021    Procedure: Heart Catheterization with Possible Intervention;  Surgeon: Buddy Martin MD;  Location:  HEART CARDIAC CATH LAB     CV INTRAVASULAR ULTRASOUND N/A 7/30/2021    Procedure: Intravascular Ultrasound;  Surgeon: Mitchell Morales MD;  Location:  HEART CARDIAC CATH LAB     CV PCI STENT DRUG ELUTING N/A 7/30/2021    Procedure: Percutaneous Coronary Intervention Stent Drug Eluting;  Surgeon: Mitchell Morales MD;  Location:  HEART CARDIAC CATH LAB     LAPAROSCOPIC HERNIORRHAPHY INGUINAL  12/30/2013    Procedure: LAPAROSCOPIC HERNIORRHAPHY INGUINAL;  LAPAROSCOPIC RIGHT INGUINAL HERNIA REPAIR WITH MESH;  Surgeon: Reji Pizano MD;  Location: [facility]     ORTHOPEDIC SURGERY      scope of both shoulders     VASECTOMY       ZZC ORAL SURGERY PROCEDURE         FAMILY HISTORY:  Family History   Problem Relation Age of Onset     Breast Cancer Mother      Cerebrovascular Disease Father      Breast Cancer Maternal Grandmother      Breast Cancer Son      Colon Cancer No family hx of        SOCIAL HISTORY:  Social History     Socioeconomic History     Marital status:      Spouse name: None     Number of children: None     Years of education: None     Highest education level: None   Tobacco Use     Smoking status: Never     Smokeless tobacco: Never   Vaping Use     Vaping Use: Never used   Substance and Sexual Activity     Alcohol use: Yes     Comment: 1 glass of wine occ  "    Drug use: No       Review of Systems:  Skin:  Negative       Eyes:  Positive for glasses    ENT:  Negative      Respiratory:  Negative       Cardiovascular:    Positive for;chest pain having CP at least once monthly  Gastroenterology: Negative      Genitourinary:  Negative      Musculoskeletal:  Negative      Neurologic:  Negative      Psychiatric:  Negative      Heme/Lymph/Imm:  Positive for allergies    Endocrine:  Negative        Physical Exam:  Vitals: /82   Pulse 80   Ht 1.803 m (5' 11\")   Wt 86.2 kg (190 lb)   SpO2 99%   BMI 26.50 kg/m      Constitutional:  cooperative, alert and oriented, well developed, well nourished, in no acute distress   fit appearing    Skin:  warm and dry to the touch, no apparent skin lesions or masses noted          Head:  normocephalic, no masses or lesions        Eyes:  pupils equal and round, conjunctivae and lids unremarkable, sclera white, no xanthalasma, EOMS intact, no nystagmus        Lymph:      ENT:  no pallor or cyanosis, dentition good        Neck:  carotid pulses are full and equal bilaterally;no carotid bruit        Respiratory:  normal breath sounds, clear to auscultation, normal A-P diameter, normal symmetry, normal respiratory excursion, no use of accessory muscles         Cardiac: regular rhythm;normal S1 and S2       systolic ejection murmur;LUSB;grade 2        pulses full and equal                                        GI:           Extremities and Muscular Skeletal:  no edema;no spinal abnormalities noted;normal muscle strength and tone              Neurological:  no gross motor deficits        Psych:  affect appropriate, oriented to time, person and place        CC  Jan Park MD  [address]  [place] [id number]              "

## 2023-02-21 NOTE — LETTER
2/21/2023    Bharathi Chowdary MD  [employer] [address] [facility] [address]  [facility] [address]    RE: Chris Barriga       Dear Colleague,     I had the pleasure of seeing Chris Barriga in the [facility].  HPI and Plan:   See dictation    Today's clinic visit entailed:  Review of the result(s) of each unique test - Lab work, stress echocardiogram  Ordering of each unique test  Prescription drug management  38 minutes spent on the date of the encounter doing chart review, history and exam, documentation and further activities per the note  Provider  Link to MDM Help Grid     The level of medical decision making during this visit was of moderate complexity.      Orders Placed This Encounter   Procedures     Lipid Profile     ALT     Lipid Profile     ALT     Basic metabolic panel     Follow-Up with Cardiology GERONIMO     Follow-Up with Cardiology GERONIMO     Follow-Up with Cardiology       Orders Placed This Encounter   Medications     doxycycline hyclate (VIBRA-TABS) 100 MG tablet     Sig: Take 100 mg by mouth 2 times daily Taking as needed     atorvastatin (LIPITOR) 20 MG tablet     Sig: Take 1 tablet (20 mg) by mouth daily     Dispense:  90 tablet     Refill:  3       Medications Discontinued During This Encounter   Medication Reason     atorvastatin (LIPITOR) 10 MG tablet          Encounter Diagnoses   Name Primary?     Coronary artery disease involving native coronary artery of native heart without angina pectoris Yes     Pure hypercholesterolemia      Coronary artery disease of native artery of native heart with stable angina pectoris (H)      Benign essential hypertension      Ischemic cardiomyopathy      Aortic root dilation (H)      Atypical chest pain        CURRENT MEDICATIONS:  Current Outpatient Medications   Medication Sig Dispense Refill     acetaminophen (TYLENOL) 325 MG tablet Take 2 tablets (650 mg) by mouth every 4 hours as needed for mild pain or headaches       atorvastatin  (LIPITOR) 20 MG tablet Take 1 tablet (20 mg) by mouth daily 90 tablet 3     clopidogrel (PLAVIX) 75 MG tablet Take 4 tablets (300mg) by mouth the first day and then 1 tablet (75mg) by mouth daily thereafter. 90 tablet 3     doxycycline hyclate (VIBRA-TABS) 100 MG tablet Take 100 mg by mouth 2 times daily Taking as needed       Flaxseed, Linseed, (FLAX SEED OIL) 1000 MG capsule Take 1 capsule by mouth daily       glucosamine-chondroitin 500-400 MG CAPS Take 2 capsules by mouth daily        hydrocortisone (WESTCORT) 0.2 % cream Apply topically 2 times daily as needed        loratadine 10 MG capsule Take 10 mg by mouth daily       losartan-hydrochlorothiazide (HYZAAR) 100-25 MG tablet Take 1 tablet by mouth daily 90 tablet 0     Lysine 500 MG TABS Take 1 tablet by mouth daily.       magnesium 500 MG TABS Take 500 mg by mouth every evening       Multiple Vitamin (MULTI-VITAMIN) per tablet Take 1 tablet by mouth daily.       nitroGLYcerin (NITROSTAT) 0.4 MG sublingual tablet Place 1 tablet (0.4 mg) under the tongue every 5 minutes as needed for chest pain Administer every 5 minutes as needed.  Maximum 3 doses in 15 minutes. 30 tablet 0     Potassium Gluconate 595 (99 K) MG TABS        ramelteon (ROZEREM) 8 MG tablet        sildenafil (VIAGRA) 50 MG tablet TAKE 1 TABLET BEFORE SEXUAL ACTIVITY. MAX 100MG/24HRS       zinc gluconate 50 MG tablet        MINOCYCLINE HCL PO Take 100 mg by mouth daily as needed (acne)  (Patient not taking: Reported on 2/21/2023)         ALLERGIES     Allergies   Allergen Reactions     Seasonal Allergies      Crestor [Rosuvastatin] Muscle Pain (Myalgia)     Hydrocodone-Acetaminophen Other (See Comments)     FEELS WIRED  FEELS WIRED  Hyperactive, insomnia.        Molds & Smuts        PAST MEDICAL HISTORY:  Past Medical History:   Diagnosis Date     Adenoma of ascending colon      Aortic root dilation (H)      Chest pain      Coronary artery disease     5/2012: Stent to proximal LAD for unstable  angina. Proximal left circumflex 30-40%. EF 60%     HTN (hypertension)      Hyperlipidaemia      Iliac artery aneurysm, left (H)        PAST SURGICAL HISTORY:  Past Surgical History:   Procedure Laterality Date     APPENDECTOMY OPEN       ARTHROSCOPY KNEE BILATERAL      both knees     ARTHROSCOPY SHOULDER      left     CARDIAC SURGERY      stent x 1     COLONOSCOPY N/A 2/17/2017    Procedure: COLONOSCOPY;  Surgeon: Junie Grier MD;  Location:  OR     COLONOSCOPY  12/2016    [facility] [place]     COLONOSCOPY  05/22/2017    Dr. Grier [facility]     COLONOSCOPY N/A 5/29/2018    Procedure: COLONOSCOPY;  colonoscopy (crsal);  Surgeon: Junie Grier MD;  Location:  GI     CV HEART CATHETERIZATION WITH POSSIBLE INTERVENTION N/A 7/28/2021    Procedure: Heart Catheterization with Possible Intervention;  Surgeon: Buddy Martin MD;  Location:  HEART CARDIAC CATH LAB     CV INTRAVASULAR ULTRASOUND N/A 7/30/2021    Procedure: Intravascular Ultrasound;  Surgeon: Mitchell Morales MD;  Location:  HEART CARDIAC CATH LAB     CV PCI STENT DRUG ELUTING N/A 7/30/2021    Procedure: Percutaneous Coronary Intervention Stent Drug Eluting;  Surgeon: Mitchell Morales MD;  Location:  HEART CARDIAC CATH LAB     LAPAROSCOPIC HERNIORRHAPHY INGUINAL  12/30/2013    Procedure: LAPAROSCOPIC HERNIORRHAPHY INGUINAL;  LAPAROSCOPIC RIGHT INGUINAL HERNIA REPAIR WITH MESH;  Surgeon: Reji Pizano MD;  Location: [facility]     ORTHOPEDIC SURGERY      scope of both shoulders     VASECTOMY       ZZC ORAL SURGERY PROCEDURE         FAMILY HISTORY:  Family History   Problem Relation Age of Onset     Breast Cancer Mother      Cerebrovascular Disease Father      Breast Cancer Maternal Grandmother      Breast Cancer Son      Colon Cancer No family hx of        SOCIAL HISTORY:  Social History     Socioeconomic History     Marital status:      Spouse name: None     Number of children: None     Years of  "education: None     Highest education level: None   Tobacco Use     Smoking status: Never     Smokeless tobacco: Never   Vaping Use     Vaping Use: Never used   Substance and Sexual Activity     Alcohol use: Yes     Comment: 1 glass of wine occ     Drug use: No       Review of Systems:  Skin:  Negative       Eyes:  Positive for glasses    ENT:  Negative      Respiratory:  Negative       Cardiovascular:    Positive for;chest pain having CP at least once monthly  Gastroenterology: Negative      Genitourinary:  Negative      Musculoskeletal:  Negative      Neurologic:  Negative      Psychiatric:  Negative      Heme/Lymph/Imm:  Positive for allergies    Endocrine:  Negative        Physical Exam:  Vitals: /82   Pulse 80   Ht 1.803 m (5' 11\")   Wt 86.2 kg (190 lb)   SpO2 99%   BMI 26.50 kg/m      Constitutional:  cooperative, alert and oriented, well developed, well nourished, in no acute distress   fit appearing    Skin:  warm and dry to the touch, no apparent skin lesions or masses noted          Head:  normocephalic, no masses or lesions        Eyes:  pupils equal and round, conjunctivae and lids unremarkable, sclera white, no xanthalasma, EOMS intact, no nystagmus        Lymph:      ENT:  no pallor or cyanosis, dentition good        Neck:  carotid pulses are full and equal bilaterally;no carotid bruit        Respiratory:  normal breath sounds, clear to auscultation, normal A-P diameter, normal symmetry, normal respiratory excursion, no use of accessory muscles         Cardiac: regular rhythm;normal S1 and S2       systolic ejection murmur;LUSB;grade 2        pulses full and equal                                        GI:           Extremities and Muscular Skeletal:  no edema;no spinal abnormalities noted;normal muscle strength and tone              Neurological:  no gross motor deficits        Psych:  affect appropriate, oriented to time, person and place        CC  Jan Park MD  [address]  [place] [id number]                Service Date: 02/21/2023    HISTORY OF PRESENT ILLNESS:  Chava is a very nice 62-year-old gentleman, who appears younger than his stated age and appears to be very fit individual.    Past medical history is significant for coronary artery disease, hypercholesterolemia, mildly dilated ascending aorta and ischemic cardiomyopathy.    I met him in 2017 as he wanted to establish with Cardiology and was having some side effects with statins.  His coronary artery history dates back to stenting of his proximal left anterior descending artery for unstable angina in 2012.  In 07/2020, again presented with unstable angina.  He underwent complex stenting of a very angulated first obtuse marginal, requiring stents in the obtuse marginal, proximal and mid circumflex coronary artery.  This did result in some plaque shift into his proximal left anterior descending artery.  Nonetheless, this resulted in complete resolution of his symptoms.  The patient did not tolerate metoprolol because of fatigue, tiredness and lack of energy.  He did seek a second opinion at [facility] to review his angiogram after his complex procedure of July.    Chava returns to clinic stating overall he thinks he is doing better.  He is still haunted by a chest discomfort that occurs about every 4-6 weeks.  As he stated, it is a vague chest discomfort.  It does not occur when he works out, which he does quite regularly, and he states in the past, he has noted it occurs when he is tired or fatigued.  He calls it a twinge.  He states it may last up to 10 or 15 minutes.  He occasionally take nitroglycerin for it, which it does respond to nitroglycerin.  He is now retired and did have a stressful job.  He also thinks it may be related to how well he sleeps at nighttime.    He works out, doing some sort of aerobic activity every day of the week, and then he will lift weights 2-3 days a week and, as stated, this usually  does not cause him any problems whatsoever.  He has no lightheadedness, dizziness, syncope or near syncope.  He has no palpitations.  He notes no side effects or problems with his current medical regimen.  He was having problems with rosuvastatin interfering with his sleep, which he has had a couple of times.  He has now been switched to atorvastatin 10, and it is not interfering with his sleep.    ASSESSMENT AND PLAN:  Chava has what appears to be a chronic stable angina pattern; it is not necessarily exertional.  We did do a stress test on him a year ago for which he was able to go 13 minutes of the standard Frantz protocol and appeared to be completely normal.  I suspect he may occasionally be having spasm and that we will continue with taking nitroglycerin on a p.r.n. basis.  I have told him if it should change, increase in frequency, duration, be triggered by exercise, then we need to see him and we would probably run a stress test, possibly repeat his angiogram.  But at this point, he states it has been running this pattern ever since his intervention and has not changed.    He has no symptoms to suggest heart failure or significant arrhythmia.    Blood pressure was high when he came in today.  He definitely has white coat hypertension, although on my recheck, I get 138/82.  He states at home, he typically is going to get 120/80.    Fasting lipid profile is a bit of a backslide on 10 mg of atorvastatin.  Total cholesterol is 171, HDL is 86, LDL is 72, triglycerides are 63.  We talked about the most recent guidelines to get under 55.  We talked about the different parameters.  At this time, we will increase his atorvastatin to 20 mg daily.  I will have him follow up with my GERONIMO in 1 month and we will see what his fasting lipid profile is on the higher dose.  If he does not tolerate this, I may switch him to pravastatin, but I would like to get his LDL below 50.  We will also follow up to see how his chest  discomfort is doing.    He has got a normal creatinine and normal electrolytes.  I will have him follow up with my GERONIMO in 6 months.  I will see him back in a year, in addition to his follow-up in 1 month.    Jan Park MD, [facility]        D: 2023   T: 2023   MT: al    Name:     NGA RIOS  MRN:      [id number]        Account:      [id number]   :      1960           Service Date: 2023       Document: [id number]    Thank you for allowing me to participate in the care of your patient.      Sincerely,     Jan Park MD   [facility]  cc:   Jan Park MD  [address] [place] [id number]

## 2023-03-30 DIAGNOSIS — E78.5 HYPERLIPIDEMIA LDL GOAL <70: ICD-10-CM

## 2023-03-30 DIAGNOSIS — I25.10 CORONARY ARTERY DISEASE INVOLVING NATIVE CORONARY ARTERY OF NATIVE HEART WITHOUT ANGINA PECTORIS: ICD-10-CM

## 2023-03-30 DIAGNOSIS — I10 BENIGN ESSENTIAL HYPERTENSION: ICD-10-CM

## 2023-03-30 RX ORDER — LOSARTAN POTASSIUM AND HYDROCHLOROTHIAZIDE 25; 100 MG/1; MG/1
1 TABLET ORAL DAILY
Qty: 90 TABLET | Refills: 4 | Status: SHIPPED | OUTPATIENT
Start: 2023-03-30

## 2023-03-30 NOTE — PROGRESS NOTES
Primary Cardiologist: Dr. Park    Reason for Visit: 1 month follow up after increase in atorvastatin with fasting lipid panel    History of Present Illness:   Chava is a very pleasant 62-year-old male with past medical history notable for coronary artery disease (proximal LAD artery stenting in 2012; complex stenting of very angulated OM1 marginal requiring stents to OM, proximal and mid LCX arteries in 7/2020), hypertension, hyperlipidemia, and mild aortic root dilatation.    During his recent visit with Dr. Park he had a backslide in his cholesterol numbers and therefore his atorvastatin was increased from 10 mg to 20 mg daily. He returns to clinic today for follow up. Plan was to consider pravastatin if he had intolerance to increased dose of atorvastatin. Of note, he did not tolerate rosuvastatin before. He had vivid dreams and insomnia with it. He is feeling better on atorvastatin with no side effects.     Assessment and Plan:  Chava is a very pleasant 62-year-old male with past medical history notable for coronary artery disease (proximal LAD artery stenting in 2012; complex stenting of very angulated OM1 marginal requiring stents to OM, proximal and mid LCX arteries in 7/2020), hypertension, hyperlipidemia, and mild aortic root dilatation.    Chava appears to be doing well from a cardiac standpoint. We will continue with current regimen. His lipid panel is pending at this time but ALT is normal. If his numbers continue to be suboptimal, we will consider addition of ezetimibe to his regimen.       This note was completed in part using Dragon voice recognition software. Although reviewed after completion, some word and grammatical errors may occur.    Orders this Visit:  No orders of the defined types were placed in this encounter.    No orders of the defined types were placed in this encounter.    There are no discontinued medications.      No diagnosis found.    CURRENT MEDICATIONS:  Current Outpatient  Medications   Medication Sig Dispense Refill     acetaminophen (TYLENOL) 325 MG tablet Take 2 tablets (650 mg) by mouth every 4 hours as needed for mild pain or headaches       atorvastatin (LIPITOR) 20 MG tablet Take 1 tablet (20 mg) by mouth daily 90 tablet 3     clopidogrel (PLAVIX) 75 MG tablet Take 4 tablets (300mg) by mouth the first day and then 1 tablet (75mg) by mouth daily thereafter. 90 tablet 3     doxycycline hyclate (VIBRA-TABS) 100 MG tablet Take 100 mg by mouth 2 times daily Taking as needed       Flaxseed, Linseed, (FLAX SEED OIL) 1000 MG capsule Take 1 capsule by mouth daily       glucosamine-chondroitin 500-400 MG CAPS Take 2 capsules by mouth daily        hydrocortisone (WESTCORT) 0.2 % cream Apply topically 2 times daily as needed        loratadine 10 MG capsule Take 10 mg by mouth daily       losartan-hydrochlorothiazide (HYZAAR) 100-25 MG tablet Take 1 tablet by mouth daily 90 tablet 4     Lysine 500 MG TABS Take 1 tablet by mouth daily.       magnesium 500 MG TABS Take 500 mg by mouth every evening       MINOCYCLINE HCL PO Take 100 mg by mouth daily as needed (acne)  (Patient not taking: Reported on 2/21/2023)       Multiple Vitamin (MULTI-VITAMIN) per tablet Take 1 tablet by mouth daily.       nitroGLYcerin (NITROSTAT) 0.4 MG sublingual tablet Place 1 tablet (0.4 mg) under the tongue every 5 minutes as needed for chest pain Administer every 5 minutes as needed.  Maximum 3 doses in 15 minutes. 30 tablet 0     Potassium Gluconate 595 (99 K) MG TABS        ramelteon (ROZEREM) 8 MG tablet        sildenafil (VIAGRA) 50 MG tablet TAKE 1 TABLET BEFORE SEXUAL ACTIVITY. MAX 100MG/24HRS       zinc gluconate 50 MG tablet          ALLERGIES     Allergies   Allergen Reactions     Seasonal Allergies      Crestor [Rosuvastatin] Muscle Pain (Myalgia)     Hydrocodone-Acetaminophen Other (See Comments)     FEELS WIRED  FEELS WIRED  Hyperactive, insomnia.        Molds & Smuts        PAST MEDICAL HISTORY:  Past  Medical History:   Diagnosis Date     Adenoma of ascending colon      Aortic root dilation (H)      Chest pain      Coronary artery disease     5/2012: Stent to proximal LAD for unstable angina. Proximal left circumflex 30-40%. EF 60%     HTN (hypertension)      Hyperlipidaemia      Iliac artery aneurysm, left (H)        PAST SURGICAL HISTORY:  Past Surgical History:   Procedure Laterality Date     APPENDECTOMY OPEN       ARTHROSCOPY KNEE BILATERAL      both knees     ARTHROSCOPY SHOULDER      left     CARDIAC SURGERY      stent x 1     COLONOSCOPY N/A 2/17/2017    Procedure: COLONOSCOPY;  Surgeon: Junie Grier MD;  Location:  OR     [facility]  [facility] [place]     COLONOSCOPY  05/22/2017    Dr. Grier [facility]     COLONOSCOPY N/A 5/29/2018    Procedure: COLONOSCOPY;  colonoscopy (crsal);  Surgeon: Junie Grier MD;  Location:  GI     CV HEART CATHETERIZATION WITH POSSIBLE INTERVENTION N/A 7/28/2021    Procedure: Heart Catheterization with Possible Intervention;  Surgeon: Buddy Martin MD;  Location:  HEART CARDIAC CATH LAB     CV INTRAVASULAR ULTRASOUND N/A 7/30/2021    Procedure: Intravascular Ultrasound;  Surgeon: Mitchell Morales MD;  Location:  HEART CARDIAC CATH LAB     CV PCI STENT DRUG ELUTING N/A 7/30/2021    Procedure: Percutaneous Coronary Intervention Stent Drug Eluting;  Surgeon: Mitchell Morales MD;  Location:  HEART CARDIAC CATH LAB     LAPAROSCOPIC HERNIORRHAPHY INGUINAL  12/30/2013    Procedure: LAPAROSCOPIC HERNIORRHAPHY INGUINAL;  LAPAROSCOPIC RIGHT INGUINAL HERNIA REPAIR WITH MESH;  Surgeon: Reji Pizano MD;  Location: [facility]     ORTHOPEDIC SURGERY      scope of both shoulders     VASECTOMY       ZZC ORAL SURGERY PROCEDURE         FAMILY HISTORY:  Family History   Problem Relation Age of Onset     Breast Cancer Mother      Cerebrovascular Disease Father      Breast Cancer Maternal Grandmother      Breast Cancer Son      Colon  Cancer No family hx of        SOCIAL HISTORY:  Social History     Socioeconomic History     Marital status:    Tobacco Use     Smoking status: Never     Smokeless tobacco: Never   Vaping Use     Vaping Use: Never used   Substance and Sexual Activity     Alcohol use: Yes     Comment: 1 glass of wine occ     Drug use: No       Review of Systems:  Skin:        Eyes:       ENT:       Respiratory:       Cardiovascular:       Gastroenterology:      Genitourinary:       Musculoskeletal:       Neurologic:       Psychiatric:       Heme/Lymph/Imm:       Endocrine:         Physical Exam:  Vitals: There were no vitals taken for this visit.     GEN:  NAD  NECK: No JVD  C/V:  Regular rate and rhythm, no murmur, rub or gallop.  RESP: Clear to auscultation bilaterally.  GI: Abdomen soft, nontender, nondistended.   EXTREM: No pitting LE edema.   NEURO: Alert and oriented, cooperative. No obvious focal deficits.   PSYCH: Normal affect.  SKIN: Warm and dry.       Recent Lab Results:  LIPID RESULTS:  Lab Results   Component Value Date    CHOL 171 02/20/2023    CHOL 147 01/21/2021    HDL 86 02/20/2023    HDL 91 01/21/2021    LDL 72 02/20/2023    LDL 35 01/21/2021    TRIG 63 02/20/2023    TRIG 107 01/21/2021    CHOLHDLRATIO 1.7 08/20/2012       LIVER ENZYME RESULTS:  Lab Results   Component Value Date    AST 21 09/04/2018    ALT 29 02/20/2023    ALT 24 09/17/2020       CBC RESULTS:  Lab Results   Component Value Date    WBC 5.9 07/30/2021    WBC 5.5 08/21/2017    RBC 4.37 (L) 07/30/2021    RBC 4.81 08/21/2017    HGB 14.4 07/30/2021    HGB 15.4 08/21/2017    HCT 41.8 07/30/2021    HCT 44.3 08/21/2017    MCV 96 07/30/2021    MCV 92 08/21/2017    MCH 33.0 07/30/2021    MCH 32 08/21/2017    MCHC 34.4 07/30/2021    MCHC 34.8 08/21/2017    RDW 13.2 07/30/2021    RDW 13 08/21/2017     07/30/2021     08/21/2017       BMP RESULTS:  Lab Results   Component Value Date     03/01/2022     01/21/2021    POTASSIUM 3.6  03/01/2022    POTASSIUM 4.2 01/21/2021    CHLORIDE 105 03/01/2022    CHLORIDE 106 01/21/2021    CO2 27 03/01/2022    CO2 30 01/21/2021    ANIONGAP 5 03/01/2022    ANIONGAP 2 (L) 01/21/2021     (H) 03/01/2022    GLC 97 01/21/2021    BUN 20 03/01/2022    BUN 20 01/21/2021    CR 0.91 03/01/2022    CR 0.90 01/21/2021    GFRESTIMATED >90 03/01/2022    GFRESTIMATED >90 01/21/2021    GFRESTBLACK >90 01/21/2021    DANY 9.0 03/01/2022    DANY 8.9 01/21/2021        A1C RESULTS:  Lab Results   Component Value Date    A1C 5.0 07/28/2021       INR RESULTS:  Lab Results   Component Value Date    INR 0.97 07/30/2021    INR 0.89 05/25/2012           Tyson Bennett PA-C  March 30, 2023

## 2023-04-05 ENCOUNTER — TELEPHONE (OUTPATIENT)
Dept: CARDIOLOGY | Facility: CLINIC | Age: 63
End: 2023-04-05

## 2023-04-05 ENCOUNTER — OFFICE VISIT (OUTPATIENT)
Dept: CARDIOLOGY | Facility: CLINIC | Age: 63
End: 2023-04-05
Attending: INTERNAL MEDICINE
Payer: COMMERCIAL

## 2023-04-05 ENCOUNTER — LAB (OUTPATIENT)
Dept: LAB | Facility: CLINIC | Age: 63
End: 2023-04-05
Payer: COMMERCIAL

## 2023-04-05 VITALS
WEIGHT: 189.8 LBS | SYSTOLIC BLOOD PRESSURE: 133 MMHG | HEART RATE: 73 BPM | HEIGHT: 71 IN | DIASTOLIC BLOOD PRESSURE: 87 MMHG | BODY MASS INDEX: 26.57 KG/M2 | OXYGEN SATURATION: 100 %

## 2023-04-05 DIAGNOSIS — I25.10 CORONARY ARTERY DISEASE INVOLVING NATIVE CORONARY ARTERY OF NATIVE HEART WITHOUT ANGINA PECTORIS: ICD-10-CM

## 2023-04-05 LAB
ALT SERPL W P-5'-P-CCNC: 23 U/L (ref 10–50)
CHOLEST SERPL-MCNC: 126 MG/DL
HDLC SERPL-MCNC: 60 MG/DL
LDLC SERPL CALC-MCNC: 51 MG/DL
NONHDLC SERPL-MCNC: 66 MG/DL
TRIGL SERPL-MCNC: 77 MG/DL

## 2023-04-05 PROCEDURE — 99214 OFFICE O/P EST MOD 30 MIN: CPT | Performed by: PHYSICIAN ASSISTANT

## 2023-04-05 PROCEDURE — 84460 ALANINE AMINO (ALT) (SGPT): CPT | Performed by: INTERNAL MEDICINE

## 2023-04-05 PROCEDURE — 80061 LIPID PANEL: CPT | Performed by: INTERNAL MEDICINE

## 2023-04-05 PROCEDURE — 36415 COLL VENOUS BLD VENIPUNCTURE: CPT | Performed by: INTERNAL MEDICINE

## 2023-04-05 NOTE — TELEPHONE ENCOUNTER
"FLP not available at time of today's appointment, routed to Tyson to review.     Clinic note from today- \"His lipid panel is pending at this time but ALT is normal. If his numbers continue to be suboptimal, we will consider addition of ezetimibe to his regimen.\" Hx CAD/stents, HLD. On atorvastatin 20mg daily.       Component      Latest Ref Rng 4/5/2023   Cholesterol      <200 mg/dL 126    Triglycerides      <150 mg/dL 77    HDL Cholesterol      >=40 mg/dL 60    LDL Cholesterol Calculated      <=100 mg/dL 51    Non HDL Cholesterol      <130 mg/dL 66    ALT      10 - 50 U/L 23        Addendum- Tyson's reply,     Tyson Bennett PA-C  You 9 minutes ago (11:59 AM)     Yes, looks good. Continue with same dose.         Spoke to patient, reviewed labs and plan to stay on current medications and see Tyson again end of summer per Dr Park's note. Phone number for schedulign provided to call and arrange.   "

## 2023-04-05 NOTE — LETTER
4/5/2023    Bharathi Chowdary MD  [facility] [address]  [facility] [address]    RE: Chris Barriga       Dear Colleague,     I had the pleasure of seeing Chris Barriga in the [facility].  Primary Cardiologist: Dr. Park    Reason for Visit: 1 month follow up after increase in atorvastatin with fasting lipid panel    History of Present Illness:   Chava is a very pleasant 62-year-old male with past medical history notable for coronary artery disease (proximal LAD artery stenting in 2012; complex stenting of very angulated OM1 marginal requiring stents to OM, proximal and mid LCX arteries in 7/2020), hypertension, hyperlipidemia, and mild aortic root dilatation.    During his recent visit with Dr. Park he had a backslide in his cholesterol numbers and therefore his atorvastatin was increased from 10 mg to 20 mg daily. He returns to clinic today for follow up. Plan was to consider pravastatin if he had intolerance to increased dose of atorvastatin. Of note, he did not tolerate rosuvastatin before. He had vivid dreams and insomnia with it. He is feeling better on atorvastatin with no side effects.     Assessment and Plan:  Chava is a very pleasant 62-year-old male with past medical history notable for coronary artery disease (proximal LAD artery stenting in 2012; complex stenting of very angulated OM1 marginal requiring stents to OM, proximal and mid LCX arteries in 7/2020), hypertension, hyperlipidemia, and mild aortic root dilatation.    Chava appears to be doing well from a cardiac standpoint. We will continue with current regimen. His lipid panel is pending at this time but ALT is normal. If his numbers continue to be suboptimal, we will consider addition of ezetimibe to his regimen.       This note was completed in part using Dragon voice recognition software. Although reviewed after completion, some word and grammatical errors may occur.    Orders this Visit:  No orders  of the defined types were placed in this encounter.    No orders of the defined types were placed in this encounter.    There are no discontinued medications.      No diagnosis found.    CURRENT MEDICATIONS:  Current Outpatient Medications   Medication Sig Dispense Refill    acetaminophen (TYLENOL) 325 MG tablet Take 2 tablets (650 mg) by mouth every 4 hours as needed for mild pain or headaches      atorvastatin (LIPITOR) 20 MG tablet Take 1 tablet (20 mg) by mouth daily 90 tablet 3    clopidogrel (PLAVIX) 75 MG tablet Take 4 tablets (300mg) by mouth the first day and then 1 tablet (75mg) by mouth daily thereafter. 90 tablet 3    doxycycline hyclate (VIBRA-TABS) 100 MG tablet Take 100 mg by mouth 2 times daily Taking as needed      Flaxseed, Linseed, (FLAX SEED OIL) 1000 MG capsule Take 1 capsule by mouth daily      glucosamine-chondroitin 500-400 MG CAPS Take 2 capsules by mouth daily       hydrocortisone (WESTCORT) 0.2 % cream Apply topically 2 times daily as needed       loratadine 10 MG capsule Take 10 mg by mouth daily      losartan-hydrochlorothiazide (HYZAAR) 100-25 MG tablet Take 1 tablet by mouth daily 90 tablet 4    Lysine 500 MG TABS Take 1 tablet by mouth daily.      magnesium 500 MG TABS Take 500 mg by mouth every evening      MINOCYCLINE HCL PO Take 100 mg by mouth daily as needed (acne)  (Patient not taking: Reported on 2/21/2023)      Multiple Vitamin (MULTI-VITAMIN) per tablet Take 1 tablet by mouth daily.      nitroGLYcerin (NITROSTAT) 0.4 MG sublingual tablet Place 1 tablet (0.4 mg) under the tongue every 5 minutes as needed for chest pain Administer every 5 minutes as needed.  Maximum 3 doses in 15 minutes. 30 tablet 0    Potassium Gluconate 595 (99 K) MG TABS       ramelteon (ROZEREM) 8 MG tablet       sildenafil (VIAGRA) 50 MG tablet TAKE 1 TABLET BEFORE SEXUAL ACTIVITY. MAX 100MG/24HRS      zinc gluconate 50 MG tablet          ALLERGIES     Allergies   Allergen Reactions    Seasonal Allergies      Crestor [Rosuvastatin] Muscle Pain (Myalgia)    Hydrocodone-Acetaminophen Other (See Comments)     FEELS WIRED  FEELS WIRED  Hyperactive, insomnia.       Molds & Smuts        PAST MEDICAL HISTORY:  Past Medical History:   Diagnosis Date    Adenoma of ascending colon     Aortic root dilation (H)     Chest pain     Coronary artery disease     5/2012: Stent to proximal LAD for unstable angina. Proximal left circumflex 30-40%. EF 60%    HTN (hypertension)     Hyperlipidaemia     Iliac artery aneurysm, left (H)        PAST SURGICAL HISTORY:  Past Surgical History:   Procedure Laterality Date    APPENDECTOMY OPEN      ARTHROSCOPY KNEE BILATERAL      both knees    ARTHROSCOPY SHOULDER      left    CARDIAC SURGERY      stent x 1    COLONOSCOPY N/A 2/17/2017    Procedure: COLONOSCOPY;  Surgeon: Junie Grier MD;  Location:  OR    [place]  [facility] [place]    COLONOSCOPY  05/22/2017    Dr. Grier [facility]    COLONOSCOPY N/A 5/29/2018    Procedure: COLONOSCOPY;  colonoscopy (crsal);  Surgeon: Junie Grier MD;  Location:  GI    CV HEART CATHETERIZATION WITH POSSIBLE INTERVENTION N/A 7/28/2021    Procedure: Heart Catheterization with Possible Intervention;  Surgeon: Buddy Martin MD;  Location:  HEART CARDIAC CATH LAB    CV INTRAVASULAR ULTRASOUND N/A 7/30/2021    Procedure: Intravascular Ultrasound;  Surgeon: Mitchell Morales MD;  Location:  HEART CARDIAC CATH LAB    CV PCI STENT DRUG ELUTING N/A 7/30/2021    Procedure: Percutaneous Coronary Intervention Stent Drug Eluting;  Surgeon: Mitchell Morales MD;  Location:  HEART CARDIAC CATH LAB    LAPAROSCOPIC HERNIORRHAPHY INGUINAL  12/30/2013    Procedure: LAPAROSCOPIC HERNIORRHAPHY INGUINAL;  LAPAROSCOPIC RIGHT INGUINAL HERNIA REPAIR WITH MESH;  Surgeon: Reji Pizano MD;  Location: [facility]    ORTHOPEDIC SURGERY      scope of both shoulders    VASECTOMY      ZZC ORAL SURGERY PROCEDURE         FAMILY  HISTORY:  Family History   Problem Relation Age of Onset    Breast Cancer Mother     Cerebrovascular Disease Father     Breast Cancer Maternal Grandmother     Breast Cancer Son     Colon Cancer No family hx of        SOCIAL HISTORY:  Social History     Socioeconomic History    Marital status:    Tobacco Use    Smoking status: Never    Smokeless tobacco: Never   Vaping Use    Vaping Use: Never used   Substance and Sexual Activity    Alcohol use: Yes     Comment: 1 glass of wine occ    Drug use: No       Review of Systems:  Skin:        Eyes:       ENT:       Respiratory:       Cardiovascular:       Gastroenterology:      Genitourinary:       Musculoskeletal:       Neurologic:       Psychiatric:       Heme/Lymph/Imm:       Endocrine:         Physical Exam:  Vitals: There were no vitals taken for this visit.     GEN:  NAD  NECK: No JVD  C/V:  Regular rate and rhythm, no murmur, rub or gallop.  RESP: Clear to auscultation bilaterally.  GI: Abdomen soft, nontender, nondistended.   EXTREM: No pitting LE edema.   NEURO: Alert and oriented, cooperative. No obvious focal deficits.   PSYCH: Normal affect.  SKIN: Warm and dry.       Recent Lab Results:  LIPID RESULTS:  Lab Results   Component Value Date    CHOL 171 02/20/2023    CHOL 147 01/21/2021    HDL 86 02/20/2023    HDL 91 01/21/2021    LDL 72 02/20/2023    LDL 35 01/21/2021    TRIG 63 02/20/2023    TRIG 107 01/21/2021    CHOLHDLRATIO 1.7 08/20/2012       LIVER ENZYME RESULTS:  Lab Results   Component Value Date    AST 21 09/04/2018    ALT 29 02/20/2023    ALT 24 09/17/2020       CBC RESULTS:  Lab Results   Component Value Date    WBC 5.9 07/30/2021    WBC 5.5 08/21/2017    RBC 4.37 (L) 07/30/2021    RBC 4.81 08/21/2017    HGB 14.4 07/30/2021    HGB 15.4 08/21/2017    HCT 41.8 07/30/2021    HCT 44.3 08/21/2017    MCV 96 07/30/2021    MCV 92 08/21/2017    MCH 33.0 07/30/2021    MCH 32 08/21/2017    MCHC 34.4 07/30/2021    MCHC 34.8 08/21/2017    RDW 13.2 07/30/2021     RDW 13 08/21/2017     07/30/2021     08/21/2017       BMP RESULTS:  Lab Results   Component Value Date     03/01/2022     01/21/2021    POTASSIUM 3.6 03/01/2022    POTASSIUM 4.2 01/21/2021    CHLORIDE 105 03/01/2022    CHLORIDE 106 01/21/2021    CO2 27 03/01/2022    CO2 30 01/21/2021    ANIONGAP 5 03/01/2022    ANIONGAP 2 (L) 01/21/2021     (H) 03/01/2022    GLC 97 01/21/2021    BUN 20 03/01/2022    BUN 20 01/21/2021    CR 0.91 03/01/2022    CR 0.90 01/21/2021    GFRESTIMATED >90 03/01/2022    GFRESTIMATED >90 01/21/2021    GFRESTBLACK >90 01/21/2021    DANY 9.0 03/01/2022    DANY 8.9 01/21/2021        A1C RESULTS:  Lab Results   Component Value Date    A1C 5.0 07/28/2021       INR RESULTS:  Lab Results   Component Value Date    INR 0.97 07/30/2021    INR 0.89 05/25/2012           Tyson Bennett PA-C  March 30, 2023         Thank you for allowing me to participate in the care of your patient.      Sincerely,     Tyson Bennett PA-C     [facility]  cc:   Jan Park MD  [address]  [place] [id number]

## 2023-04-24 ENCOUNTER — APPOINTMENT (OUTPATIENT)
Dept: URBAN - METROPOLITAN AREA CLINIC 256 | Age: 63
Setting detail: DERMATOLOGY
End: 2023-04-24

## 2023-04-24 DIAGNOSIS — L57.8 OTHER SKIN CHANGES DUE TO CHRONIC EXPOSURE TO NONIONIZING RADIATION: ICD-10-CM

## 2023-04-24 DIAGNOSIS — L82.0 INFLAMED SEBORRHEIC KERATOSIS: ICD-10-CM

## 2023-04-24 DIAGNOSIS — Z85.828 PERSONAL HISTORY OF OTHER MALIGNANT NEOPLASM OF SKIN: ICD-10-CM

## 2023-04-24 DIAGNOSIS — L82.1 OTHER SEBORRHEIC KERATOSIS: ICD-10-CM

## 2023-04-24 DIAGNOSIS — L57.0 ACTINIC KERATOSIS: ICD-10-CM

## 2023-04-24 PROCEDURE — 99213 OFFICE O/P EST LOW 20 MIN: CPT | Mod: 25

## 2023-04-24 PROCEDURE — 17000 DESTRUCT PREMALG LESION: CPT | Mod: 59

## 2023-04-24 PROCEDURE — OTHER COUNSELING: OTHER

## 2023-04-24 PROCEDURE — OTHER LIQUID NITROGEN: OTHER

## 2023-04-24 PROCEDURE — 17110 DESTRUCT B9 LESION 1-14: CPT

## 2023-04-24 PROCEDURE — OTHER REASSURANCE: OTHER

## 2023-04-24 PROCEDURE — 17003 DESTRUCT PREMALG LES 2-14: CPT | Mod: 59

## 2023-04-24 PROCEDURE — OTHER MIPS QUALITY: OTHER

## 2023-04-24 ASSESSMENT — LOCATION SIMPLE DESCRIPTION DERM
LOCATION SIMPLE: RIGHT EAR
LOCATION SIMPLE: LEFT ZYGOMA
LOCATION SIMPLE: LEFT CHEEK
LOCATION SIMPLE: LEFT UPPER BACK
LOCATION SIMPLE: RIGHT UPPER BACK
LOCATION SIMPLE: INFERIOR FOREHEAD
LOCATION SIMPLE: RIGHT CHEEK
LOCATION SIMPLE: UPPER BACK
LOCATION SIMPLE: RIGHT TEMPLE

## 2023-04-24 ASSESSMENT — LOCATION DETAILED DESCRIPTION DERM
LOCATION DETAILED: RIGHT SUPERIOR LATERAL UPPER BACK
LOCATION DETAILED: RIGHT CENTRAL TEMPLE
LOCATION DETAILED: LEFT MEDIAL ZYGOMA
LOCATION DETAILED: LEFT INFERIOR LATERAL UPPER BACK
LOCATION DETAILED: RIGHT SCAPHA
LOCATION DETAILED: SUPERIOR THORACIC SPINE
LOCATION DETAILED: RIGHT CENTRAL MALAR CHEEK
LOCATION DETAILED: LEFT INFERIOR PREAURICULAR CHEEK
LOCATION DETAILED: INFERIOR MID FOREHEAD

## 2023-04-24 ASSESSMENT — LOCATION ZONE DERM
LOCATION ZONE: FACE
LOCATION ZONE: TRUNK
LOCATION ZONE: EAR

## 2023-04-24 NOTE — PROCEDURE: LIQUID NITROGEN
Consent: The patient's consent was obtained including but not limited to risks of crusting, scabbing, blistering, scarring, darker or lighter pigmentary change, recurrence, incomplete removal and infection.
Detail Level: Detailed
Show Topical Anesthesia Variable?: Yes
Spray Paint Text: The liquid nitrogen was applied to the skin utilizing a spray paint frosting technique.
Application Tool (Optional): Liquid Nitrogen Sprayer
Number Of Freeze-Thaw Cycles: 1 freeze-thaw cycle
Duration Of Freeze Thaw-Cycle (Seconds): 15-20
Post-Care Instructions: I reviewed with the patient in detail post-care instructions. Patient is to wear sunprotection, and avoid picking at any of the treated lesions. Pt may apply Vaseline to crusted or scabbing areas.
Render Post-Care Instructions In Note?: no
Medical Necessity Clause: This procedure was medically necessary because the lesions that were treated were:
Medical Necessity Information: It is in your best interest to select a reason for this procedure from the list below. All of these items fulfill various CMS LCD requirements except the new and changing color options.
Duration Of Freeze Thaw-Cycle (Seconds): 20

## 2023-04-28 ENCOUNTER — MYC MEDICAL ADVICE (OUTPATIENT)
Dept: CARDIOLOGY | Facility: CLINIC | Age: 63
End: 2023-04-28
Payer: COMMERCIAL

## 2023-04-28 DIAGNOSIS — R53.83 FATIGUE: ICD-10-CM

## 2023-04-28 DIAGNOSIS — R06.02 SHORTNESS OF BREATH: Primary | ICD-10-CM

## 2023-04-28 DIAGNOSIS — I25.10 CORONARY ARTERY DISEASE INVOLVING NATIVE CORONARY ARTERY OF NATIVE HEART WITHOUT ANGINA PECTORIS: ICD-10-CM

## 2023-04-28 NOTE — TELEPHONE ENCOUNTER
My chart message from patient:  Chava Barriga [facility] Team 2  Phone Number: [phone]     Dr. Park:   Per my recent appointment on 2/21/2023, we discussed that I should contact you if any of my symptoms changed.  In the past 12 days, I have noted an increase in days not feeling great (estimate 8 out of the 12 days).  Symptoms include some shortness of breath and general fatigue, especially on walks.  In addition, today was the first time I noticed some slight chest discomfort during my time on the elliptical machine.  It subsided after I stopped but this is the first time I have experienced this since before my most recent stent placement in 2021.  Let me know what you think.   Thanks.   Chris Barriga [phone]       Per Dr. Park's dictation 2/21/2023:   I suspect he may occasionally be having spasm and that we will continue with taking nitroglycerin on a p.r.n. basis.  I have told him if it should change, increase in frequency, duration, be triggered by exercise, then we need to see him and we would probably run a stress test, possibly repeat his angiogram.  But at this point, he states it has been running this pattern ever since his intervention and has not changed.    Will message Dr. Park to review    My chart message to patient:    Demetrius, Mr. Barriga,    We will send your message to Dr. Park. He is off until Tuesday, but sometimes checks his messages early.  If you feel your symptoms are accelerating or are more severe, please call and we can discuss with a partner provider.    Thank you  Team 2 RNs  [phone] reply from patient:  Chava Barriga [facility] Team 2  Phone Number: [phone]     Thanks.  Will do; I have used the nitro pills a few times and it seems to help.  Dr. Park wanted me to let him know if things were different and this seems like a significant change from my perspective over the past several days.     Chris Barriga

## 2023-05-01 NOTE — TELEPHONE ENCOUNTER
Stress echo recommended by Dr Park. Order placed and mychart reply sent to patient with recommendation and scheduling phone number.

## 2023-05-08 ENCOUNTER — DOCUMENTATION ONLY (OUTPATIENT)
Dept: CARDIOLOGY | Facility: CLINIC | Age: 63
End: 2023-05-08
Payer: COMMERCIAL

## 2023-05-08 NOTE — PROGRESS NOTES
"Judith Reed, Jan Grayson MD; P [facility] [place] Heart Team 2  See reasons for denial of stress echo below. Pt had sent a mychart on  reporting increased fatigue and STEVENS, and the stress test order reflects this so I'm not sure why they are denying it? The last stress test he had was 3/2022 so over a year ago. Do you want to appeal it? Order a different test?           Previous Messages       ----- Message -----   From: Cheryl Kc LPN   Sent: 2023   8:51 AM CDT   To: [facility] [place] Heart Team 2   Subject: FW: Insurance Denial Notification                   ----- Message -----   From: Katelynn Portillo CMA   Sent: 2023   8:51 AM CDT   To: Cheryl Kc LPN   Subject: FW: Insurance Denial Notification                   ----- Message -----   From: Jorge Wallace   Sent: 2023   8:47 AM CDT   To: Jan Park MD; *   Subject: Insurance Denial Notification                     Insurance Denial Notification     Patient Name: Chris Barriga   : 1960   MRN: [id number]     Dr. Park,     The authorization for procedure ECHO STRESS ECHOCARDIOGRAM on date of service 05/10/2023 has been denied by the patient's insurance for the following reason:     Denial Reason:     \"The reason for our determination is: Based on [facility] Cardiac Imaging Guidelines Section(s): Stress Testing with Imaging - Indications (CD 1.4) and Preface to the Imaging Guidelines, section Preface-3.1 Clinical Information, we cannot approve this request. Your healthcare provider told us that you had a stent placed in one or more of the blood vessels that supply blood to your heart. A stent is a tiny wire mesh tube inserted into a blocked or narrowed blood vessel in order to keep it open. The request cannot be approved because:     It must be needed for one of the following reasons.   -Follow-up once, two years or more after your stent (tube inserted to keep blood vessel open) was placed.   -Follow-up once, five " "years after your Coronary Artery Bypass Graft (a surgery to bypass blood vessels that supply the heart).   -New signs and/or symptoms that suggest you may have cardiac ischemia (decreased oxygen to your heart muscle).     A study similar to the one requested has recently been performed. The results of that study showed your doctor what they needed to see in order to treat your condition. No additional imaging is necessary at this time.\"       Please let us know how you wish to proceed as soon as possible. We will contact the patient after 1 business day.     Thank you,     Jorge Castro Prior Authorization        "

## 2023-05-09 ENCOUNTER — TELEPHONE (OUTPATIENT)
Dept: CARDIOLOGY | Facility: CLINIC | Age: 63
End: 2023-05-09
Payer: COMMERCIAL

## 2023-05-09 NOTE — TELEPHONE ENCOUNTER
Message regarding insp-ez-tvnd review for stress echo:  Mortimer, Kaye L, RN  Jorge Wallace N; Judith Reed RN; P [facility] Heart Team 2  Hi all,     Dr. Park completed the P2P     Authorized: 5/9/2023   Expires: 6/23   Auth# [id number]     Thanks,   Flaca Yanez called patient with update.

## 2023-05-10 ENCOUNTER — TELEPHONE (OUTPATIENT)
Dept: CARDIOLOGY | Facility: CLINIC | Age: 63
End: 2023-05-10

## 2023-05-10 ENCOUNTER — HOSPITAL ENCOUNTER (OUTPATIENT)
Dept: CARDIOLOGY | Facility: CLINIC | Age: 63
Discharge: HOME | End: 2023-05-10
Attending: INTERNAL MEDICINE | Admitting: INTERNAL MEDICINE
Payer: COMMERCIAL

## 2023-05-10 DIAGNOSIS — I25.5 ISCHEMIC CARDIOMYOPATHY: ICD-10-CM

## 2023-05-10 DIAGNOSIS — R06.02 SHORTNESS OF BREATH: ICD-10-CM

## 2023-05-10 DIAGNOSIS — I25.118 CORONARY ARTERY DISEASE OF NATIVE ARTERY OF NATIVE HEART WITH STABLE ANGINA PECTORIS (H): Primary | ICD-10-CM

## 2023-05-10 DIAGNOSIS — I25.10 CORONARY ARTERY DISEASE INVOLVING NATIVE CORONARY ARTERY OF NATIVE HEART WITHOUT ANGINA PECTORIS: ICD-10-CM

## 2023-05-10 DIAGNOSIS — R53.83 FATIGUE: ICD-10-CM

## 2023-05-10 PROCEDURE — 93321 DOPPLER ECHO F-UP/LMTD STD: CPT | Mod: 26 | Performed by: INTERNAL MEDICINE

## 2023-05-10 PROCEDURE — 93016 CV STRESS TEST SUPVJ ONLY: CPT | Performed by: INTERNAL MEDICINE

## 2023-05-10 PROCEDURE — 93018 CV STRESS TEST I&R ONLY: CPT | Performed by: INTERNAL MEDICINE

## 2023-05-10 PROCEDURE — 93325 DOPPLER ECHO COLOR FLOW MAPG: CPT | Mod: TC

## 2023-05-10 PROCEDURE — 255N000002 HC RX 255 OP 636: Performed by: INTERNAL MEDICINE

## 2023-05-10 PROCEDURE — 93321 DOPPLER ECHO F-UP/LMTD STD: CPT | Mod: TC

## 2023-05-10 PROCEDURE — 93325 DOPPLER ECHO COLOR FLOW MAPG: CPT | Mod: 26 | Performed by: INTERNAL MEDICINE

## 2023-05-10 PROCEDURE — 93350 STRESS TTE ONLY: CPT | Mod: 26 | Performed by: INTERNAL MEDICINE

## 2023-05-10 RX ADMIN — HUMAN ALBUMIN MICROSPHERES AND PERFLUTREN 9 ML: 10; .22 INJECTION, SOLUTION INTRAVENOUS at 11:49

## 2023-05-10 NOTE — TELEPHONE ENCOUNTER
Stress echo 5/10/2023 noted. Ordered for new c/o of SOB, fatigue and some chest discomfort.  Patient's last coronary intervention was in 2021 with complex PCI.  Patient to see GERONIMO Tyson Bennett on 8/15/2023.    Stress echo:  Doppler findings do not suggest pulmonary hypertension.  The patient exhibited hypertension at rest.  Rest echo show small severe area of hypokinesis along lateral/infero-lateral wall  A moderate workload was achieved.  There were no ST segment changes observed with stress.  Patient has elevated BP at low work rate (stage 1 Frantz 172/92), Tech reports peak  Stage 2 which dropped to  at peak exercise. This somewhat small area of hypokinesis lateral/inf-lateral wall is not seen as well post exercise vs resting pictures. The area appears to improve function with exercise but images are limited. Pt reported 1/10 chest pain at peak exercise only. This test suggest prior MI and likely (not definite)  improvement with exercise. Clinical correlation suggested.    Will message Dr. Park to review

## 2023-05-11 ENCOUNTER — TELEPHONE (OUTPATIENT)
Dept: CARDIOLOGY | Facility: CLINIC | Age: 63
End: 2023-05-11
Payer: COMMERCIAL

## 2023-05-11 NOTE — TELEPHONE ENCOUNTER
** Need to place orders for angiogram  And GERONIMO /MD OV to discuss and  H&P ** if Patient agrees.     Call back 3: 07 PM Returned call - Spoke with Patient and wife - Dr. Park's recommendations reviewed. . Patient wants to discuss stress echo test first before angiogram orders placed and prefers a cardiologist. . Has many questions. If agrees will need angiogram orders entered   And   H&P done also.       2:07 PM . Spoke with Patient and Dr. Park's recommendation for OV and to set up for an angiogram reviewed.  Patient states he will think about the recommendations but is also thinking about getting  a second opinion somewhere else.     Advised that if symptoms worsen, prolong, or change and using Nitroglycerine to go immediately into ED for evaluation and assessment or call 911 .   Patient agrees with plan.       Message left to return call for recommendations.     Dr. Park's reply -   Have him come in and be set up for an angiogram, with GERONIMO or MD. Reason .. drop in exercise capacity and symptoms

## 2023-05-11 NOTE — TELEPHONE ENCOUNTER
5/11/23 Per Amairani PADRON pt to be seen with Any GERONIMO/MD to discuss abnormal test and possible H&P angiogram-On hold hold Hilton VANN 5/18/23 11:15am for pt please schedule manually-SL

## 2023-05-11 NOTE — TELEPHONE ENCOUNTER
Spoke with Patient regarding stress echo. Patient states he compared the stress echo yesterday to the Stress echo done 3-1-22.   - Patient states he was not able to do as much yesterday - only nine minutes when last year could go 13 minutes.  - Patient states he has noticed increased shortness of breath  Daily over the past 3 weeks and has some low grade chest discomfort which has been relieved with occasional Nitroglycerine SL.   - Patient states home BP's for the past few weeks have been very stable about 120/80.     Dr. Park's reply -   I think stress echo looks okay.  How is his blood pressure doing at home?  It was high during the stress test.      Last GERONIMO OV 4-5-23 -   Assessment and Plan:  Chava is a very pleasant 62-year-old male with past medical history notable for coronary artery disease (proximal LAD artery stenting in 2012; complex stenting of very angulated OM1 marginal requiring stents to OM, proximal and mid LCX arteries in 7/2020), hypertension, hyperlipidemia, and mild aortic root dilatation.    Next GERONIMO OV 8-15-23.

## 2023-05-12 DIAGNOSIS — I25.118 CORONARY ARTERY DISEASE OF NATIVE ARTERY OF NATIVE HEART WITH STABLE ANGINA PECTORIS (H): Primary | ICD-10-CM

## 2023-05-12 DIAGNOSIS — R06.02 SHORTNESS OF BREATH: ICD-10-CM

## 2023-05-15 ENCOUNTER — TELEPHONE (OUTPATIENT)
Dept: CARDIOLOGY | Facility: CLINIC | Age: 63
End: 2023-05-15
Payer: COMMERCIAL

## 2023-05-15 DIAGNOSIS — R94.39 ABNORMAL CARDIOVASCULAR STRESS TEST: ICD-10-CM

## 2023-05-15 DIAGNOSIS — R07.89 ATYPICAL CHEST PAIN: ICD-10-CM

## 2023-05-15 DIAGNOSIS — E78.5 HYPERLIPIDEMIA LDL GOAL <70: Primary | ICD-10-CM

## 2023-05-15 RX ORDER — SODIUM CHLORIDE 9 MG/ML
INJECTION, SOLUTION INTRAVENOUS CONTINUOUS
Status: CANCELLED | OUTPATIENT
Start: 2023-05-15

## 2023-05-15 RX ORDER — ASPIRIN 325 MG
325 TABLET ORAL ONCE
Status: CANCELLED | OUTPATIENT
Start: 2023-05-15 | End: 2023-05-15

## 2023-05-15 RX ORDER — LIDOCAINE 40 MG/G
CREAM TOPICAL
Status: CANCELLED | OUTPATIENT
Start: 2023-05-15

## 2023-05-15 RX ORDER — POTASSIUM CHLORIDE 1500 MG/1
20 TABLET, EXTENDED RELEASE ORAL
Status: CANCELLED | OUTPATIENT
Start: 2023-05-15

## 2023-05-15 RX ORDER — ASPIRIN 81 MG/1
243 TABLET, CHEWABLE ORAL ONCE
Status: CANCELLED | OUTPATIENT
Start: 2023-05-15

## 2023-05-15 NOTE — TELEPHONE ENCOUNTER
Patient sent a mychart asking for a return call. Spoke to patient, says he was responding to a voicemail that Dr Park left him on Friday. He did feel like his symptoms were worse over the weekend, with yesterday being the worst. He had 3/10 chest pain yesterday after exercising at 10am, took a nitroSL which relieved his symptoms. Had another episode around 6pm and repeated same process with relief. He is scheduled with Dr Canela on 5/18 as an urgent add on/to discuss angiogram (previous notes indicate he did not want to order/schedule without discussion).     Recommended patient not exercise/exert himself at this time. Reviewed that if he has increased frequency of episodes or resting symptoms then he needs to call 911. He verbalized understanding. Routed back to Dr Park.

## 2023-05-15 NOTE — TELEPHONE ENCOUNTER
Jan Park MD  You 15 minutes ago (10:54 AM)     Can we squeeze him into my new slot tomorrow at 2?  It can even be a video visit.  I had a lengthy discussion with him today and he clearly has crescendo angina.  Also could we schedule him in the Cath Lab for either Andrew or Addi Wednesday Thursday.  Or March on Friday.         Scheduling messaged to contact patient to arrange.

## 2023-05-15 NOTE — TELEPHONE ENCOUNTER
Coronary angiogram/PCI/Right Heart Cath prep instructions.     Patient is scheduled for a Coronary Angio w/possible PCI at [facility] - [address] [place] [address] - Main Entrance of the Hospital on 5/17/2023.  Check in time is at 0930 and procedure to follow.    Patient instructed to remain NPO for solid foods 8 hours prior to arrival and may have clear liquids up to 2 hours prior to arrival.    Patient Patient does not require extra fluids prior to procedure.    Patient is not diabetic.    Patient is not on anticoagulation.    Patient is taking hydrochlorothiazide and has been instructed to hold it the morning of procedure.     Patient is not currently taking ASA and has been advised to take one 325 mg tablet the day prior and morning of the procedure.    Pt is not on a SGLT2 inhibitor.    Patient advised to take their other daily medications the morning of the procedure with small sips of water.     Verified patient does not have a contrast allergy.    Verified patient has someone available to drive them home from the hospital and can stay with them for 24 hours after the procedure.     Patient advised to notify care team with any new COVID like symptoms prior to procedure.    Patient will check their temperature the morning of procedure and call [facility] at [phone] if temp is >100.0.    Patient is aware of visitor policy.    Patient expresses understanding of above instructions and denies further questions at this time.

## 2023-05-16 ENCOUNTER — TELEPHONE (OUTPATIENT)
Dept: CARDIOLOGY | Facility: CLINIC | Age: 63
End: 2023-05-16

## 2023-05-16 ENCOUNTER — OFFICE VISIT (OUTPATIENT)
Dept: CARDIOLOGY | Facility: CLINIC | Age: 63
End: 2023-05-16
Attending: INTERNAL MEDICINE
Payer: COMMERCIAL

## 2023-05-16 VITALS
BODY MASS INDEX: 25.94 KG/M2 | DIASTOLIC BLOOD PRESSURE: 90 MMHG | HEART RATE: 93 BPM | OXYGEN SATURATION: 98 % | WEIGHT: 186 LBS | SYSTOLIC BLOOD PRESSURE: 143 MMHG

## 2023-05-16 DIAGNOSIS — I25.118 CORONARY ARTERY DISEASE OF NATIVE ARTERY OF NATIVE HEART WITH STABLE ANGINA PECTORIS (H): Primary | ICD-10-CM

## 2023-05-16 DIAGNOSIS — I77.810 AORTIC ROOT DILATION (H): ICD-10-CM

## 2023-05-16 DIAGNOSIS — R94.39 ABNORMAL CARDIOVASCULAR STRESS TEST: ICD-10-CM

## 2023-05-16 DIAGNOSIS — I10 BENIGN ESSENTIAL HYPERTENSION: ICD-10-CM

## 2023-05-16 DIAGNOSIS — E78.00 PURE HYPERCHOLESTEROLEMIA: ICD-10-CM

## 2023-05-16 DIAGNOSIS — I25.5 ISCHEMIC CARDIOMYOPATHY: ICD-10-CM

## 2023-05-16 PROCEDURE — 99214 OFFICE O/P EST MOD 30 MIN: CPT | Performed by: INTERNAL MEDICINE

## 2023-05-16 RX ORDER — METOPROLOL SUCCINATE 25 MG/1
25 TABLET, EXTENDED RELEASE ORAL DAILY
Qty: 90 TABLET | Refills: 4 | Status: SHIPPED | OUTPATIENT
Start: 2023-05-16

## 2023-05-16 NOTE — TELEPHONE ENCOUNTER
Spoke with patient to review Dr. Park's request to start metoprolol succinate 25mg daily tonight and tomorrow prior to his procedure and to continue post-cath. Patient verbalized understanding and agreed with plan.

## 2023-05-16 NOTE — PROGRESS NOTES
HPI and Plan:   Chava is a very nice 62-year-old gentleman, who appears younger than his stated age and appears to be very fit individual.     Past medical history is significant for coronary artery disease, hypercholesterolemia, mildly dilated ascending aorta and ischemic cardiomyopathy.     I met him in 2017 as he wanted to establish with Cardiology and was having some side effects with statins.      His coronary artery history dates back to stenting of his proximal left anterior descending artery for unstable angina in 2012.  In 07/2020, again presented with unstable angina.  He underwent complex stenting of a very angulated first obtuse marginal, requiring stents in the obtuse marginal, proximal and mid circumflex coronary artery.  This did result in some plaque shift into his proximal left anterior descending artery.  Nonetheless, this resulted in complete resolution of his symptoms.  The patient did not tolerate metoprolol because of fatigue, tiredness and lack of energy.  He did seek a second opinion at [facility] to review his angiogram after his complex procedure of July.     Chava is always had an occasional episode of exertional chest discomfort that occur about every 4 to 6 weeks.  More recently he has noted a significant decrease in his exercise tolerance and some chest discomfort with activity.   We set him up for a stress echo for which he exercised 6 minutes less than his previous stress echo 1 year ago albeit he still exercised 9 minutes.  He did have hypertension and a hypertensive response to exercise.  His baseline echocardiogram does demonstrate an area of inferolateral hypokinesia at rest that appears it may have improved with exercise. He did have 1/10 chest discomfort at peak exercise.  His previous stress echo he went 16 minutes without symptoms and echo portion appeared to be normal at rest and with activity     He thinks his symptoms are continuing to progress.     ASSESSMENT AND  PLAN: Chava has had chronic stable exertional angina but now is crescendoing over the last several weeks to months.  We discussed the stress test and I recommended proceeding to coronary angiography for further evaluation treatment.  I discussed risk, benefits and alternatives with the patient and his wife.  They appeared understand desire to proceed.  I have treated him chronically with Plavix ever since I will start on aspirin.  He was on Effient after his last intervention.    He has no symptoms to suggest heart failure or significant arrhythmia.     Blood pressure is elevated at 143/90 with a pulse of 90 I am not sure if this is whitecoat hypertension and anxiety although his blood pressure was high when he came in for his stress test as well.  I will start him on metoprolol succinate 25 mg daily.       Fasting lipid profile is improved with his increase of atorvastatin to 20 mg daily.  LDL is now down to 51 HDL is 60.          Jan Park MD, [facility]       Today's clinic visit entailed:  Review of the result(s) of each unique test - Stress test, lab work  Ordering of each unique test  Prescription drug management  32 minutes spent by me on the date of the encounter doing chart review, history and exam, documentation and further activities per the note  Provider  Link to [facility] Help Grid     The level of medical decision making during this visit was of moderate complexity.      No orders of the defined types were placed in this encounter.      Orders Placed This Encounter   Medications     metoprolol succinate ER (TOPROL XL) 25 MG 24 hr tablet     Sig: Take 1 tablet (25 mg) by mouth daily     Dispense:  90 tablet     Refill:  4       There are no discontinued medications.      Encounter Diagnoses   Name Primary?     Coronary artery disease of native artery of native heart with stable angina pectoris (H) Yes     Ischemic cardiomyopathy      Benign essential hypertension      Pure hypercholesterolemia       Aortic root dilation (H)      Abnormal cardiovascular stress test        CURRENT MEDICATIONS:  Current Outpatient Medications   Medication Sig Dispense Refill     acetaminophen (TYLENOL) 325 MG tablet Take 2 tablets (650 mg) by mouth every 4 hours as needed for mild pain or headaches       atorvastatin (LIPITOR) 20 MG tablet Take 1 tablet (20 mg) by mouth daily 90 tablet 3     clopidogrel (PLAVIX) 75 MG tablet Take 4 tablets (300mg) by mouth the first day and then 1 tablet (75mg) by mouth daily thereafter. (Patient taking differently: Take 75 mg by mouth daily Take 4 tablets (300mg) by mouth the first day and then 1 tablet (75mg) by mouth daily thereafter.) 90 tablet 3     doxycycline hyclate (VIBRA-TABS) 100 MG tablet Take 100 mg by mouth 2 times daily Taking as needed       Flaxseed, Linseed, (FLAX SEED OIL) 1000 MG capsule Take 1 capsule by mouth daily       glucosamine-chondroitin 500-400 MG CAPS Take 2 capsules by mouth daily        hydrocortisone (WESTCORT) 0.2 % cream Apply topically 2 times daily as needed        loratadine 10 MG capsule Take 10 mg by mouth daily       losartan-hydrochlorothiazide (HYZAAR) 100-25 MG tablet Take 1 tablet by mouth daily 90 tablet 4     Lysine 500 MG TABS Take 1 tablet by mouth daily.       magnesium 500 MG TABS Take 500 mg by mouth every evening       Multiple Vitamin (MULTI-VITAMIN) per tablet Take 1 tablet by mouth daily.       nitroGLYcerin (NITROSTAT) 0.4 MG sublingual tablet Place 1 tablet (0.4 mg) under the tongue every 5 minutes as needed for chest pain Administer every 5 minutes as needed.  Maximum 3 doses in 15 minutes. 30 tablet 0     Potassium Gluconate 595 (99 K) MG TABS        ramelteon (ROZEREM) 8 MG tablet        sildenafil (VIAGRA) 50 MG tablet TAKE 1 TABLET BEFORE SEXUAL ACTIVITY. MAX 100MG/24HRS       zinc gluconate 50 MG tablet        metoprolol succinate ER (TOPROL XL) 25 MG 24 hr tablet Take 1 tablet (25 mg) by mouth daily 90 tablet 4     MINOCYCLINE HCL  PO Take 100 mg by mouth daily as needed (acne)  (Patient not taking: Reported on 2/21/2023)         ALLERGIES     Allergies   Allergen Reactions     Seasonal Allergies      Crestor [Rosuvastatin] Muscle Pain (Myalgia)     Hydrocodone-Acetaminophen Other (See Comments)     FEELS WIRED  FEELS WIRED  Hyperactive, insomnia.        Molds & Smuts        PAST MEDICAL HISTORY:  Past Medical History:   Diagnosis Date     Adenoma of ascending colon      Aortic root dilation (H)      Chest pain      Coronary artery disease     5/2012: Stent to proximal LAD for unstable angina. Proximal left circumflex 30-40%. EF 60%     HTN (hypertension)      Hyperlipidaemia      Iliac artery aneurysm, left (H)        PAST SURGICAL HISTORY:  Past Surgical History:   Procedure Laterality Date     APPENDECTOMY OPEN       ARTHROSCOPY KNEE BILATERAL      both knees     ARTHROSCOPY SHOULDER      left     CARDIAC SURGERY      stent x 1     COLONOSCOPY N/A 2/17/2017    Procedure: COLONOSCOPY;  Surgeon: Junie Grier MD;  Location:  OR     COLONOSCOPY  12/2016    [place] [facility] [place]     COLONOSCOPY  05/22/2017    Dr. Grier [facility]     COLONOSCOPY N/A 5/29/2018    Procedure: COLONOSCOPY;  colonoscopy (crsal);  Surgeon: Junie Grier MD;  Location:  GI     CV HEART CATHETERIZATION WITH POSSIBLE INTERVENTION N/A 7/28/2021    Procedure: Heart Catheterization with Possible Intervention;  Surgeon: Buddy Martin MD;  Location:  HEART CARDIAC CATH LAB     CV INTRAVASULAR ULTRASOUND N/A 7/30/2021    Procedure: Intravascular Ultrasound;  Surgeon: Mitchell Morales MD;  Location:  HEART CARDIAC CATH LAB     CV PCI STENT DRUG ELUTING N/A 7/30/2021    Procedure: Percutaneous Coronary Intervention Stent Drug Eluting;  Surgeon: Mitchell Morales MD;  Location:  HEART CARDIAC CATH LAB     LAPAROSCOPIC HERNIORRHAPHY INGUINAL  12/30/2013    Procedure: LAPAROSCOPIC HERNIORRHAPHY INGUINAL;  LAPAROSCOPIC RIGHT INGUINAL  HERNIA REPAIR WITH MESH;  Surgeon: Reji Pizano MD;  Location: [facility]     ORTHOPEDIC SURGERY      scope of both shoulders     VASECTOMY       ZZC ORAL SURGERY PROCEDURE         FAMILY HISTORY:  Family History   Problem Relation Age of Onset     Breast Cancer Mother      Cerebrovascular Disease Father      Breast Cancer Maternal Grandmother      Breast Cancer Son      Colon Cancer No family hx of        SOCIAL HISTORY:  Social History     Socioeconomic History     Marital status:      Spouse name: None     Number of children: None     Years of education: None     Highest education level: None   Tobacco Use     Smoking status: Never     Smokeless tobacco: Never   Vaping Use     Vaping status: Never Used   Substance and Sexual Activity     Alcohol use: Yes     Comment: 1 glass of wine occasionally     Drug use: No       Review of Systems:  Skin:  Negative       Eyes:  Negative      ENT:  Negative      Respiratory:  Positive for shortness of breath     Cardiovascular:  Negative for;palpitations;dizziness;lightheadedness;edema chest pain;Positive for;fatigue;heaviness sharp chest pain about 2-4 on pain scale of 10, has been having last episodes last 10 days that have been resolved by nitro  Gastroenterology: Negative      Genitourinary:  Negative      Musculoskeletal:  Positive for arthritis    Neurologic:  Negative      Psychiatric:  Negative      Heme/Lymph/Imm:  Negative      Endocrine:  Negative        Physical Exam:  Vitals: BP (!) 143/90   Pulse 93   Wt 84.4 kg (186 lb)   SpO2 98%   BMI 25.94 kg/m      Constitutional:  cooperative, alert and oriented, well developed, well nourished, in no acute distress   fit appearing    Skin:  warm and dry to the touch, no apparent skin lesions or masses noted          Head:  normocephalic, no masses or lesions        Eyes:  pupils equal and round, conjunctivae and lids unremarkable, sclera white, no xanthalasma, EOMS intact, no nystagmus        Lymph:      ENT:   no pallor or cyanosis, dentition good        Neck:  carotid pulses are full and equal bilaterally;no carotid bruit        Respiratory:  normal breath sounds, clear to auscultation, normal A-P diameter, normal symmetry, normal respiratory excursion, no use of accessory muscles         Cardiac: regular rhythm;normal S1 and S2       systolic ejection murmur;LUSB;grade 2        pulses full and equal                                        GI:           Extremities and Muscular Skeletal:  no edema;no spinal abnormalities noted;normal muscle strength and tone              Neurological:  no gross motor deficits        Psych:  affect appropriate, oriented to time, person and place        CC  Jan Park MD  [address]  [place] [address]

## 2023-05-16 NOTE — LETTER
5/16/2023    Bharathi Chowdary MD  [facility] [address]  [facility] [address]    RE: Chris Barriga       Dear Colleague,     I had the pleasure of seeing [facility]ue in the [facility].  HPI and Plan:   Chava is a very nice 62-year-old gentleman, who appears younger than his stated age and appears to be very fit individual.     Past medical history is significant for coronary artery disease, hypercholesterolemia, mildly dilated ascending aorta and ischemic cardiomyopathy.     I met him in 2017 as he wanted to establish with Cardiology and was having some side effects with statins.      His coronary artery history dates back to stenting of his proximal left anterior descending artery for unstable angina in 2012.  In 07/2020, again presented with unstable angina.  He underwent complex stenting of a very angulated first obtuse marginal, requiring stents in the obtuse marginal, proximal and mid circumflex coronary artery.  This did result in some plaque shift into his proximal left anterior descending artery.  Nonetheless, this resulted in complete resolution of his symptoms.  The patient did not tolerate metoprolol because of fatigue, tiredness and lack of energy.  He did seek a second opinion at [facility] to review his angiogram after his complex procedure of July.     Chava is always had an occasional episode of exertional chest discomfort that occur about every 4 to 6 weeks.  More recently he has noted a significant decrease in his exercise tolerance and some chest discomfort with activity.   We set him up for a stress echo for which he exercised 6 minutes less than his previous stress echo 1 year ago albeit he still exercised 9 minutes.  He did have hypertension and a hypertensive response to exercise.  His baseline echocardiogram does demonstrate an area of inferolateral hypokinesia at rest that appears it may have improved with exercise. He did have 1/10 chest discomfort at  peak exercise.  His previous stress echo he went 16 minutes without symptoms and echo portion appeared to be normal at rest and with activity     He thinks his symptoms are continuing to progress.     ASSESSMENT AND PLAN: Chava has had chronic stable exertional angina but now is crescendoing over the last several weeks to months.  We discussed the stress test and I recommended proceeding to coronary angiography for further evaluation treatment.  I discussed risk, benefits and alternatives with the patient and his wife.  They appeared understand desire to proceed.  I have treated him chronically with Plavix ever since I will start on aspirin.  He was on Effient after his last intervention.    He has no symptoms to suggest heart failure or significant arrhythmia.     Blood pressure is elevated at 143/90 with a pulse of 90 I am not sure if this is whitecoat hypertension and anxiety although his blood pressure was high when he came in for his stress test as well.  I will start him on metoprolol succinate 25 mg daily.       Fasting lipid profile is improved with his increase of atorvastatin to 20 mg daily.  LDL is now down to 51 HDL is 60.          Jan Park MD, [facility]       Today's clinic visit entailed:  Review of the result(s) of each unique test - Stress test, lab work  Ordering of each unique test  Prescription drug management  32 minutes spent by me on the date of the encounter doing chart review, history and exam, documentation and further activities per the note  Provider  Link to [facility] Help Grid     The level of medical decision making during this visit was of moderate complexity.      No orders of the defined types were placed in this encounter.      Orders Placed This Encounter   Medications    metoprolol succinate ER (TOPROL XL) 25 MG 24 hr tablet     Sig: Take 1 tablet (25 mg) by mouth daily     Dispense:  90 tablet     Refill:  4       There are no discontinued medications.      Encounter Diagnoses    Name Primary?    Coronary artery disease of native artery of native heart with stable angina pectoris (H) Yes    Ischemic cardiomyopathy     Benign essential hypertension     Pure hypercholesterolemia     Aortic root dilation (H)     Abnormal cardiovascular stress test        CURRENT MEDICATIONS:  Current Outpatient Medications   Medication Sig Dispense Refill    acetaminophen (TYLENOL) 325 MG tablet Take 2 tablets (650 mg) by mouth every 4 hours as needed for mild pain or headaches      atorvastatin (LIPITOR) 20 MG tablet Take 1 tablet (20 mg) by mouth daily 90 tablet 3    clopidogrel (PLAVIX) 75 MG tablet Take 4 tablets (300mg) by mouth the first day and then 1 tablet (75mg) by mouth daily thereafter. (Patient taking differently: Take 75 mg by mouth daily Take 4 tablets (300mg) by mouth the first day and then 1 tablet (75mg) by mouth daily thereafter.) 90 tablet 3    doxycycline hyclate (VIBRA-TABS) 100 MG tablet Take 100 mg by mouth 2 times daily Taking as needed      Flaxseed, Linseed, (FLAX SEED OIL) 1000 MG capsule Take 1 capsule by mouth daily      glucosamine-chondroitin 500-400 MG CAPS Take 2 capsules by mouth daily       hydrocortisone (WESTCORT) 0.2 % cream Apply topically 2 times daily as needed       loratadine 10 MG capsule Take 10 mg by mouth daily      losartan-hydrochlorothiazide (HYZAAR) 100-25 MG tablet Take 1 tablet by mouth daily 90 tablet 4    Lysine 500 MG TABS Take 1 tablet by mouth daily.      magnesium 500 MG TABS Take 500 mg by mouth every evening      Multiple Vitamin (MULTI-VITAMIN) per tablet Take 1 tablet by mouth daily.      nitroGLYcerin (NITROSTAT) 0.4 MG sublingual tablet Place 1 tablet (0.4 mg) under the tongue every 5 minutes as needed for chest pain Administer every 5 minutes as needed.  Maximum 3 doses in 15 minutes. 30 tablet 0    Potassium Gluconate 595 (99 K) MG TABS       ramelteon (ROZEREM) 8 MG tablet       sildenafil (VIAGRA) 50 MG tablet TAKE 1 TABLET BEFORE SEXUAL  ACTIVITY. MAX 100MG/24HRS      zinc gluconate 50 MG tablet       metoprolol succinate ER (TOPROL XL) 25 MG 24 hr tablet Take 1 tablet (25 mg) by mouth daily 90 tablet 4    MINOCYCLINE HCL PO Take 100 mg by mouth daily as needed (acne)  (Patient not taking: Reported on 2/21/2023)         ALLERGIES     Allergies   Allergen Reactions    Seasonal Allergies     Crestor [Rosuvastatin] Muscle Pain (Myalgia)    Hydrocodone-Acetaminophen Other (See Comments)     FEELS WIRED  FEELS WIRED  Hyperactive, insomnia.       Molds & Smuts        PAST MEDICAL HISTORY:  Past Medical History:   Diagnosis Date    Adenoma of ascending colon     Aortic root dilation (H)     Chest pain     Coronary artery disease     5/2012: Stent to proximal LAD for unstable angina. Proximal left circumflex 30-40%. EF 60%    HTN (hypertension)     Hyperlipidaemia     Iliac artery aneurysm, left (H)        PAST SURGICAL HISTORY:  Past Surgical History:   Procedure Laterality Date    APPENDECTOMY OPEN      ARTHROSCOPY KNEE BILATERAL      both knees    ARTHROSCOPY SHOULDER      left    CARDIAC SURGERY      stent x 1    COLONOSCOPY N/A 2/17/2017    Procedure: COLONOSCOPY;  Surgeon: Junie Grier MD;  Location:  OR    COLONOSCOPY  12/2016    [place] [facility] [place]    COLONOSCOPY  05/22/2017    Dr. Grier [facility]    COLONOSCOPY N/A 5/29/2018    Procedure: COLONOSCOPY;  colonoscopy (crsal);  Surgeon: Junie Grier MD;  Location:  GI    CV HEART CATHETERIZATION WITH POSSIBLE INTERVENTION N/A 7/28/2021    Procedure: Heart Catheterization with Possible Intervention;  Surgeon: Buddy Martin MD;  Location:  HEART CARDIAC CATH LAB    CV INTRAVASULAR ULTRASOUND N/A 7/30/2021    Procedure: Intravascular Ultrasound;  Surgeon: Mitchell Morales MD;  Location:  HEART CARDIAC CATH LAB    CV PCI STENT DRUG ELUTING N/A 7/30/2021    Procedure: Percutaneous Coronary Intervention Stent Drug Eluting;  Surgeon: Mitchell Morales MD;   Location:  HEART CARDIAC CATH LAB    LAPAROSCOPIC HERNIORRHAPHY INGUINAL  12/30/2013    Procedure: LAPAROSCOPIC HERNIORRHAPHY INGUINAL;  LAPAROSCOPIC RIGHT INGUINAL HERNIA REPAIR WITH MESH;  Surgeon: Reji Pizano MD;  Location: [facility]    ORTHOPEDIC SURGERY      scope of both shoulders    VASECTOMY      ZZC ORAL SURGERY PROCEDURE         FAMILY HISTORY:  Family History   Problem Relation Age of Onset    Breast Cancer Mother     Cerebrovascular Disease Father     Breast Cancer Maternal Grandmother     Breast Cancer Son     Colon Cancer No family hx of        SOCIAL HISTORY:  Social History     Socioeconomic History    Marital status:      Spouse name: None    Number of children: None    Years of education: None    Highest education level: None   Tobacco Use    Smoking status: Never    Smokeless tobacco: Never   Vaping Use    Vaping status: Never Used   Substance and Sexual Activity    Alcohol use: Yes     Comment: 1 glass of wine occasionally    Drug use: No       Review of Systems:  Skin:  Negative       Eyes:  Negative      ENT:  Negative      Respiratory:  Positive for shortness of breath     Cardiovascular:  Negative for;palpitations;dizziness;lightheadedness;edema chest pain;Positive for;fatigue;heaviness sharp chest pain about 2-4 on pain scale of 10, has been having last episodes last 10 days that have been resolved by nitro  Gastroenterology: Negative      Genitourinary:  Negative      Musculoskeletal:  Positive for arthritis    Neurologic:  Negative      Psychiatric:  Negative      Heme/Lymph/Imm:  Negative      Endocrine:  Negative        Physical Exam:  Vitals: BP (!) 143/90   Pulse 93   Wt 84.4 kg (186 lb)   SpO2 98%   BMI 25.94 kg/m      Constitutional:  cooperative, alert and oriented, well developed, well nourished, in no acute distress   fit appearing    Skin:  warm and dry to the touch, no apparent skin lesions or masses noted          Head:  normocephalic, no masses or lesions         Eyes:  pupils equal and round, conjunctivae and lids unremarkable, sclera white, no xanthalasma, EOMS intact, no nystagmus        Lymph:      ENT:  no pallor or cyanosis, dentition good        Neck:  carotid pulses are full and equal bilaterally;no carotid bruit        Respiratory:  normal breath sounds, clear to auscultation, normal A-P diameter, normal symmetry, normal respiratory excursion, no use of accessory muscles         Cardiac: regular rhythm;normal S1 and S2       systolic ejection murmur;LUSB;grade 2        pulses full and equal                                        GI:           Extremities and Muscular Skeletal:  no edema;no spinal abnormalities noted;normal muscle strength and tone              Neurological:  no gross motor deficits        Psych:  affect appropriate, oriented to time, person and place        CC  Jan Park MD  [address] [address]      Thank you for allowing me to participate in the care of your patient.      Sincerely,     Jan Park MD     [facility]

## 2023-05-16 NOTE — TELEPHONE ENCOUNTER
----- Message from Jan Park MD sent at 5/16/2023  3:16 PM CDT -----  Please ask patient to start on metoprolol succinate 25 mg daily.I sent it into his local pharmacy.  His heart rate and blood pressure were up today as well as with a stress test it is cardioprotective and will be good to be on prior to his procedure

## 2023-05-16 NOTE — TELEPHONE ENCOUNTER
Left detailed message.   Requested that Patient  medication today. and to call Back after receiving this message .     Per Dr. Park's message Attempted to call [facility]. Detailed message left that Dr. Park recommends to   start on metoprolol succinate 25 mg daily and .prescription was sent  Into your [facility] pharmacy.    Heart rate and blood pressure were up today as well as with a stress test it is cardioprotective and will be good to be on prior to his procedure.

## 2023-05-17 ENCOUNTER — HOSPITAL ENCOUNTER (OUTPATIENT)
Facility: CLINIC | Age: 63
Discharge: HOME | End: 2023-05-17
Admitting: INTERNAL MEDICINE
Payer: COMMERCIAL

## 2023-05-17 VITALS
SYSTOLIC BLOOD PRESSURE: 116 MMHG | HEART RATE: 65 BPM | DIASTOLIC BLOOD PRESSURE: 71 MMHG | OXYGEN SATURATION: 98 % | RESPIRATION RATE: 16 BRPM | TEMPERATURE: 98.9 F | HEIGHT: 71 IN | WEIGHT: 192 LBS | BODY MASS INDEX: 26.88 KG/M2

## 2023-05-17 DIAGNOSIS — R94.39 ABNORMAL CARDIOVASCULAR STRESS TEST: ICD-10-CM

## 2023-05-17 DIAGNOSIS — I25.118 CORONARY ARTERY DISEASE OF NATIVE ARTERY OF NATIVE HEART WITH STABLE ANGINA PECTORIS (H): ICD-10-CM

## 2023-05-17 DIAGNOSIS — R07.89 ATYPICAL CHEST PAIN: ICD-10-CM

## 2023-05-17 DIAGNOSIS — R06.02 SHORTNESS OF BREATH: ICD-10-CM

## 2023-05-17 LAB
ANION GAP SERPL CALCULATED.3IONS-SCNC: 12 MMOL/L (ref 7–15)
APTT PPP: 29 SECONDS (ref 22–38)
BUN SERPL-MCNC: 21.8 MG/DL (ref 8–23)
CALCIUM SERPL-MCNC: 8.9 MG/DL (ref 8.8–10.2)
CHLORIDE SERPL-SCNC: 104 MMOL/L (ref 98–107)
CREAT SERPL-MCNC: 0.8 MG/DL (ref 0.67–1.17)
DEPRECATED HCO3 PLAS-SCNC: 23 MMOL/L (ref 22–29)
ERYTHROCYTE [DISTWIDTH] IN BLOOD BY AUTOMATED COUNT: 13.4 % (ref 10–15)
GFR SERPL CREATININE-BSD FRML MDRD: >90 ML/MIN/1.73M2
GLUCOSE SERPL-MCNC: 97 MG/DL (ref 70–99)
HCT VFR BLD AUTO: 42.3 % (ref 40–53)
HGB BLD-MCNC: 14.8 G/DL (ref 13.3–17.7)
INR PPP: 0.95 (ref 0.85–1.15)
MCH RBC QN AUTO: 33 PG (ref 26.5–33)
MCHC RBC AUTO-ENTMCNC: 35 G/DL (ref 31.5–36.5)
MCV RBC AUTO: 94 FL (ref 78–100)
PLATELET # BLD AUTO: 162 10E3/UL (ref 150–450)
POTASSIUM SERPL-SCNC: 3.7 MMOL/L (ref 3.4–5.3)
RBC # BLD AUTO: 4.48 10E6/UL (ref 4.4–5.9)
SODIUM SERPL-SCNC: 139 MMOL/L (ref 136–145)
WBC # BLD AUTO: 6 10E3/UL (ref 4–11)

## 2023-05-17 PROCEDURE — 85027 COMPLETE CBC AUTOMATED: CPT | Performed by: INTERNAL MEDICINE

## 2023-05-17 PROCEDURE — C1760 CLOSURE DEV, VASC: HCPCS | Performed by: INTERNAL MEDICINE

## 2023-05-17 PROCEDURE — C1894 INTRO/SHEATH, NON-LASER: HCPCS | Performed by: INTERNAL MEDICINE

## 2023-05-17 PROCEDURE — 250N000011 HC RX IP 250 OP 636: Performed by: INTERNAL MEDICINE

## 2023-05-17 PROCEDURE — 250N000009 HC RX 250: Performed by: INTERNAL MEDICINE

## 2023-05-17 PROCEDURE — 85730 THROMBOPLASTIN TIME PARTIAL: CPT | Performed by: INTERNAL MEDICINE

## 2023-05-17 PROCEDURE — 999N000184 HC STATISTIC TELEMETRY

## 2023-05-17 PROCEDURE — 93005 ELECTROCARDIOGRAM TRACING: CPT

## 2023-05-17 PROCEDURE — 36415 COLL VENOUS BLD VENIPUNCTURE: CPT | Performed by: INTERNAL MEDICINE

## 2023-05-17 PROCEDURE — 99152 MOD SED SAME PHYS/QHP 5/>YRS: CPT | Performed by: INTERNAL MEDICINE

## 2023-05-17 PROCEDURE — 999N000071 HC STATISTIC HEART CATH LAB OR EP LAB

## 2023-05-17 PROCEDURE — 85610 PROTHROMBIN TIME: CPT | Performed by: INTERNAL MEDICINE

## 2023-05-17 PROCEDURE — 93454 CORONARY ARTERY ANGIO S&I: CPT | Mod: 26 | Performed by: INTERNAL MEDICINE

## 2023-05-17 PROCEDURE — 36591 DRAW BLOOD OFF VENOUS DEVICE: CPT

## 2023-05-17 PROCEDURE — 999N000054 HC STATISTIC EKG NON-CHARGEABLE

## 2023-05-17 PROCEDURE — 258N000003 HC RX IP 258 OP 636: Performed by: INTERNAL MEDICINE

## 2023-05-17 PROCEDURE — 93454 CORONARY ARTERY ANGIO S&I: CPT | Performed by: INTERNAL MEDICINE

## 2023-05-17 PROCEDURE — 80048 BASIC METABOLIC PNL TOTAL CA: CPT | Performed by: INTERNAL MEDICINE

## 2023-05-17 PROCEDURE — 250N000013 HC RX MED GY IP 250 OP 250 PS 637: Performed by: INTERNAL MEDICINE

## 2023-05-17 PROCEDURE — 99152 MOD SED SAME PHYS/QHP 5/>YRS: CPT | Mod: GC | Performed by: INTERNAL MEDICINE

## 2023-05-17 PROCEDURE — 272N000001 HC OR GENERAL SUPPLY STERILE: Performed by: INTERNAL MEDICINE

## 2023-05-17 DEVICE — CLOSURE ANGIOSEAL 6FR 610130: Type: IMPLANTABLE DEVICE | Status: FUNCTIONAL

## 2023-05-17 RX ORDER — POTASSIUM CHLORIDE 1500 MG/1
20 TABLET, EXTENDED RELEASE ORAL
Status: COMPLETED | OUTPATIENT
Start: 2023-05-17 | End: 2023-05-17

## 2023-05-17 RX ORDER — FENTANYL CITRATE 50 UG/ML
25 INJECTION, SOLUTION INTRAMUSCULAR; INTRAVENOUS
Status: DISCONTINUED | OUTPATIENT
Start: 2023-05-17 | End: 2023-05-17 | Stop reason: HOSPADM

## 2023-05-17 RX ORDER — LIDOCAINE 40 MG/G
CREAM TOPICAL
Status: DISCONTINUED | OUTPATIENT
Start: 2023-05-17 | End: 2023-05-17 | Stop reason: HOSPADM

## 2023-05-17 RX ORDER — FENTANYL CITRATE 50 UG/ML
INJECTION, SOLUTION INTRAMUSCULAR; INTRAVENOUS
Status: DISCONTINUED | OUTPATIENT
Start: 2023-05-17 | End: 2023-05-17 | Stop reason: HOSPADM

## 2023-05-17 RX ORDER — IOPAMIDOL 755 MG/ML
INJECTION, SOLUTION INTRAVASCULAR
Status: DISCONTINUED | OUTPATIENT
Start: 2023-05-17 | End: 2023-05-17 | Stop reason: HOSPADM

## 2023-05-17 RX ORDER — NALOXONE HYDROCHLORIDE 0.4 MG/ML
0.4 INJECTION, SOLUTION INTRAMUSCULAR; INTRAVENOUS; SUBCUTANEOUS
Status: DISCONTINUED | OUTPATIENT
Start: 2023-05-17 | End: 2023-05-17 | Stop reason: HOSPADM

## 2023-05-17 RX ORDER — ASPIRIN 325 MG
325 TABLET ORAL ONCE
Status: COMPLETED | OUTPATIENT
Start: 2023-05-17 | End: 2023-05-17

## 2023-05-17 RX ORDER — ATROPINE SULFATE 0.1 MG/ML
0.5 INJECTION INTRAVENOUS
Status: DISCONTINUED | OUTPATIENT
Start: 2023-05-17 | End: 2023-05-17 | Stop reason: HOSPADM

## 2023-05-17 RX ORDER — SODIUM CHLORIDE 9 MG/ML
INJECTION, SOLUTION INTRAVENOUS CONTINUOUS
Status: DISCONTINUED | OUTPATIENT
Start: 2023-05-17 | End: 2023-05-17 | Stop reason: HOSPADM

## 2023-05-17 RX ORDER — ASPIRIN 81 MG/1
243 TABLET, CHEWABLE ORAL ONCE
Status: COMPLETED | OUTPATIENT
Start: 2023-05-17 | End: 2023-05-17

## 2023-05-17 RX ORDER — UBIDECARENONE 30 MG
1 CAPSULE ORAL DAILY
COMMUNITY

## 2023-05-17 RX ORDER — NALOXONE HYDROCHLORIDE 0.4 MG/ML
0.2 INJECTION, SOLUTION INTRAMUSCULAR; INTRAVENOUS; SUBCUTANEOUS
Status: DISCONTINUED | OUTPATIENT
Start: 2023-05-17 | End: 2023-05-17 | Stop reason: HOSPADM

## 2023-05-17 RX ORDER — ACETAMINOPHEN 325 MG/1
650 TABLET ORAL EVERY 4 HOURS PRN
Status: DISCONTINUED | OUTPATIENT
Start: 2023-05-17 | End: 2023-05-17 | Stop reason: HOSPADM

## 2023-05-17 RX ORDER — OXYCODONE HYDROCHLORIDE 5 MG/1
10 TABLET ORAL EVERY 4 HOURS PRN
Status: DISCONTINUED | OUTPATIENT
Start: 2023-05-17 | End: 2023-05-17 | Stop reason: HOSPADM

## 2023-05-17 RX ORDER — OXYCODONE HYDROCHLORIDE 5 MG/1
5 TABLET ORAL EVERY 4 HOURS PRN
Status: DISCONTINUED | OUTPATIENT
Start: 2023-05-17 | End: 2023-05-17 | Stop reason: HOSPADM

## 2023-05-17 RX ORDER — FLUMAZENIL 0.1 MG/ML
0.2 INJECTION, SOLUTION INTRAVENOUS
Status: DISCONTINUED | OUTPATIENT
Start: 2023-05-17 | End: 2023-05-17 | Stop reason: HOSPADM

## 2023-05-17 RX ADMIN — SODIUM CHLORIDE: 9 INJECTION, SOLUTION INTRAVENOUS at 10:10

## 2023-05-17 RX ADMIN — POTASSIUM CHLORIDE 20 MEQ: 1500 TABLET, EXTENDED RELEASE ORAL at 11:09

## 2023-05-17 ASSESSMENT — ACTIVITIES OF DAILY LIVING (ADL)
ADLS_ACUITY_SCORE: 35

## 2023-05-17 NOTE — PROGRESS NOTES
Care Suites Discharge Nursing Note    Patient Information  Name: Chris Barriga  Age: 62 year old    Discharge Education:  Discharge instructions reviewed: Yes  Additional education/resources provided: angioseal booklet  Patient/patient representative verbalizes understanding: Yes  Patient discharging on new medications: No  Medication education completed: N/A    Discharge Plans:   Discharge location: home  Discharge ride contacted: Yes  Approximate discharge time: 1610    Discharge Criteria:  Discharge criteria met and vital signs stable: Yes    Patient Belongs:  Patient belongings returned to patient: Yes    Junie Gustafson RN

## 2023-05-17 NOTE — DISCHARGE INSTRUCTIONS
Cardiac Angiogram Discharge Instructions - Femoral    After you go home:    Have an adult stay with you until tomorrow.  Drink extra fluids for 2 days.  You may resume your normal diet.  No smoking       For 24 hours - due to the sedation you received:  Relax and take it easy.  Do NOT make any important or legal decisions.  Do NOT drive or operate machines at home or at work.  Do NOT drink alcohol.    Care of Groin Puncture Site:    For the first 24 hrs - check the puncture site every 1-2 hours while awake.  For 2 days, when you cough, sneeze, laugh or move your bowels, hold your hand over the puncture site and press firmly.  Remove the bandaid after 24 hours. If there is minor oozing, apply another bandaid and remove it after 12 hours.  It is normal to have a small bruise or pea size lump at the site.  You may shower tomorrow. Do NOT take a bath, or use a hot tub or pool for at least 3 days. Do NOT scrub the site. Do not use lotion or powder near the puncture site.    Activity:            For 2 days:  No stooping or squatting  Do NOT do any heavy activity such as exercise, lifting, or straining.   No housework, yard work or any activity that make you sweat  Do NOT lift more than 10 pounds    Bleeding:    If you start bleeding from the site in your groin, lie down flat and press firmly on/above the site for 10 minutes.   Once bleeding stops, lay flat for 2 hours.   Call [facility] as soon as you can.       Call 911 right away if you have heavy bleeding or bleeding that does not stop.      Medicines:    If you are taking an antiplatelet medication such as Plavix, Brilinta or Effient, do not stop taking it until you talk to your cardiologist.    Take your medications, including blood thinners, unless your provider tells you not to.    If you have stopped any medicines, check with your provider about when to restart them.    Follow Up Appointments:    Follow up with [facility] Nurse Practitioner at [facility] of  patient preference in 7-10 days.    Call the clinic if:    You have increased pain or a large or growing hard lump around the site.  The site is red, swollen, hot or tender.  Blood or fluid is draining from the site.  You have chills or a fever greater than 101 F (38 C).  Your leg feels numb, cool or changes color.  You have hives, a rash or unusual itching.  New pain in the back or belly that you cannot control with Tylenol.  Any questions or concerns.          [facility]:    [phone] UM (7 days a week)

## 2023-05-17 NOTE — Clinical Note
Hemodynamic equipment used: 5 lead ECG, GekkoK With 3 Leads, Machine BP Cuff and pulse oximeter probe.

## 2023-05-17 NOTE — PROGRESS NOTES
Care Suites Admission Nursing Note    Patient Information  Name: Chris Barriga  Age: 62 year old  Reason for admission: coronary angiogram with possible PCI  Care Suites arrival time: 0930    Visitor Information  Name: Merle, wife  Informed of visitor restrictions: Yes  2 visitor allowed per patient   Visitor must wear a mask    Patient Admission/Assessment   Pre-procedure assessment complete: Yes  If abnormal assessment/labs, provider notified: N/A, K+ 3.7, replaced as ordered  NPO: Yes  Medications held per instructions/orders: N/A  Consent: obtained  If applicable, pregnancy test status: deferred  Patient oriented to room: Yes  Education/questions answered: Yes  Plan/other: proceed as planned    Discharge Planning  Discharge name/phone number: Merle, spouse [phone]  Overnight post sedation caregiver: same  Discharge location: home    Junie Gustafson RN

## 2023-05-19 ENCOUNTER — MYC MEDICAL ADVICE (OUTPATIENT)
Dept: CARDIOLOGY | Facility: CLINIC | Age: 63
End: 2023-05-19
Payer: COMMERCIAL

## 2023-05-19 DIAGNOSIS — I10 BENIGN ESSENTIAL HYPERTENSION: Primary | ICD-10-CM

## 2023-05-19 LAB
ATRIAL RATE - MUSE: 65 BPM
DIASTOLIC BLOOD PRESSURE - MUSE: NORMAL MMHG
INTERPRETATION ECG - MUSE: NORMAL
P AXIS - MUSE: 61 DEGREES
PR INTERVAL - MUSE: 182 MS
QRS DURATION - MUSE: 112 MS
QT - MUSE: 412 MS
QTC - MUSE: 428 MS
R AXIS - MUSE: 42 DEGREES
SYSTOLIC BLOOD PRESSURE - MUSE: NORMAL MMHG
T AXIS - MUSE: 51 DEGREES
VENTRICULAR RATE- MUSE: 65 BPM

## 2023-05-19 RX ORDER — AMLODIPINE BESYLATE 5 MG/1
5 TABLET ORAL DAILY
Qty: 90 TABLET | Refills: 4 | Status: SHIPPED | OUTPATIENT
Start: 2023-05-19

## 2023-05-19 NOTE — TELEPHONE ENCOUNTER
"My chart message from patient:  Dr. Park:  Following up from my angiogram results from Wednesday, 5/17: it appears that my heart / stents are preforming similarly from 2 years ago, which seems like good news.  However, the same symptoms of shortness of breath, periodic chest pain, etc. persist.  I went for a walk this morning and sounded like I had run a marathon.   I'd like to hear what your suggestions are for next steps to understand what might be causing this, is it ok for me to start exercising again, and a few related items.  Let me know your thoughts.  I am not scheduled to see anyone until 6/7 when I have my post surgery follow up with Ashok.      Thanks     Chris Linus [phone]    Will message Dr. Park to review    1320 reply from Dr. Park:  Jan Park MD Anderson, Luci HUNT, RN  Phone Number: [phone]     I am out of town so I can t review the angiogram personally but will when I get back in town. At this time it s OK to exercise. Looking at your stress test you were quite hypertensive to start and had a hypertensive response to exercise. I will focus on that. I will start amlodipine 5 mg daily. We should also do a renal ultrasound to look for renal artery stenosis. Thank you     My chart reply sent to patient:    Mr. Linus Romo,    Dr. Park replied, \"I am out of town so I can t review the angiogram personally but will when I get back in town. At this time it s OK to exercise. Looking at your stress test you were quite hypertensive to start and had a hypertensive response to exercise. I will focus on that. I will start amlodipine 5 mg daily. We should also do a renal ultrasound to look for renal artery stenosis.\"    We sent a script to your [facility] pharmacy to try the medication, amlodipine. Let us know how your BP responds to this and bring some readings to your office visit with GERONIMO Tyson Bennett on 6/9/2023.    We placed an order for the US renal study. " Please call scheduling at [phone] to set up that appointment.    Thank you  Team 2 RNs  [phone]

## 2023-06-07 ENCOUNTER — HOSPITAL ENCOUNTER (OUTPATIENT)
Dept: ULTRASOUND IMAGING | Facility: CLINIC | Age: 63
Discharge: HOME | End: 2023-06-07
Attending: INTERNAL MEDICINE | Admitting: INTERNAL MEDICINE
Payer: COMMERCIAL

## 2023-06-07 DIAGNOSIS — I10 BENIGN ESSENTIAL HYPERTENSION: ICD-10-CM

## 2023-06-07 PROCEDURE — 93975 VASCULAR STUDY: CPT | Mod: 26 | Performed by: INTERNAL MEDICINE

## 2023-06-07 PROCEDURE — 76770 US EXAM ABDO BACK WALL COMP: CPT | Mod: 26 | Performed by: INTERNAL MEDICINE

## 2023-06-07 PROCEDURE — 76770 US EXAM ABDO BACK WALL COMP: CPT | Mod: XU

## 2023-06-08 ENCOUNTER — TELEPHONE (OUTPATIENT)
Dept: CARDIOLOGY | Facility: CLINIC | Age: 63
End: 2023-06-08
Payer: COMMERCIAL

## 2023-06-08 NOTE — TELEPHONE ENCOUNTER
----- Message from Jan Park MD sent at 6/7/2023  5:50 PM CDT -----  Renal ultrasound looks good.  How is his exercise tolerance doing?    Spoke to patient, reviewed renal US and message from Dr Park. Regarding exercise, he says his symptoms have not changed. He still gets chest pain and shortness of breath. The chest pain comes and goes all day long, not necessarily associated with activity. Describes it has a heaviness/ache. He walks/bikes/lifts weights for exercise. He feels like his shortness of breath is disproportionate to his activity. Its more the breathing that stops him from certain activities/exercise, not the chest pain. He doesn't know how hard to push himself. Patient is seeing [facility] tomorrow for follow up, routed to Dr Park to review.

## 2023-06-09 ENCOUNTER — OFFICE VISIT (OUTPATIENT)
Dept: CARDIOLOGY | Facility: CLINIC | Age: 63
End: 2023-06-09
Payer: COMMERCIAL

## 2023-06-09 VITALS
HEART RATE: 74 BPM | DIASTOLIC BLOOD PRESSURE: 78 MMHG | HEIGHT: 71 IN | WEIGHT: 190 LBS | BODY MASS INDEX: 26.6 KG/M2 | SYSTOLIC BLOOD PRESSURE: 134 MMHG | OXYGEN SATURATION: 100 %

## 2023-06-09 DIAGNOSIS — R06.02 SHORTNESS OF BREATH: ICD-10-CM

## 2023-06-09 DIAGNOSIS — I25.118 CORONARY ARTERY DISEASE OF NATIVE ARTERY OF NATIVE HEART WITH STABLE ANGINA PECTORIS (H): ICD-10-CM

## 2023-06-09 PROCEDURE — 99215 OFFICE O/P EST HI 40 MIN: CPT | Performed by: PHYSICIAN ASSISTANT

## 2023-06-09 NOTE — LETTER
6/9/2023    Bharathi Chowdary MD  [address]  [facility] [address]    RE: Chris Barriga       Dear Colleague,     I had the pleasure of seeing Chris Barriga in the [facility].  Primary Cardiologist: Dr. Park    Reason for Visit: Post Angiogram follow up    History of Present Illness:   Chava is a very pleasant 62-year-old male with past medical history notable for coronary artery disease (proximal LAD artery stenting in 2012; complex stenting of very angulated OM1 marginal requiring stents to OM, proximal and mid LCX arteries in 7/2020), hypertension, hyperlipidemia, and mild aortic root dilatation.    Chava recently saw Dr. Park and at that time he had reports of dyspnea exertion, exercise intolerance, and chest discomfort.  He was arranged for left heart catheterization.  This showed patency of the previously placed stents with no significant disease elsewhere.      Chava returns to clinic today stating he continues to have similar symptoms of chest discomfort and shortness of breath.  He does take nitroglycerin occasionally which helps with his symptoms.    He was also arrange for renal ultrasound for elevated blood pressures recently.  He tells me his been checking his blood pressure daily as well. He denies any pain at the femoral arteriotomy site.    Assessment and Plan:  Chava is a very pleasant 62-year-old male with past medical history notable for coronary artery disease (proximal LAD artery stenting in 2012; complex stenting of very angulated OM1 marginal requiring stents to OM, proximal and mid LCX arteries in 7/2020), hypertension, hyperlipidemia, and mild aortic root dilatation.    Unfortunately Chava continues to have similar symptoms.  His angiogram images were reviewed by me in detail today.  Dr. Park also reviewed these images and felt that he may have moderate ostial LAD lesion that could be significant.  He recommended IFR measurement of this lesion with repeat  coronary angiogram with Dr. Morales or Dr. Fontana.  I have reviewed these recommendations with Chava today who stated he would like to discuss this with his spouse.    His renal ultrasound showed no signs of renal artery stenosis.  His blood pressure log shows that his blood pressure is under good control with average systolic of 120s.    Risks and benefits of left heart catheterization and coronary angiogram were discussed with the patient in detail. 0.1-0.3% (for diagnostic angio) and 1-2% (for PCI)  risk of stroke, MI, death, emergent bypass for diagnostic angio, risk of contrast induced allergic reaction, renal dysfunction, vascular complications were discussed.     No history of bleeding problems or current bleeding, and no scheduled surgeries or procedures in the next year. Patient understands and wishes to proceed with it.    50 minutes spent on the date of the encounter with chart review, patient visit, care coordination, and documentation.      This note was completed in part using Dragon voice recognition software. Although reviewed after completion, some word and grammatical errors may occur.    Orders this Visit:  No orders of the defined types were placed in this encounter.    No orders of the defined types were placed in this encounter.    There are no discontinued medications.      Encounter Diagnoses   Name Primary?    Coronary artery disease of native artery of native heart with stable angina pectoris (H)     Shortness of breath        CURRENT MEDICATIONS:  Current Outpatient Medications   Medication Sig Dispense Refill    acetaminophen (TYLENOL) 325 MG tablet Take 2 tablets (650 mg) by mouth every 4 hours as needed for mild pain or headaches      amLODIPine (NORVASC) 5 MG tablet Take 1 tablet (5 mg) by mouth daily 90 tablet 4    atorvastatin (LIPITOR) 20 MG tablet Take 1 tablet (20 mg) by mouth daily 90 tablet 3    clopidogrel (PLAVIX) 75 MG tablet Take 4 tablets (300mg) by mouth the first day and then  1 tablet (75mg) by mouth daily thereafter. (Patient taking differently: Take 75 mg by mouth daily Take 4 tablets (300mg) by mouth the first day and then 1 tablet (75mg) by mouth daily thereafter.) 90 tablet 3    coenzyme Q-10 capsule Take 1 capsule by mouth daily      doxycycline hyclate (VIBRA-TABS) 100 MG tablet Take 100 mg by mouth 2 times daily Taking as needed      Flaxseed, Linseed, (FLAX SEED OIL) 1000 MG capsule Take 1 capsule by mouth daily      glucosamine-chondroitin 500-400 MG CAPS Take 2 capsules by mouth daily       hydrocortisone (WESTCORT) 0.2 % cream Apply topically 2 times daily as needed       losartan-hydrochlorothiazide (HYZAAR) 100-25 MG tablet Take 1 tablet by mouth daily 90 tablet 4    Lysine 500 MG TABS Take 1 tablet by mouth daily.      magnesium 500 MG TABS Take 500 mg by mouth every evening      metoprolol succinate ER (TOPROL XL) 25 MG 24 hr tablet Take 1 tablet (25 mg) by mouth daily 90 tablet 4    Multiple Vitamin (MULTI-VITAMIN) per tablet Take 1 tablet by mouth daily.      nitroGLYcerin (NITROSTAT) 0.4 MG sublingual tablet Place 1 tablet (0.4 mg) under the tongue every 5 minutes as needed for chest pain Administer every 5 minutes as needed.  Maximum 3 doses in 15 minutes. 30 tablet 0    Potassium Gluconate 595 (99 K) MG TABS       ramelteon (ROZEREM) 8 MG tablet       sildenafil (VIAGRA) 50 MG tablet TAKE 1 TABLET BEFORE SEXUAL ACTIVITY. MAX 100MG/24HRS      zinc gluconate 50 MG tablet       loratadine 10 MG capsule Take 10 mg by mouth daily (Patient not taking: Reported on 6/9/2023)         ALLERGIES     Allergies   Allergen Reactions    Seasonal Allergies     Crestor [Rosuvastatin] Muscle Pain (Myalgia)    Hydrocodone-Acetaminophen Other (See Comments)     FEELS WIRED  FEELS WIRED  Hyperactive, insomnia.       Molds & Smuts        PAST MEDICAL HISTORY:  Past Medical History:   Diagnosis Date    Adenoma of ascending colon     Aortic root dilation (H)     Chest pain     Coronary  artery disease     5/2012: Stent to proximal LAD for unstable angina. Proximal left circumflex 30-40%. EF 60%    HTN (hypertension)     Hyperlipidaemia     Iliac artery aneurysm, left (H)        PAST SURGICAL HISTORY:  Past Surgical History:   Procedure Laterality Date    APPENDECTOMY OPEN      ARTHROSCOPY KNEE BILATERAL      both knees    ARTHROSCOPY SHOULDER      left    CARDIAC SURGERY      stent x 1    COLONOSCOPY N/A 2/17/2017    Procedure: COLONOSCOPY;  Surgeon: Junie Grier MD;  Location:  OR    COLONOSCOPY  12/2016    [place] [facility] [place]    COLONOSCOPY  05/22/2017    Dr. Grier [facility]    COLONOSCOPY N/A 5/29/2018    Procedure: COLONOSCOPY;  colonoscopy (crsal);  Surgeon: Junie Grier MD;  Location:  GI    CV CORONARY ANGIOGRAM N/A 5/17/2023    Procedure: Coronary Angiogram;  Surgeon: Mitchell Morales MD;  Location:  HEART CARDIAC CATH LAB    CV HEART CATHETERIZATION WITH POSSIBLE INTERVENTION N/A 7/28/2021    Procedure: Heart Catheterization with Possible Intervention;  Surgeon: Buddy Martin MD;  Location:  HEART CARDIAC CATH LAB    CV INTRAVASULAR ULTRASOUND N/A 7/30/2021    Procedure: Intravascular Ultrasound;  Surgeon: Mitchell Morales MD;  Location:  HEART CARDIAC CATH LAB    CV PCI STENT DRUG ELUTING N/A 7/30/2021    Procedure: Percutaneous Coronary Intervention Stent Drug Eluting;  Surgeon: Mitchell Morales MD;  Location:  HEART CARDIAC CATH LAB    LAPAROSCOPIC HERNIORRHAPHY INGUINAL  12/30/2013    Procedure: LAPAROSCOPIC HERNIORRHAPHY INGUINAL;  LAPAROSCOPIC RIGHT INGUINAL HERNIA REPAIR WITH MESH;  Surgeon: Reji Pizano MD;  Location:  [facility]    ORTHOPEDIC SURGERY      scope of both shoulders    VASECTOMY      ZZC ORAL SURGERY PROCEDURE         FAMILY HISTORY:  Family History   Problem Relation Age of Onset    Breast Cancer Mother     Cerebrovascular Disease Father     Breast Cancer Maternal Grandmother     Breast Cancer Son     Colon  "Cancer No family hx of        SOCIAL HISTORY:  Social History     Socioeconomic History    Marital status:      Spouse name: None    Number of children: None    Years of education: None    Highest education level: None   Tobacco Use    Smoking status: Never    Smokeless tobacco: Never   Vaping Use    Vaping status: Never Used   Substance and Sexual Activity    Alcohol use: Yes     Comment: 1 glass of wine occasionally    Drug use: No       Review of Systems:  Skin:        Eyes:       ENT:       Respiratory:  Positive for shortness of breath  Cardiovascular:  Negative for;palpitations;edema;lightheadedness;dizziness;fatigue;syncope or near-syncope Positive for;chest pain  Gastroenterology:      Genitourinary:       Musculoskeletal:       Neurologic:       Psychiatric:       Heme/Lymph/Imm:       Endocrine:  Negative      Physical Exam:  Vitals: /78   Pulse 74   Ht 1.803 m (5' 11\")   Wt 86.2 kg (190 lb)   SpO2 100%   BMI 26.50 kg/m       GEN:  NAD  NECK: nO JVD  C/V:  Regular rate and rhythm, no murmur, rub or gallop.  RESP: Clear to auscultation bilaterally without wheezing, rales, or rhonchi.  GI: Abdomen soft, nontender, nondistended. No HSM appreciated.   EXTREM: No pitting LE edema.   NEURO: Alert and oriented, cooperative. No obvious focal deficits.   PSYCH: Normal affect.  SKIN: Warm and dry.   VASC: 2+ DP pulses bilaterally.       Recent Lab Results:  LIPID RESULTS:  Lab Results   Component Value Date    CHOL 126 04/05/2023    CHOL 147 01/21/2021    HDL 60 04/05/2023    HDL 91 01/21/2021    LDL 51 04/05/2023    LDL 35 01/21/2021    TRIG 77 04/05/2023    TRIG 107 01/21/2021    CHOLHDLRATIO 1.7 08/20/2012       LIVER ENZYME RESULTS:  Lab Results   Component Value Date    AST 21 09/04/2018    ALT 23 04/05/2023    ALT 24 09/17/2020       CBC RESULTS:  Lab Results   Component Value Date    WBC 6.0 05/17/2023    WBC 5.5 08/21/2017    RBC 4.48 05/17/2023    RBC 4.81 08/21/2017    HGB 14.8 05/17/2023 "    HGB 15.4 08/21/2017    HCT 42.3 05/17/2023    HCT 44.3 08/21/2017    MCV 94 05/17/2023    MCV 92 08/21/2017    MCH 33.0 05/17/2023    MCH 32 08/21/2017    MCHC 35.0 05/17/2023    MCHC 34.8 08/21/2017    RDW 13.4 05/17/2023    RDW 13 08/21/2017     05/17/2023     08/21/2017       BMP RESULTS:  Lab Results   Component Value Date     05/17/2023     01/21/2021    POTASSIUM 3.7 05/17/2023    POTASSIUM 3.6 03/01/2022    POTASSIUM 4.2 01/21/2021    CHLORIDE 104 05/17/2023    CHLORIDE 105 03/01/2022    CHLORIDE 106 01/21/2021    CO2 23 05/17/2023    CO2 27 03/01/2022    CO2 30 01/21/2021    ANIONGAP 12 05/17/2023    ANIONGAP 5 03/01/2022    ANIONGAP 2 (L) 01/21/2021    GLC 97 05/17/2023     (H) 03/01/2022    GLC 97 01/21/2021    BUN 21.8 05/17/2023    BUN 20 03/01/2022    BUN 20 01/21/2021    CR 0.80 05/17/2023    CR 0.90 01/21/2021    GFRESTIMATED >90 05/17/2023    GFRESTIMATED >90 01/21/2021    GFRESTBLACK >90 01/21/2021    DANY 8.9 05/17/2023    DANY 8.9 01/21/2021        A1C RESULTS:  Lab Results   Component Value Date    A1C 5.0 07/28/2021       INR RESULTS:  Lab Results   Component Value Date    INR 0.95 05/17/2023    INR 0.97 07/30/2021    INR 0.89 05/25/2012           Tyson Bennett PA-C, [place]   June 9, 2023         Thank you for allowing me to participate in the care of your patient.      Sincerely,     Tyson Bennett PA-C     [facility]  cc:   Jan Park MD  [address]  [place] [id number]

## 2023-06-09 NOTE — TELEPHONE ENCOUNTER
Message routed to Tyson Bennett who is seeing the patient today at 0900.     Jan Park MD  You 11 hours ago (9:47 PM)     I Looked at his angiogram.  He does have a moderate ostial LAD lesion I would like him to go back to the Cath Lab for an IFR measurement.  It may be significant.  Schedule him for either [place] again or Addi

## 2023-06-09 NOTE — PATIENT INSTRUCTIONS
Today's Plan:   1) Schedule angiogram with Dr. Morales or Dr. Fontana to look at an area in the LAD artery per Dr. Park.   2) Schedule follow up visit after your angiogram with non-fasting blood work.    If you have questions or concerns please call my nurse team at [phone]    Scheduling phone number: [phone]  Reminder: Please bring in all current medications, over the counter supplements and vitamin bottles to your next appointment.    It was a pleasure seeing you today!

## 2023-06-09 NOTE — PROGRESS NOTES
Primary Cardiologist: Dr. Park    Reason for Visit: Post Angiogram follow up    History of Present Illness:   Chava is a very pleasant 62-year-old male with past medical history notable for coronary artery disease (proximal LAD artery stenting in 2012; complex stenting of very angulated OM1 marginal requiring stents to OM, proximal and mid LCX arteries in 7/2020), hypertension, hyperlipidemia, and mild aortic root dilatation.    Chava recently saw Dr. Park and at that time he had reports of dyspnea exertion, exercise intolerance, and chest discomfort.  He was arranged for left heart catheterization.  This showed patency of the previously placed stents with no significant disease elsewhere.    Chava returns to clinic today stating he continues to have similar symptoms of chest discomfort and shortness of breath.  He does take nitroglycerin occasionally which helps with his symptoms.    He was also arrange for renal ultrasound for elevated blood pressures recently.  He tells me his been checking his blood pressure daily as well. He denies any pain at the femoral arteriotomy site.    Assessment and Plan:  Chava is a very pleasant 62-year-old male with past medical history notable for coronary artery disease (proximal LAD artery stenting in 2012; complex stenting of very angulated OM1 marginal requiring stents to OM, proximal and mid LCX arteries in 7/2020), hypertension, hyperlipidemia, and mild aortic root dilatation.    Unfortunately Chava continues to have similar symptoms.  His angiogram images were reviewed by me in detail today.  Dr. Park also reviewed these images and felt that he may have moderate ostial LAD lesion that could be significant.  He recommended IFR measurement of this lesion with repeat coronary angiogram with Dr. Morales or Dr. Fontana.  I have reviewed these recommendations with Chava today who stated he would like to discuss this with his spouse.    His renal ultrasound showed no signs of renal  artery stenosis.  His blood pressure log shows that his blood pressure is under good control with average systolic of 120s.    Risks and benefits of left heart catheterization and coronary angiogram were discussed with the patient in detail. 0.1-0.3% (for diagnostic angio) and 1-2% (for PCI)  risk of stroke, MI, death, emergent bypass for diagnostic angio, risk of contrast induced allergic reaction, renal dysfunction, vascular complications were discussed.     No history of bleeding problems or current bleeding, and no scheduled surgeries or procedures in the next year. Patient understands and wishes to proceed with it.    50 minutes spent on the date of the encounter with chart review, patient visit, care coordination, and documentation.      This note was completed in part using Dragon voice recognition software. Although reviewed after completion, some word and grammatical errors may occur.    Orders this Visit:  No orders of the defined types were placed in this encounter.    No orders of the defined types were placed in this encounter.    There are no discontinued medications.      Encounter Diagnoses   Name Primary?     Coronary artery disease of native artery of native heart with stable angina pectoris (H)      Shortness of breath        CURRENT MEDICATIONS:  Current Outpatient Medications   Medication Sig Dispense Refill     acetaminophen (TYLENOL) 325 MG tablet Take 2 tablets (650 mg) by mouth every 4 hours as needed for mild pain or headaches       amLODIPine (NORVASC) 5 MG tablet Take 1 tablet (5 mg) by mouth daily 90 tablet 4     atorvastatin (LIPITOR) 20 MG tablet Take 1 tablet (20 mg) by mouth daily 90 tablet 3     clopidogrel (PLAVIX) 75 MG tablet Take 4 tablets (300mg) by mouth the first day and then 1 tablet (75mg) by mouth daily thereafter. (Patient taking differently: Take 75 mg by mouth daily Take 4 tablets (300mg) by mouth the first day and then 1 tablet (75mg) by mouth daily thereafter.) 90  tablet 3     coenzyme Q-10 capsule Take 1 capsule by mouth daily       doxycycline hyclate (VIBRA-TABS) 100 MG tablet Take 100 mg by mouth 2 times daily Taking as needed       Flaxseed, Linseed, (FLAX SEED OIL) 1000 MG capsule Take 1 capsule by mouth daily       glucosamine-chondroitin 500-400 MG CAPS Take 2 capsules by mouth daily        hydrocortisone (WESTCORT) 0.2 % cream Apply topically 2 times daily as needed        losartan-hydrochlorothiazide (HYZAAR) 100-25 MG tablet Take 1 tablet by mouth daily 90 tablet 4     Lysine 500 MG TABS Take 1 tablet by mouth daily.       magnesium 500 MG TABS Take 500 mg by mouth every evening       metoprolol succinate ER (TOPROL XL) 25 MG 24 hr tablet Take 1 tablet (25 mg) by mouth daily 90 tablet 4     Multiple Vitamin (MULTI-VITAMIN) per tablet Take 1 tablet by mouth daily.       nitroGLYcerin (NITROSTAT) 0.4 MG sublingual tablet Place 1 tablet (0.4 mg) under the tongue every 5 minutes as needed for chest pain Administer every 5 minutes as needed.  Maximum 3 doses in 15 minutes. 30 tablet 0     Potassium Gluconate 595 (99 K) MG TABS        ramelteon (ROZEREM) 8 MG tablet        sildenafil (VIAGRA) 50 MG tablet TAKE 1 TABLET BEFORE SEXUAL ACTIVITY. MAX 100MG/24HRS       zinc gluconate 50 MG tablet        loratadine 10 MG capsule Take 10 mg by mouth daily (Patient not taking: Reported on 6/9/2023)         ALLERGIES     Allergies   Allergen Reactions     Seasonal Allergies      Crestor [Rosuvastatin] Muscle Pain (Myalgia)     Hydrocodone-Acetaminophen Other (See Comments)     FEELS WIRED  FEELS WIRED  Hyperactive, insomnia.        Molds & Smuts        PAST MEDICAL HISTORY:  Past Medical History:   Diagnosis Date     Adenoma of ascending colon      Aortic root dilation (H)      Chest pain      Coronary artery disease     5/2012: Stent to proximal LAD for unstable angina. Proximal left circumflex 30-40%. EF 60%     HTN (hypertension)      Hyperlipidaemia      Iliac artery  aneurysm, left (H)        PAST SURGICAL HISTORY:  Past Surgical History:   Procedure Laterality Date     APPENDECTOMY OPEN       ARTHROSCOPY KNEE BILATERAL      both knees     ARTHROSCOPY SHOULDER      left     CARDIAC SURGERY      stent x 1     COLONOSCOPY N/A 2/17/2017    Procedure: COLONOSCOPY;  Surgeon: Junie Grier MD;  Location:  OR     COLONOSCOPY  12/2016    [facility] [place]     COLONOSCOPY  05/22/2017    Dr. Grier [facility]     COLONOSCOPY N/A 5/29/2018    Procedure: COLONOSCOPY;  colonoscopy (crsal);  Surgeon: Junie Grier MD;  Location:  GI     CV CORONARY ANGIOGRAM N/A 5/17/2023    Procedure: Coronary Angiogram;  Surgeon: Mitchell Morales MD;  Location:  HEART CARDIAC CATH LAB     CV HEART CATHETERIZATION WITH POSSIBLE INTERVENTION N/A 7/28/2021    Procedure: Heart Catheterization with Possible Intervention;  Surgeon: Buddy Martin MD;  Location:  HEART CARDIAC CATH LAB     CV INTRAVASULAR ULTRASOUND N/A 7/30/2021    Procedure: Intravascular Ultrasound;  Surgeon: Mitchell Morales MD;  Location:  HEART CARDIAC CATH LAB     CV PCI STENT DRUG ELUTING N/A 7/30/2021    Procedure: Percutaneous Coronary Intervention Stent Drug Eluting;  Surgeon: Mitchell Morales MD;  Location:  HEART CARDIAC CATH LAB     LAPAROSCOPIC HERNIORRHAPHY INGUINAL  12/30/2013    Procedure: LAPAROSCOPIC HERNIORRHAPHY INGUINAL;  LAPAROSCOPIC RIGHT INGUINAL HERNIA REPAIR WITH MESH;  Surgeon: Reji Pizano MD;  Location: [facility]     ORTHOPEDIC SURGERY      scope of both shoulders     VASECTOMY       ZZC ORAL SURGERY PROCEDURE         FAMILY HISTORY:  Family History   Problem Relation Age of Onset     Breast Cancer Mother      Cerebrovascular Disease Father      Breast Cancer Maternal Grandmother      Breast Cancer Son      Colon Cancer No family hx of        SOCIAL HISTORY:  Social History     Socioeconomic History     Marital status:      Spouse name: None     Number  "of children: None     Years of education: None     Highest education level: None   Tobacco Use     Smoking status: Never     Smokeless tobacco: Never   Vaping Use     Vaping status: Never Used   Substance and Sexual Activity     Alcohol use: Yes     Comment: 1 glass of wine occasionally     Drug use: No       Review of Systems:  Skin:        Eyes:       ENT:       Respiratory:  Positive for shortness of breath  Cardiovascular:  Negative for;palpitations;edema;lightheadedness;dizziness;fatigue;syncope or near-syncope Positive for;chest pain  Gastroenterology:      Genitourinary:       Musculoskeletal:       Neurologic:       Psychiatric:       Heme/Lymph/Imm:       Endocrine:  Negative      Physical Exam:  Vitals: /78   Pulse 74   Ht 1.803 m (5' 11\")   Wt 86.2 kg (190 lb)   SpO2 100%   BMI 26.50 kg/m       GEN:  NAD  NECK: nO JVD  C/V:  Regular rate and rhythm, no murmur, rub or gallop.  RESP: Clear to auscultation bilaterally without wheezing, rales, or rhonchi.  GI: Abdomen soft, nontender, nondistended. No HSM appreciated.   EXTREM: No pitting LE edema.   NEURO: Alert and oriented, cooperative. No obvious focal deficits.   PSYCH: Normal affect.  SKIN: Warm and dry.   VASC: 2+ DP pulses bilaterally.       Recent Lab Results:  LIPID RESULTS:  Lab Results   Component Value Date    CHOL 126 04/05/2023    CHOL 147 01/21/2021    HDL 60 04/05/2023    HDL 91 01/21/2021    LDL 51 04/05/2023    LDL 35 01/21/2021    TRIG 77 04/05/2023    TRIG 107 01/21/2021    CHOLHDLRATIO 1.7 08/20/2012       LIVER ENZYME RESULTS:  Lab Results   Component Value Date    AST 21 09/04/2018    ALT 23 04/05/2023    ALT 24 09/17/2020       CBC RESULTS:  Lab Results   Component Value Date    WBC 6.0 05/17/2023    WBC 5.5 08/21/2017    RBC 4.48 05/17/2023    RBC 4.81 08/21/2017    HGB 14.8 05/17/2023    HGB 15.4 08/21/2017    HCT 42.3 05/17/2023    HCT 44.3 08/21/2017    MCV 94 05/17/2023    MCV 92 08/21/2017    MCH 33.0 05/17/2023    " MCH 32 08/21/2017    MCHC 35.0 05/17/2023    MCHC 34.8 08/21/2017    RDW 13.4 05/17/2023    RDW 13 08/21/2017     05/17/2023     08/21/2017       BMP RESULTS:  Lab Results   Component Value Date     05/17/2023     01/21/2021    POTASSIUM 3.7 05/17/2023    POTASSIUM 3.6 03/01/2022    POTASSIUM 4.2 01/21/2021    CHLORIDE 104 05/17/2023    CHLORIDE 105 03/01/2022    CHLORIDE 106 01/21/2021    CO2 23 05/17/2023    CO2 27 03/01/2022    CO2 30 01/21/2021    ANIONGAP 12 05/17/2023    ANIONGAP 5 03/01/2022    ANIONGAP 2 (L) 01/21/2021    GLC 97 05/17/2023     (H) 03/01/2022    GLC 97 01/21/2021    BUN 21.8 05/17/2023    BUN 20 03/01/2022    BUN 20 01/21/2021    CR 0.80 05/17/2023    CR 0.90 01/21/2021    GFRESTIMATED >90 05/17/2023    GFRESTIMATED >90 01/21/2021    GFRESTBLACK >90 01/21/2021    DANY 8.9 05/17/2023    DANY 8.9 01/21/2021        A1C RESULTS:  Lab Results   Component Value Date    A1C 5.0 07/28/2021       INR RESULTS:  Lab Results   Component Value Date    INR 0.95 05/17/2023    INR 0.97 07/30/2021    INR 0.89 05/25/2012           Tyson Bennett PA-C, [place]   June 9, 2023

## 2023-06-21 DIAGNOSIS — R06.02 SHORTNESS OF BREATH: Primary | ICD-10-CM

## 2023-06-21 DIAGNOSIS — I25.10 CORONARY ARTERY DISEASE INVOLVING NATIVE CORONARY ARTERY OF NATIVE HEART WITHOUT ANGINA PECTORIS: ICD-10-CM

## 2023-06-21 RX ORDER — CLOPIDOGREL BISULFATE 75 MG/1
TABLET ORAL
Qty: 90 TABLET | Refills: 3 | Status: SHIPPED | OUTPATIENT
Start: 2023-06-21

## 2023-06-22 ENCOUNTER — TELEPHONE (OUTPATIENT)
Dept: PULMONOLOGY | Facility: CLINIC | Age: 63
End: 2023-06-22
Payer: COMMERCIAL

## 2023-07-06 NOTE — CONFIDENTIAL NOTE
RECORDS RECEIVED FROM: internal /ce   DATE RECEIVED: 9/8/23    NOTES STATUS DETAILS   OFFICE NOTE from referring provider internal  Self referred     OFFICE NOTE from other specialist internal  6.9.23 Donald      DISCHARGE SUMMARY from hospital     DISCHARGE REPORT from the ER     MEDICATION LIST internal     IMAGING  (NEED IMAGES AND REPORTS)     CT SCAN     CHEST XRAY (CXR) internal - Scheduled 9/8/23   Internal- 7/27/21     [facility]-6.13.23     TESTS     PULMONARY FUNCTION TESTING (PFT) internal - Scheduled 9/8/23        Action 9/8/23 SV   Action Taken Image request sent to [facility] for   CXR- 9/8/23

## 2023-09-08 ENCOUNTER — PRE VISIT (OUTPATIENT)
Dept: PULMONOLOGY | Facility: CLINIC | Age: 63
End: 2023-09-08

## 2023-09-13 ENCOUNTER — APPOINTMENT (OUTPATIENT)
Dept: URBAN - METROPOLITAN AREA CLINIC 256 | Age: 63
Setting detail: DERMATOLOGY
End: 2023-09-13

## 2023-09-13 VITALS — HEIGHT: 73 IN | WEIGHT: 180 LBS

## 2023-09-13 DIAGNOSIS — Z85.828 PERSONAL HISTORY OF OTHER MALIGNANT NEOPLASM OF SKIN: ICD-10-CM

## 2023-09-13 DIAGNOSIS — L73.8 OTHER SPECIFIED FOLLICULAR DISORDERS: ICD-10-CM

## 2023-09-13 DIAGNOSIS — L08.89 OTHER SPECIFIED LOCAL INFECTIONS OF THE SKIN AND SUBCUTANEOUS TISSUE: ICD-10-CM

## 2023-09-13 DIAGNOSIS — D18.0 HEMANGIOMA: ICD-10-CM

## 2023-09-13 DIAGNOSIS — L82.1 OTHER SEBORRHEIC KERATOSIS: ICD-10-CM

## 2023-09-13 DIAGNOSIS — D22 MELANOCYTIC NEVI: ICD-10-CM

## 2023-09-13 DIAGNOSIS — L70.2 ACNE VARIOLIFORMIS: ICD-10-CM

## 2023-09-13 DIAGNOSIS — Z71.89 OTHER SPECIFIED COUNSELING: ICD-10-CM

## 2023-09-13 DIAGNOSIS — L57.8 OTHER SKIN CHANGES DUE TO CHRONIC EXPOSURE TO NONIONIZING RADIATION: ICD-10-CM

## 2023-09-13 PROBLEM — D22.5 MELANOCYTIC NEVI OF TRUNK: Status: ACTIVE | Noted: 2023-09-13

## 2023-09-13 PROBLEM — D22.61 MELANOCYTIC NEVI OF RIGHT UPPER LIMB, INCLUDING SHOULDER: Status: ACTIVE | Noted: 2023-09-13

## 2023-09-13 PROBLEM — D22.72 MELANOCYTIC NEVI OF LEFT LOWER LIMB, INCLUDING HIP: Status: ACTIVE | Noted: 2023-09-13

## 2023-09-13 PROBLEM — D22.39 MELANOCYTIC NEVI OF OTHER PARTS OF FACE: Status: ACTIVE | Noted: 2023-09-13

## 2023-09-13 PROBLEM — D22.71 MELANOCYTIC NEVI OF RIGHT LOWER LIMB, INCLUDING HIP: Status: ACTIVE | Noted: 2023-09-13

## 2023-09-13 PROBLEM — D18.01 HEMANGIOMA OF SKIN AND SUBCUTANEOUS TISSUE: Status: ACTIVE | Noted: 2023-09-13

## 2023-09-13 PROBLEM — D22.62 MELANOCYTIC NEVI OF LEFT UPPER LIMB, INCLUDING SHOULDER: Status: ACTIVE | Noted: 2023-09-13

## 2023-09-13 PROCEDURE — 99213 OFFICE O/P EST LOW 20 MIN: CPT

## 2023-09-13 PROCEDURE — OTHER COUNSELING: OTHER

## 2023-09-13 PROCEDURE — OTHER PRESCRIPTION: OTHER

## 2023-09-13 PROCEDURE — OTHER MIPS QUALITY: OTHER

## 2023-09-13 PROCEDURE — OTHER PRESCRIPTION MEDICATION MANAGEMENT: OTHER

## 2023-09-13 RX ORDER — DOXYCYCLINE HYCLATE 100 MG/1
100MG TABLET, COATED ORAL BID
Qty: 60 | Refills: 3 | Status: ERX | COMMUNITY
Start: 2023-09-13

## 2023-09-13 ASSESSMENT — LOCATION DETAILED DESCRIPTION DERM
LOCATION DETAILED: LEFT MEDIAL MALAR CHEEK
LOCATION DETAILED: LEFT INFERIOR PREAURICULAR CHEEK
LOCATION DETAILED: LEFT INFERIOR MEDIAL MIDBACK
LOCATION DETAILED: LEFT LATERAL ABDOMEN
LOCATION DETAILED: LEFT DISTAL POSTERIOR THIGH
LOCATION DETAILED: LEFT INFERIOR CENTRAL MALAR CHEEK
LOCATION DETAILED: LEFT VENTRAL PROXIMAL FOREARM
LOCATION DETAILED: LEFT DISTAL POSTERIOR UPPER ARM
LOCATION DETAILED: RIGHT SUPERIOR PARIETAL SCALP
LOCATION DETAILED: RIGHT DISTAL POSTERIOR THIGH
LOCATION DETAILED: RIGHT SUPERIOR HELIX
LOCATION DETAILED: LEFT ANTERIOR PROXIMAL THIGH
LOCATION DETAILED: RIGHT VENTRAL DISTAL FOREARM
LOCATION DETAILED: RIGHT CENTRAL MALAR CHEEK
LOCATION DETAILED: RIGHT DISTAL POSTERIOR UPPER ARM
LOCATION DETAILED: RIGHT ANTERIOR DISTAL THIGH

## 2023-09-13 ASSESSMENT — LOCATION SIMPLE DESCRIPTION DERM
LOCATION SIMPLE: LEFT LOWER BACK
LOCATION SIMPLE: SCALP
LOCATION SIMPLE: RIGHT CHEEK
LOCATION SIMPLE: RIGHT THIGH
LOCATION SIMPLE: RIGHT EAR
LOCATION SIMPLE: RIGHT UPPER ARM
LOCATION SIMPLE: ABDOMEN
LOCATION SIMPLE: RIGHT FOREARM
LOCATION SIMPLE: LEFT UPPER ARM
LOCATION SIMPLE: LEFT CHEEK
LOCATION SIMPLE: LEFT FOREARM
LOCATION SIMPLE: LEFT POSTERIOR THIGH
LOCATION SIMPLE: RIGHT POSTERIOR THIGH
LOCATION SIMPLE: LEFT THIGH

## 2023-09-13 ASSESSMENT — LOCATION ZONE DERM
LOCATION ZONE: TRUNK
LOCATION ZONE: LEG
LOCATION ZONE: EAR
LOCATION ZONE: ARM
LOCATION ZONE: FACE
LOCATION ZONE: SCALP

## 2023-09-13 NOTE — HPI: FULL BODY SKIN EXAMINATION
What Is The Reason For Today's Visit?: Full Body Skin Examination
What Is The Reason For Today's Visit? (Being Monitored For X): concerning skin lesions on an annual basis
Additional History: Left nose treat it pick at it bleeding\\nRx Doxy\\nTopical cream or ointment once a month large blemish due to shaving week or two to heal really sore

## 2023-09-13 NOTE — PROCEDURE: PRESCRIPTION MEDICATION MANAGEMENT
Render In Strict Bullet Format?: No
Initiate Treatment: Doxycycline 100mg BID
Detail Level: Zone
Plan: The patient was recommended to use OTC Benzoyl Peroxide Spot Gel on the affected areas.

## 2023-09-15 NOTE — PROGRESS NOTES
Received a call from interventionalist regarding inability to pass wire across the lesion in the OM due to significant tortuosity.  We decided to medically manage at this time and may consider repeat attempt at revascularization if anginal symptoms do not resolve with antianginals.  Discussed this with the patient and wife were in agreement.  Would recommend the followin.  Decrease losartan to 50 mg daily.  Nondiabetic and normal EF.  2.  Increase Norvasc to 5 mg daily.  3.  Can further down titrate losartan to facilitate up titration of Norvasc if symptoms continue to occur at this dose.  May also consider adding low-dose Imdur if symptoms do not improve with up titration of Norvasc.  4.  Stable for discharge from cardiac standpoint after post angiogram care is complete.    Adonay Delgado MD  Cardiology    Oral Minoxidil Counseling- I discussed with the patient the risks of oral minoxidil including but not limited to shortness of breath, swelling of the feet or ankles, dizziness, lightheadedness, unwanted hair growth and allergic reaction.  The patient verbalized understanding of the proper use and possible adverse effects of oral minoxidil.  All of the patient's questions and concerns were addressed.

## 2023-11-26 ENCOUNTER — HEALTH MAINTENANCE LETTER (OUTPATIENT)
Age: 63
End: 2023-11-26

## 2024-01-01 NOTE — Clinical Note
Procedure scheduled as Elective.   -It may be easier to cut the 's fingernails when the  is sleeping. Use a file until you can see that the skin is no longer attached to the nail.

## 2024-09-16 ENCOUNTER — RX ONLY (RX ONLY)
Age: 64
End: 2024-09-16

## 2024-09-16 ENCOUNTER — APPOINTMENT (OUTPATIENT)
Dept: URBAN - METROPOLITAN AREA CLINIC 256 | Age: 64
Setting detail: DERMATOLOGY
End: 2024-09-16

## 2024-09-16 DIAGNOSIS — D18.0 HEMANGIOMA: ICD-10-CM

## 2024-09-16 DIAGNOSIS — Z71.89 OTHER SPECIFIED COUNSELING: ICD-10-CM

## 2024-09-16 DIAGNOSIS — L57.8 OTHER SKIN CHANGES DUE TO CHRONIC EXPOSURE TO NONIONIZING RADIATION: ICD-10-CM

## 2024-09-16 DIAGNOSIS — D22 MELANOCYTIC NEVI: ICD-10-CM

## 2024-09-16 DIAGNOSIS — L70.2 ACNE VARIOLIFORMIS: ICD-10-CM

## 2024-09-16 DIAGNOSIS — D485 NEOPLASM OF UNCERTAIN BEHAVIOR OF SKIN: ICD-10-CM

## 2024-09-16 DIAGNOSIS — L82.1 OTHER SEBORRHEIC KERATOSIS: ICD-10-CM

## 2024-09-16 DIAGNOSIS — L57.0 ACTINIC KERATOSIS: ICD-10-CM

## 2024-09-16 PROBLEM — D22.5 MELANOCYTIC NEVI OF TRUNK: Status: ACTIVE | Noted: 2024-09-16

## 2024-09-16 PROBLEM — D22.61 MELANOCYTIC NEVI OF RIGHT UPPER LIMB, INCLUDING SHOULDER: Status: ACTIVE | Noted: 2024-09-16

## 2024-09-16 PROBLEM — D22.62 MELANOCYTIC NEVI OF LEFT UPPER LIMB, INCLUDING SHOULDER: Status: ACTIVE | Noted: 2024-09-16

## 2024-09-16 PROBLEM — D22.39 MELANOCYTIC NEVI OF OTHER PARTS OF FACE: Status: ACTIVE | Noted: 2024-09-16

## 2024-09-16 PROBLEM — D22.71 MELANOCYTIC NEVI OF RIGHT LOWER LIMB, INCLUDING HIP: Status: ACTIVE | Noted: 2024-09-16

## 2024-09-16 PROBLEM — D18.01 HEMANGIOMA OF SKIN AND SUBCUTANEOUS TISSUE: Status: ACTIVE | Noted: 2024-09-16

## 2024-09-16 PROBLEM — D48.5 NEOPLASM OF UNCERTAIN BEHAVIOR OF SKIN: Status: ACTIVE | Noted: 2024-09-16

## 2024-09-16 PROBLEM — D22.72 MELANOCYTIC NEVI OF LEFT LOWER LIMB, INCLUDING HIP: Status: ACTIVE | Noted: 2024-09-16

## 2024-09-16 PROCEDURE — OTHER MONITORING: OTHER

## 2024-09-16 PROCEDURE — OTHER COUNSELING: OTHER

## 2024-09-16 PROCEDURE — OTHER PRESCRIPTION: OTHER

## 2024-09-16 PROCEDURE — OTHER PHOTO-DOCUMENTATION: OTHER

## 2024-09-16 PROCEDURE — OTHER LIQUID NITROGEN: OTHER

## 2024-09-16 PROCEDURE — OTHER PRESCRIPTION MEDICATION MANAGEMENT: OTHER

## 2024-09-16 PROCEDURE — 99213 OFFICE O/P EST LOW 20 MIN: CPT | Mod: 25

## 2024-09-16 PROCEDURE — 17000 DESTRUCT PREMALG LESION: CPT

## 2024-09-16 PROCEDURE — OTHER MIPS QUALITY: OTHER

## 2024-09-16 RX ORDER — DOXYCYCLINE HYCLATE 100 MG/1
100MG TABLET, COATED ORAL BID
Qty: 60 | Refills: 2 | Status: CANCELLED

## 2024-09-16 RX ORDER — DOXYCYCLINE HYCLATE 100 MG/1
CAPSULE, GELATIN COATED ORAL
Qty: 180 | Refills: 0 | Status: ERX | COMMUNITY
Start: 2024-09-16

## 2024-09-16 ASSESSMENT — LOCATION ZONE DERM
LOCATION ZONE: TRUNK
LOCATION ZONE: FACE
LOCATION ZONE: SCALP
LOCATION ZONE: LEG
LOCATION ZONE: ARM

## 2024-09-16 ASSESSMENT — LOCATION SIMPLE DESCRIPTION DERM
LOCATION SIMPLE: LEFT CHEEK
LOCATION SIMPLE: RIGHT UPPER ARM
LOCATION SIMPLE: SCALP
LOCATION SIMPLE: ABDOMEN
LOCATION SIMPLE: RIGHT FOREARM
LOCATION SIMPLE: LEFT LOWER BACK
LOCATION SIMPLE: LEFT THIGH
LOCATION SIMPLE: LEFT UPPER ARM
LOCATION SIMPLE: LEFT FOREARM
LOCATION SIMPLE: RIGHT THIGH
LOCATION SIMPLE: RIGHT POSTERIOR THIGH
LOCATION SIMPLE: LEFT POSTERIOR THIGH
LOCATION SIMPLE: RIGHT CHEEK

## 2024-09-16 ASSESSMENT — LOCATION DETAILED DESCRIPTION DERM
LOCATION DETAILED: RIGHT SUPERIOR PARIETAL SCALP
LOCATION DETAILED: LEFT LATERAL ABDOMEN
LOCATION DETAILED: LEFT DISTAL POSTERIOR UPPER ARM
LOCATION DETAILED: LEFT INFERIOR CENTRAL MALAR CHEEK
LOCATION DETAILED: RIGHT ANTERIOR DISTAL THIGH
LOCATION DETAILED: LEFT INFERIOR MEDIAL MIDBACK
LOCATION DETAILED: RIGHT DISTAL POSTERIOR THIGH
LOCATION DETAILED: RIGHT DISTAL POSTERIOR UPPER ARM
LOCATION DETAILED: RIGHT VENTRAL DISTAL FOREARM
LOCATION DETAILED: LEFT ANTERIOR PROXIMAL THIGH
LOCATION DETAILED: LEFT DISTAL POSTERIOR THIGH
LOCATION DETAILED: RIGHT CENTRAL MALAR CHEEK
LOCATION DETAILED: LEFT VENTRAL PROXIMAL FOREARM

## 2024-09-16 NOTE — PROCEDURE: PRESCRIPTION MEDICATION MANAGEMENT
Render In Strict Bullet Format?: No
Initiate Treatment: Doxycycline 100mg BID as needed with flares
Detail Level: Zone

## 2024-09-16 NOTE — PROCEDURE: MONITORING
Detail Level: Detailed
Patient Specific Counseling (Will Not Stick From Patient To Patient): Discussed that we could monitor this lesion vs biopsy. Pt prefers to monitor the lesion and will RTC if it become symptomatic

## 2024-12-10 ENCOUNTER — RX ONLY (RX ONLY)
Age: 64
End: 2024-12-10

## 2024-12-10 RX ORDER — DOXYCYCLINE HYCLATE 100 MG/1
CAPSULE, GELATIN COATED ORAL
Qty: 180 | Refills: 0 | Status: ERX

## 2024-12-10 RX ORDER — DOXYCYCLINE HYCLATE 100 MG/1
CAPSULE, GELATIN COATED ORAL
Qty: 180 | Refills: 0 | Status: CANCELLED
Stop reason: CLARIF

## 2025-01-04 ENCOUNTER — HEALTH MAINTENANCE LETTER (OUTPATIENT)
Age: 65
End: 2025-01-04

## 2025-04-16 ENCOUNTER — APPOINTMENT (OUTPATIENT)
Dept: URBAN - METROPOLITAN AREA CLINIC 256 | Age: 65
Setting detail: DERMATOLOGY
End: 2025-04-16

## 2025-04-16 DIAGNOSIS — D22 MELANOCYTIC NEVI: ICD-10-CM

## 2025-04-16 DIAGNOSIS — L57.8 OTHER SKIN CHANGES DUE TO CHRONIC EXPOSURE TO NONIONIZING RADIATION: ICD-10-CM

## 2025-04-16 DIAGNOSIS — Z71.89 OTHER SPECIFIED COUNSELING: ICD-10-CM

## 2025-04-16 DIAGNOSIS — L82.1 OTHER SEBORRHEIC KERATOSIS: ICD-10-CM

## 2025-04-16 DIAGNOSIS — L57.0 ACTINIC KERATOSIS: ICD-10-CM

## 2025-04-16 DIAGNOSIS — D18.0 HEMANGIOMA: ICD-10-CM

## 2025-04-16 PROBLEM — D22.5 MELANOCYTIC NEVI OF TRUNK: Status: ACTIVE | Noted: 2025-04-16

## 2025-04-16 PROBLEM — D18.01 HEMANGIOMA OF SKIN AND SUBCUTANEOUS TISSUE: Status: ACTIVE | Noted: 2025-04-16

## 2025-04-16 PROBLEM — D22.62 MELANOCYTIC NEVI OF LEFT UPPER LIMB, INCLUDING SHOULDER: Status: ACTIVE | Noted: 2025-04-16

## 2025-04-16 PROBLEM — D22.39 MELANOCYTIC NEVI OF OTHER PARTS OF FACE: Status: ACTIVE | Noted: 2025-04-16

## 2025-04-16 PROBLEM — D22.61 MELANOCYTIC NEVI OF RIGHT UPPER LIMB, INCLUDING SHOULDER: Status: ACTIVE | Noted: 2025-04-16

## 2025-04-16 PROCEDURE — OTHER LIQUID NITROGEN: OTHER

## 2025-04-16 PROCEDURE — OTHER REASSURANCE: OTHER

## 2025-04-16 PROCEDURE — OTHER COUNSELING: OTHER

## 2025-04-16 PROCEDURE — OTHER MIPS QUALITY: OTHER

## 2025-04-16 PROCEDURE — 17000 DESTRUCT PREMALG LESION: CPT

## 2025-04-16 PROCEDURE — 99213 OFFICE O/P EST LOW 20 MIN: CPT | Mod: 25

## 2025-04-16 ASSESSMENT — LOCATION SIMPLE DESCRIPTION DERM
LOCATION SIMPLE: LEFT CHEEK
LOCATION SIMPLE: UPPER BACK
LOCATION SIMPLE: RIGHT FOREARM
LOCATION SIMPLE: LEFT FOREARM
LOCATION SIMPLE: LEFT UPPER BACK
LOCATION SIMPLE: LOWER BACK
LOCATION SIMPLE: LEFT LIP
LOCATION SIMPLE: LEFT UPPER ARM

## 2025-04-16 ASSESSMENT — LOCATION DETAILED DESCRIPTION DERM
LOCATION DETAILED: LEFT INFERIOR CENTRAL MALAR CHEEK
LOCATION DETAILED: LEFT VENTRAL PROXIMAL FOREARM
LOCATION DETAILED: LEFT INFERIOR VERMILION LIP
LOCATION DETAILED: LEFT CENTRAL MALAR CHEEK
LOCATION DETAILED: SUPERIOR LUMBAR SPINE
LOCATION DETAILED: LEFT ANTECUBITAL SKIN
LOCATION DETAILED: LEFT INFERIOR LATERAL MALAR CHEEK
LOCATION DETAILED: SUPERIOR THORACIC SPINE
LOCATION DETAILED: RIGHT VENTRAL PROXIMAL FOREARM
LOCATION DETAILED: LEFT SUPERIOR MEDIAL UPPER BACK

## 2025-04-16 ASSESSMENT — LOCATION ZONE DERM
LOCATION ZONE: TRUNK
LOCATION ZONE: ARM
LOCATION ZONE: LIP
LOCATION ZONE: FACE

## 2025-04-16 NOTE — HPI: SKIN LESION
Is This A New Presentation, Or A Follow-Up?: Mole
Additional History: Wife noticed Recently changed been present for a long time \\n\\nSpot on lip dry and flaky

## 2025-04-22 ENCOUNTER — RX ONLY (RX ONLY)
Age: 65
End: 2025-04-22

## 2025-04-22 RX ORDER — DOXYCYCLINE HYCLATE 100 MG/1
CAPSULE, GELATIN COATED ORAL
Qty: 180 | Refills: 0 | Status: ERX

## (undated) DEVICE — TOTE ANGIO CORP PC15AT SAN32CC83O

## (undated) DEVICE — CATH BALLOON NC EMERGE 3.50X12MM H7493926712350

## (undated) DEVICE — WIRE GUIDE 0.035"X260CM AMPTLAZ XSTIFF CVD THSCF-35-260-3-A

## (undated) DEVICE — CATH ANGIO INFINITI JR4 4FRX100CM 538421

## (undated) DEVICE — WIRE GUIDE HI-TRQ  WHISPER MS JTIP 0.014"X190CM 1005357HJ

## (undated) DEVICE — INTRO SHEATH 6FRX10CM PINNACLE RSS602

## (undated) DEVICE — CATH BALLOON NC EMERGE 3.50X15MM H7493926715350

## (undated) DEVICE — GW VASC 190CM .014IN HI-TRQ 1009660J

## (undated) DEVICE — CATH GUIDING SASUKE 145CM TAPER TIP SS 0.014IN GW

## (undated) DEVICE — VALVE HEMOSTASIS .096" COPILOT MECH 1003331

## (undated) DEVICE — ENDO SNARE POLYPECTOMY OVAL 15MM LOOP SD-240U-15

## (undated) DEVICE — CATH BALLOON NC EMERGE 3.00X8MM H7493926708300

## (undated) DEVICE — INTRO SHEATH 4FRX10CM PINNACLE RSS402

## (undated) DEVICE — SYR ANGIOGRAPHY MULTIUSE KIT ACIST 014612

## (undated) DEVICE — CATH BALLOON NC EMERGE 4.50X12MM H7493926712450

## (undated) DEVICE — CATH DIAG 4FR JL 4.5 538417

## (undated) DEVICE — MANIFOLD KIT ANGIO AUTOMATED 014613

## (undated) DEVICE — CATH IVUS OPTICROSS HD 6 3.6FR 1.18MM DIA 135CML H7493935408

## (undated) DEVICE — CATH RX TAKERU PTCA BALLOON 2.00MM X 12MM

## (undated) DEVICE — RAD CLOSURE ANGIOSEAL 8FR  610131

## (undated) DEVICE — GUIDEWIRE ASAHI FIELDER XT 190

## (undated) DEVICE — CATH BALLOON NC EMERGE 5.00X12MM H7493926712500

## (undated) DEVICE — CATH BALLOON EMERGE 3.5X12MM H7493918912350

## (undated) DEVICE — Device

## (undated) DEVICE — CLOSURE ANGIOSEAL 6FR 610130

## (undated) DEVICE — CATH BALLOON NC EMERGE 5.00X20MM H7493926720500

## (undated) DEVICE — CATH ANGIO INFINITI 3DRC 6FRX100CM 534676T

## (undated) DEVICE — RAD INTRODUCER KIT MICRO 5FRX10CM .018 NITINOL G/W

## (undated) DEVICE — KIT HAND CONTROL ANGIOTOUCH ACIST 65CM AT-P65

## (undated) DEVICE — WIRE GUIDE HI-TRQ ALL STAR 3CM JTIP 0.014"X190CM 1001740J

## (undated) DEVICE — CATH BALLOON NC EMERGE 2.50X8MM H7493926708250

## (undated) DEVICE — GUIDEWIRE VASC 0.014INX180CM RUNTHROUGH 25-1011

## (undated) DEVICE — SMART CAPNOLINE H PLUS, ADULT/INTERMEDIATE O2, LONG

## (undated) DEVICE — 8F TRAPLINER CATHETER GUIDE EXTENSION PLUS WIRE TRAPPING (FORMERLY VASCULAR SOLUTIONS)

## (undated) DEVICE — NDL PERC ENTRY THINWALL 18GA 7.0" G00166

## (undated) DEVICE — DEFIB PRO-PADZ LVP LQD GEL ADULT 8900-2105-01

## (undated) DEVICE — DEVICE RETRIEVAL ROTH NET 3.89MMX160CM 00711155

## (undated) DEVICE — INFL DVC KIT W/10CC NITRO IN4530

## (undated) DEVICE — CATH GUIDE EXT TRAPLINER 6F

## (undated) DEVICE — CATH BALLOON EMERGE 2.5X12MM H7493918912250

## (undated) DEVICE — CATH ANGIO INFINITI 3DRC 4FRX100CM 538476

## (undated) DEVICE — CATH RX TAKERU OTW PTCA BALLOON 1.5X15MM DC-RY1515UA1

## (undated) DEVICE — DILATOR VASC W/INTRO GW 8FRX19CM .038"

## (undated) DEVICE — GUIDEWIRE VASC MICROGLIDE 0.014INX190CM 22299M-W2

## (undated) DEVICE — ESU GROUND PAD ADULT W/CORD E7507

## (undated) DEVICE — GW MINAMO STRAIGHT 190CM

## (undated) DEVICE — CATH LAUNCHER 6FR LA6EBU375

## (undated) DEVICE — GUIDEWIRE VASC GLADIUS MONGO 0.014IN 190CM 3CM TIP 3GM STR

## (undated) DEVICE — INTRODUCER CATH VASC 5FRX10CM  MPIS-501-NT-U-SST

## (undated) DEVICE — CATH TURNPIKE LP 150CM

## (undated) DEVICE — CATH ANGIO JUDKINS JL4 6FRX100CM INFINITI 534620T

## (undated) DEVICE — INTRO SHEATH 8FRX65CM PINNACLE DESTINATION STR 54-86501

## (undated) DEVICE — ENDO TRAP POLYP QUICK CATCH 710201

## (undated) DEVICE — CATH BALLOON NC EMERGE 3.00X15MM H7493926715300

## (undated) DEVICE — CATH BALLOON EMERGE 3.0X12MM H7493918912300

## (undated) DEVICE — CATH SUPERCROSS RX 120DEG 130CM OTW

## (undated) DEVICE — INTRODUCER SHEATH GREEN 6.5FRX11CM .038IN PSI-6F-11-038ACT

## (undated) DEVICE — CATH BALLOON NC EUPHORA 4.00X15MM NCEUP4015X

## (undated) DEVICE — KIT ENDO TURNOVER/PROCEDURE W/CLEAN A SCOPE LINERS 103888

## (undated) DEVICE — CATH RX TAKERU PTCA BALLOON 1.5X12MM DC-RY1512UA1

## (undated) RX ORDER — NITROGLYCERIN 5 MG/ML
VIAL (ML) INTRAVENOUS
Status: DISPENSED
Start: 2021-07-30

## (undated) RX ORDER — ASPIRIN 325 MG
TABLET ORAL
Status: DISPENSED
Start: 2021-07-30

## (undated) RX ORDER — FENTANYL CITRATE 50 UG/ML
INJECTION, SOLUTION INTRAMUSCULAR; INTRAVENOUS
Status: DISPENSED
Start: 2021-07-28

## (undated) RX ORDER — FENTANYL CITRATE 50 UG/ML
INJECTION, SOLUTION INTRAMUSCULAR; INTRAVENOUS
Status: DISPENSED
Start: 2023-05-17

## (undated) RX ORDER — HEPARIN SODIUM 1000 [USP'U]/ML
INJECTION, SOLUTION INTRAVENOUS; SUBCUTANEOUS
Status: DISPENSED
Start: 2023-05-17

## (undated) RX ORDER — HEPARIN SODIUM 1000 [USP'U]/ML
INJECTION, SOLUTION INTRAVENOUS; SUBCUTANEOUS
Status: DISPENSED
Start: 2021-07-28

## (undated) RX ORDER — HEPARIN SODIUM 200 [USP'U]/100ML
INJECTION, SOLUTION INTRAVENOUS
Status: DISPENSED
Start: 2021-07-28

## (undated) RX ORDER — POTASSIUM CHLORIDE 1500 MG/1
TABLET, EXTENDED RELEASE ORAL
Status: DISPENSED
Start: 2023-05-17

## (undated) RX ORDER — FENTANYL CITRATE 50 UG/ML
INJECTION, SOLUTION INTRAMUSCULAR; INTRAVENOUS
Status: DISPENSED
Start: 2018-05-29

## (undated) RX ORDER — PRASUGREL 10 MG/1
TABLET, FILM COATED ORAL
Status: DISPENSED
Start: 2021-07-30

## (undated) RX ORDER — LIDOCAINE HYDROCHLORIDE 10 MG/ML
INJECTION, SOLUTION EPIDURAL; INFILTRATION; INTRACAUDAL; PERINEURAL
Status: DISPENSED
Start: 2023-05-17

## (undated) RX ORDER — LIDOCAINE HYDROCHLORIDE 10 MG/ML
INJECTION, SOLUTION EPIDURAL; INFILTRATION; INTRACAUDAL; PERINEURAL
Status: DISPENSED
Start: 2021-07-30

## (undated) RX ORDER — FENTANYL CITRATE 50 UG/ML
INJECTION, SOLUTION INTRAMUSCULAR; INTRAVENOUS
Status: DISPENSED
Start: 2021-07-30

## (undated) RX ORDER — ATROPINE SULFATE 0.1 MG/ML
INJECTION INTRAVENOUS
Status: DISPENSED
Start: 2021-07-30

## (undated) RX ORDER — FENTANYL CITRATE 50 UG/ML
INJECTION, SOLUTION INTRAMUSCULAR; INTRAVENOUS
Status: DISPENSED
Start: 2017-05-22

## (undated) RX ORDER — ONDANSETRON 2 MG/ML
INJECTION INTRAMUSCULAR; INTRAVENOUS
Status: DISPENSED
Start: 2021-07-30

## (undated) RX ORDER — HEPARIN SODIUM 200 [USP'U]/100ML
INJECTION, SOLUTION INTRAVENOUS
Status: DISPENSED
Start: 2023-05-17

## (undated) RX ORDER — HEPARIN SODIUM 200 [USP'U]/100ML
INJECTION, SOLUTION INTRAVENOUS
Status: DISPENSED
Start: 2021-07-30

## (undated) RX ORDER — HEPARIN SODIUM 1000 [USP'U]/ML
INJECTION, SOLUTION INTRAVENOUS; SUBCUTANEOUS
Status: DISPENSED
Start: 2021-07-30

## (undated) RX ORDER — LIDOCAINE HYDROCHLORIDE 10 MG/ML
INJECTION, SOLUTION EPIDURAL; INFILTRATION; INTRACAUDAL; PERINEURAL
Status: DISPENSED
Start: 2021-07-28